# Patient Record
Sex: FEMALE | Race: WHITE | Employment: OTHER | URBAN - METROPOLITAN AREA
[De-identification: names, ages, dates, MRNs, and addresses within clinical notes are randomized per-mention and may not be internally consistent; named-entity substitution may affect disease eponyms.]

---

## 2019-01-29 ENCOUNTER — OFFICE VISIT (OUTPATIENT)
Dept: ENDOCRINOLOGY | Age: 74
End: 2019-01-29
Payer: COMMERCIAL

## 2019-01-29 VITALS
HEIGHT: 65 IN | DIASTOLIC BLOOD PRESSURE: 98 MMHG | WEIGHT: 220 LBS | RESPIRATION RATE: 20 BRPM | HEART RATE: 96 BPM | SYSTOLIC BLOOD PRESSURE: 184 MMHG | TEMPERATURE: 98.4 F | OXYGEN SATURATION: 98 % | BODY MASS INDEX: 36.65 KG/M2

## 2019-01-29 DIAGNOSIS — Z79.4 TYPE 2 DIABETES MELLITUS WITHOUT COMPLICATION, WITH LONG-TERM CURRENT USE OF INSULIN (HCC): ICD-10-CM

## 2019-01-29 DIAGNOSIS — E11.9 TYPE 2 DIABETES MELLITUS WITHOUT COMPLICATION, WITH LONG-TERM CURRENT USE OF INSULIN (HCC): ICD-10-CM

## 2019-01-29 LAB — HBA1C MFR BLD: 10.1 %

## 2019-01-29 PROCEDURE — 83036 HEMOGLOBIN GLYCOSYLATED A1C: CPT | Performed by: INTERNAL MEDICINE

## 2019-01-29 PROCEDURE — 99205 OFFICE O/P NEW HI 60 MIN: CPT | Performed by: INTERNAL MEDICINE

## 2019-01-29 RX ORDER — METOPROLOL SUCCINATE 50 MG/1
50 TABLET, EXTENDED RELEASE ORAL DAILY
COMMUNITY
End: 2019-05-09 | Stop reason: SDUPTHER

## 2019-01-29 RX ORDER — LISINOPRIL AND HYDROCHLOROTHIAZIDE 20; 12.5 MG/1; MG/1
1 TABLET ORAL 2 TIMES DAILY
COMMUNITY
End: 2019-05-09 | Stop reason: SDUPTHER

## 2019-01-29 RX ORDER — ATORVASTATIN CALCIUM 40 MG/1
40 TABLET, FILM COATED ORAL DAILY
COMMUNITY
End: 2019-05-09 | Stop reason: SDUPTHER

## 2019-02-04 LAB
AVERAGE GLUCOSE: NORMAL
HBA1C MFR BLD: 10.5 %

## 2019-03-12 ENCOUNTER — TELEPHONE (OUTPATIENT)
Dept: BARIATRICS/WEIGHT MGMT | Age: 74
End: 2019-03-12

## 2019-03-22 ENCOUNTER — TELEPHONE (OUTPATIENT)
Dept: ENDOCRINOLOGY | Age: 74
End: 2019-03-22

## 2019-04-11 ENCOUNTER — OFFICE VISIT (OUTPATIENT)
Dept: ENDOCRINOLOGY | Age: 74
End: 2019-04-11
Payer: COMMERCIAL

## 2019-04-11 VITALS
BODY MASS INDEX: 36.15 KG/M2 | SYSTOLIC BLOOD PRESSURE: 140 MMHG | RESPIRATION RATE: 16 BRPM | DIASTOLIC BLOOD PRESSURE: 76 MMHG | HEIGHT: 65 IN | WEIGHT: 217 LBS | HEART RATE: 72 BPM

## 2019-04-11 DIAGNOSIS — Z79.4 TYPE 2 DIABETES MELLITUS WITHOUT COMPLICATION, WITH LONG-TERM CURRENT USE OF INSULIN (HCC): Primary | ICD-10-CM

## 2019-04-11 DIAGNOSIS — E11.9 TYPE 2 DIABETES MELLITUS WITHOUT COMPLICATION, WITH LONG-TERM CURRENT USE OF INSULIN (HCC): Primary | ICD-10-CM

## 2019-04-11 PROCEDURE — 99214 OFFICE O/P EST MOD 30 MIN: CPT | Performed by: INTERNAL MEDICINE

## 2019-04-11 RX ORDER — NAPROXEN SODIUM 220 MG
TABLET ORAL
Qty: 300 EACH | Refills: 2 | Status: SHIPPED | OUTPATIENT
Start: 2019-04-11 | End: 2020-04-30 | Stop reason: SDUPTHER

## 2019-04-11 NOTE — PROGRESS NOTES
CC -   T2DM, Hyperglycemia    HPI -       Pt returns for follow up of last visit with me on 01/29/2019        Instructions from last visit were:    1. T2DM - uncontrolled       A1c 10.1%, without lows       Pt's limiting factor presently is her diet (it includes a lot of carbs at the present time)       Also, she is not taking her insulin 70/30 correctly and has no BG data for me to review. Therefore the plan will be: Take Insulin 70/30 30 units before breakfast and 15 units before dinner       Stop Tradjenta (it is probably not helping at this point)       Cont metformin 500 mg bid       Check BG twice daily: before breakfast every day + before dinner on even days and before bed on odd days       Also, during the next two weeks, check BG before each meal, two hours after each meal and before bedtime. Record these on the log given and send to our clinic.     2. Hypertension - uncontrolled       Pt states that her BP is usually elevated, even at home (though not as high as it is today)       Check BP at home frequently. If systolic BP is consistently above 140 mmHg or the diastolic BP above 90 mmHg, then contact Dr. Avni Okeefe for a dose adjustment     3. Follow up       See me back in two months    Clinical documentation from 3/22/19  Spoke with pt by phone regarding BG readings Mar 4-19. They are near target. AM are typically in the 160-180 range with 1/4th of the number in the low 200's. There is a good fall over night from HS to Fasting. Only two lows (58,62) and these occurred after walking. The one day of 2 hour post meal readings are great - the rise is 20-70.   Will cont with the same regimen and have her eat a 15 gram carb snack with protein before walking. (a couple of Keebler toasted PB crackers or 3-5 grapes with a Mozarella cheese stick)          Update from today's encounter:  Doing well  A1c 10.5% 2/4/19  Checking BG 2-4x/day: Fasting 140-180 with 1/4th 190-215, pre-dinner low 200's with lows   Target a1c 7.5% without lows   Her BG log does not correlate with her a1c   Will obtain a CGM 3-5d study to check glycemic control   Cont present regimen: insulin 70/30 30units before breakfast and 15 units before dinner; use Novolog as needed for BG readings >200mg/dl; metformin 1000mg daily    2.   Hyperglycemia - uncontrolled   Need to determine timing of high BG readings   Complete the 3-5d CGM study    Pablo Brown MD  Endocrinology/Obesity  4/11/19

## 2019-04-18 DIAGNOSIS — Z79.4 TYPE 2 DIABETES MELLITUS WITHOUT COMPLICATION, WITH LONG-TERM CURRENT USE OF INSULIN (HCC): Primary | ICD-10-CM

## 2019-04-18 DIAGNOSIS — E11.9 TYPE 2 DIABETES MELLITUS WITHOUT COMPLICATION, WITH LONG-TERM CURRENT USE OF INSULIN (HCC): Primary | ICD-10-CM

## 2019-05-09 ENCOUNTER — OFFICE VISIT (OUTPATIENT)
Dept: PRIMARY CARE CLINIC | Age: 74
End: 2019-05-09
Payer: COMMERCIAL

## 2019-05-09 VITALS
DIASTOLIC BLOOD PRESSURE: 78 MMHG | BODY MASS INDEX: 35.82 KG/M2 | HEIGHT: 65 IN | SYSTOLIC BLOOD PRESSURE: 136 MMHG | RESPIRATION RATE: 18 BRPM | OXYGEN SATURATION: 97 % | TEMPERATURE: 99.3 F | WEIGHT: 215 LBS | HEART RATE: 88 BPM

## 2019-05-09 DIAGNOSIS — R53.83 OTHER FATIGUE: ICD-10-CM

## 2019-05-09 DIAGNOSIS — E11.22 TYPE 2 DIABETES MELLITUS WITH STAGE 2 CHRONIC KIDNEY DISEASE, WITH LONG-TERM CURRENT USE OF INSULIN (HCC): Primary | ICD-10-CM

## 2019-05-09 DIAGNOSIS — H35.3130 BILATERAL NONEXUDATIVE AGE-RELATED MACULAR DEGENERATION, UNSPECIFIED STAGE: ICD-10-CM

## 2019-05-09 DIAGNOSIS — N18.2 TYPE 2 DIABETES MELLITUS WITH STAGE 2 CHRONIC KIDNEY DISEASE, WITH LONG-TERM CURRENT USE OF INSULIN (HCC): Primary | ICD-10-CM

## 2019-05-09 DIAGNOSIS — E55.9 VITAMIN D DEFICIENCY: ICD-10-CM

## 2019-05-09 DIAGNOSIS — E78.2 MIXED HYPERLIPIDEMIA: ICD-10-CM

## 2019-05-09 DIAGNOSIS — N85.02 ENDOMETRIAL INTRAEPITHELIAL NEOPLASIA (EIN): ICD-10-CM

## 2019-05-09 DIAGNOSIS — N18.2 STAGE 2 CHRONIC KIDNEY DISEASE: ICD-10-CM

## 2019-05-09 DIAGNOSIS — R60.0 LOCALIZED EDEMA: ICD-10-CM

## 2019-05-09 DIAGNOSIS — I10 BENIGN ESSENTIAL HYPERTENSION: ICD-10-CM

## 2019-05-09 DIAGNOSIS — Z79.4 TYPE 2 DIABETES MELLITUS WITH STAGE 2 CHRONIC KIDNEY DISEASE, WITH LONG-TERM CURRENT USE OF INSULIN (HCC): Primary | ICD-10-CM

## 2019-05-09 PROCEDURE — 99214 OFFICE O/P EST MOD 30 MIN: CPT | Performed by: INTERNAL MEDICINE

## 2019-05-09 RX ORDER — METOPROLOL SUCCINATE 50 MG/1
50 TABLET, EXTENDED RELEASE ORAL DAILY
Qty: 90 TABLET | Refills: 1 | Status: SHIPPED | OUTPATIENT
Start: 2019-05-09 | End: 2019-11-07 | Stop reason: SDUPTHER

## 2019-05-09 RX ORDER — LISINOPRIL AND HYDROCHLOROTHIAZIDE 20; 12.5 MG/1; MG/1
1 TABLET ORAL 2 TIMES DAILY
Qty: 180 TABLET | Refills: 1 | Status: SHIPPED | OUTPATIENT
Start: 2019-05-09 | End: 2019-11-07 | Stop reason: SDUPTHER

## 2019-05-09 RX ORDER — ATORVASTATIN CALCIUM 40 MG/1
40 TABLET, FILM COATED ORAL DAILY
Qty: 90 TABLET | Refills: 1 | Status: SHIPPED | OUTPATIENT
Start: 2019-05-09 | End: 2019-11-07 | Stop reason: SDUPTHER

## 2019-05-09 ASSESSMENT — ENCOUNTER SYMPTOMS
COUGH: 0
VOMITING: 0
SHORTNESS OF BREATH: 0
EYE PAIN: 0
CHEST TIGHTNESS: 0
CONSTIPATION: 0
BLOOD IN STOOL: 0
NAUSEA: 0
ABDOMINAL PAIN: 0
SORE THROAT: 0
RHINORRHEA: 0
DIARRHEA: 1

## 2019-05-09 ASSESSMENT — PATIENT HEALTH QUESTIONNAIRE - PHQ9
1. LITTLE INTEREST OR PLEASURE IN DOING THINGS: 0
2. FEELING DOWN, DEPRESSED OR HOPELESS: 0
SUM OF ALL RESPONSES TO PHQ QUESTIONS 1-9: 0
SUM OF ALL RESPONSES TO PHQ QUESTIONS 1-9: 0
SUM OF ALL RESPONSES TO PHQ9 QUESTIONS 1 & 2: 0

## 2019-05-09 NOTE — PROGRESS NOTES
[facility]) Physicians   Internal Medicine     2019  Brina Marques : 1945 Sex: female  Age:76 y.o. Chief Complaint   Patient presents with    Hypertension     3 mth follow-up    Hyperlipidemia    Diabetes    Health Maintenance     due for shingles,tdap,dexa scan,colonoscopy,hep c,mammogram        HPI:   Patient presents to office for review and management of chronic medical conditions.    - [facility] has been diagnosed with macular degeneration. [facility] has seen retinal specialist and receiving  injections. Possible Unsure if Retinopathy.    - [facility] has DM. Fllowing with endocrinology for Diabetes. [facility] monitors sugars at home. States on 70/30 insulin 30 units in am and 15 units in pm and metformin. Stopped Tradjenta. Adjusted Humalog Slide scale if over 200. [facility] blood sugars in am is 110's -140 Occasionally over 200 before supper. [facility] has had episodes of hypoglycemia since last visit. Not always compliant with diet. - [facility] has hyperlipidemia. Now on atorvastatin for high intensity statin. Trying to watch diet. No reported side effects. Frequently forgets to take at bedtime.    - [facility] has high blood pressure. [facility] does randomly check blood pressure at home. States 130/80's. On lisinopril and HCTZ and metoprolol. [facility] has some fatigue.    - [facility] right shoulder pain has improved. Health Maintenance   - immunizations:   Influenza Vaccination - (10/20/2016) , (), (2018)  Pneumonia Vaccination  Zoster/Shingles Vaccine  Tetanus Vaccination    - Screenings:   Bone Density Scan - () - normal  Pelvic/Pap Exam - (), () - gyn  Mammogram - () - gyn    Colonoscopy - declines    Couseled on Home Safety  - (2017)    Dilated Eye Exam - (2017) - Wet AMD, diabetes, cataract, (2018) - no Retinopathy, wet AMD,  (2018)      ROS:  Review of Systems   Constitutional: Negative for appetite change, chills, fever and unexpected weight change.    HENT: Negative for congestion, rhinorrhea and sore throat. Eyes: Negative for pain and visual disturbance. Respiratory: Negative for cough, chest tightness and shortness of breath. Cardiovascular: Negative for chest pain and palpitations. Gastrointestinal: Positive for diarrhea. Negative for abdominal pain, blood in stool, constipation, nausea and vomiting. Genitourinary: Negative for difficulty urinating, dysuria, frequency, pelvic pain, urgency and vaginal bleeding. Musculoskeletal: Negative for arthralgias and myalgias. Skin: Negative for rash. Neurological: Negative for dizziness, syncope, weakness, light-headedness, numbness and headaches. Hematological: Negative for adenopathy. Psychiatric/Behavioral: Negative for suicidal ideas. The patient is not nervous/anxious.           Current Outpatient Medications:     metoprolol succinate (TOPROL XL) 50 MG extended release tablet, Take 1 tablet by mouth daily, Disp: 90 tablet, Rfl: 1    lisinopril-hydrochlorothiazide (PRINZIDE;ZESTORETIC) 20-12.5 MG per tablet, Take 1 tablet by mouth 2 times daily, Disp: 180 tablet, Rfl: 1    atorvastatin (LIPITOR) 40 MG tablet, Take 1 tablet by mouth daily, Disp: 90 tablet, Rfl: 1    insulin 70-30 (NOVOLIN 70/30) (70-30) 100 UNIT per ML injection vial, 30 units before breakfast and 15 units before dinner, Disp: 2 vial, Rfl: 3    metFORMIN (GLUCOPHAGE) 500 MG tablet, Take 2 tablets by mouth daily (with breakfast), Disp: 180 tablet, Rfl: 2    blood glucose test strips (ASCENSIA AUTODISC VI;ONE TOUCH ULTRA TEST VI) strip, Test BG 2-6 times daily, Disp: 400 each, Rfl: 2    Insulin Syringe-Needle U-100 (INSULIN SYRINGE .5CC/31GX5/16\") 31G X 5/16\" 0.5 ML MISC, Use to take insulin 2-3x daily, Disp: 300 each, Rfl: 2    Multiple Vitamins-Minerals (PRESERVISION AREDS 2 PO), Take by mouth 2 times daily, Disp: , Rfl:     vitamin D (CHOLECALCIFEROL) 1000 UNIT TABS tablet, Take 1,000 Units by mouth daily, Disp: , Rfl:     Allergies Allergen Reactions    Penicillins Hives and Other (See Comments)     EDEMA EXTREMITIES       Past Medical History:   Diagnosis Date    Bilateral nonexudative age-related macular degeneration 2019    HLD (hyperlipidemia)     HTN (hypertension)     Stage 2 chronic kidney disease 2019    T2DM (type 2 diabetes mellitus) ([facility] [address]     Vitamin D deficiency 2019       Past Surgical History:   Procedure Laterality Date    HYSTERECTOMY, TOTAL ABDOMINAL         Family History   Problem Relation Age of Onset    Heart Disease Mother         late onset,  in 80's    Heart Disease Father         late onset,  in 80's   [facility] No Known Problems Son     No Known Problems Son     No Known Problems Son        Social History     Socioeconomic History    Marital status:      Spouse name: Not on file    Number of children: 3    Years of education: Not on file    Highest education level: Not on file   Occupational History    Occupation:    Social Needs    Financial resource strain: Not on file    Food insecurity:     Worry: Not on file     Inability: Not on file    Transportation needs:     Medical: Not on file     Non-medical: Not on file   Tobacco Use    Smoking status: Never Smoker    Smokeless tobacco: Never Used   Substance and Sexual Activity    Alcohol use: No    Drug use: No    Sexual activity: Never   Lifestyle    Physical activity:     Days per week: Not on file     Minutes per session: Not on file    Stress: Not on file   Relationships    Social connections:     Talks on phone: Not on file     Gets together: Not on file     Attends [employer] service: Not on file     Active member of club or organization: Not on file     Attends meetings of clubs or organizations: Not on file     Relationship status: Not on file    Intimate partner violence:     Fear of current or ex partner: Not on file     Emotionally abused: Not on file     Physically abused: Not on file that so that any sugar > 200   - on metformin   - monitor for hypoglycemia and action plan  - Last A1c was 10.5 (2/2019)  - follows with optho  - on statin  - on asa  - non compliant with diet    Stopped tradjenta     Benign essential hypertension  - continue present treatment  - monitor blood pressure at home  - watch diet   - on metoprolol  - on lisinopril-HCTZ  - stable today     Mixed hyperlipidemia  continue present treatment  - watch diet  - stop pravastatin and switch to atorvastatin 40mg - high intensity statin  - improved    Localized edema  - Stable    Vitamin D deficiency  - Continue present treatment   - -on otc supplement  - follow labs     Endometrial intraepithelial neoplasia (EIN)  -s/p surgery  - follows with gyn    Stage 2 chronic kidney disease  - Noted on labs   - possibly multifactorial (HTN, DM)  - on ACE   - may need to consider nephro referral   - monitor labs        Return in about 3 months (around 8/9/2019) for check up and review and labs.       Orders Placed This Encounter   Procedures    Comprehensive Metabolic Panel     Standing Status:   Future     Standing Expiration Date:   5/9/2020    CBC with Differential     Standing Status:   Future     Standing Expiration Date:   5/9/2020    Magnesium     Standing Status:   Future     Standing Expiration Date:   5/9/2020    Lipid, Fasting     Standing Status:   Future     Standing Expiration Date:   5/9/2020    Hemoglobin A1C     Standing Status:   Future     Standing Expiration Date:   5/8/2020    Vitamin D 25 Hydroxy     Standing Status:   Future     Standing Expiration Date:   5/9/2020    TSH without Reflex     Standing Status:   Future     Standing Expiration Date:   8/9/2019    Urinalysis     Standing Status:   Future     Standing Expiration Date:   5/9/2020   [facility]     Standing Status:   Future     Standing Expiration Date:   5/9/2020       Demi Gonzalez MD  5/9/2019  1:09 PM

## 2019-07-11 DIAGNOSIS — Z79.4 TYPE 2 DIABETES MELLITUS WITHOUT COMPLICATION, WITH LONG-TERM CURRENT USE OF INSULIN (HCC): Primary | ICD-10-CM

## 2019-07-11 DIAGNOSIS — E11.9 TYPE 2 DIABETES MELLITUS WITHOUT COMPLICATION, WITH LONG-TERM CURRENT USE OF INSULIN (HCC): Primary | ICD-10-CM

## 2019-07-16 ENCOUNTER — OFFICE VISIT (OUTPATIENT)
Dept: ENDOCRINOLOGY | Age: 74
End: 2019-07-16
Payer: COMMERCIAL

## 2019-07-16 VITALS
OXYGEN SATURATION: 98 % | WEIGHT: 214.2 LBS | HEART RATE: 78 BPM | SYSTOLIC BLOOD PRESSURE: 150 MMHG | RESPIRATION RATE: 16 BRPM | BODY MASS INDEX: 35.69 KG/M2 | HEIGHT: 65 IN | DIASTOLIC BLOOD PRESSURE: 90 MMHG

## 2019-07-16 DIAGNOSIS — E11.9 TYPE 2 DIABETES MELLITUS WITHOUT COMPLICATION, WITH LONG-TERM CURRENT USE OF INSULIN (HCC): Primary | ICD-10-CM

## 2019-07-16 DIAGNOSIS — R73.9 HYPERGLYCEMIA: ICD-10-CM

## 2019-07-16 DIAGNOSIS — Z79.4 TYPE 2 DIABETES MELLITUS WITHOUT COMPLICATION, WITH LONG-TERM CURRENT USE OF INSULIN (HCC): Primary | ICD-10-CM

## 2019-07-16 LAB — HBA1C MFR BLD: 10.4 %

## 2019-07-16 PROCEDURE — 99214 OFFICE O/P EST MOD 30 MIN: CPT | Performed by: INTERNAL MEDICINE

## 2019-07-16 PROCEDURE — 83036 HEMOGLOBIN GLYCOSYLATED A1C: CPT | Performed by: INTERNAL MEDICINE

## 2019-07-16 NOTE — PROGRESS NOTES
CC -   T2DM, Hyperglycemia    HPI -   Pt returns for follow up of last visit with me on 4/11/2019    Problem 1: T2DM  a1c 10.4% 7/16/19, was 10.5% 2/14/19  Checking Fasting and pre-dinner (mid-100's to mid-200's)  Present regimen: insulin 70/30 30units before breakfast and 15 units before dinner; uses 5units of Novolog as needed for BG readings >250mg/dl; metformin 1000mg daily    Eyes: 6/6/2019, no DR  Kidneys:  Cr 1.09 2/4/19  ACR:  Had performed 10/9/18 at [address] [place] [address]. The results were not printed correctly. Will need to have staff check  Statin:  Atorvastatin 40mg daily  Ace-I:   Lisinopril 20 mg daily  BP:      +HTN, 150/90, Typically controlled on Lisinopril, HCTZ, Metoprolol  Feet:    1/29/19, Good monofilament sensation b/l, no ulcers or calluses, +onychomycosis b/l, good perfusion of both feet    Problem 2. Hyperglycemia  Not following a carb conscious diet and this is her chief concern. It places her at risk of hypoglycemia at times when her BG is normal and she eats an appropriate amount of carbs    PFSH -  No changes, Meds reviewed, non-smoker    ROS -  Card - no CP  GI - no N/V/D    PE -  Gen : BP (!) 150/90 (Site: Left Upper Arm, Position: Sitting, Cuff Size: Medium Adult)   Pulse 78   Resp 16   Ht 5' 5\" (1.651 m)   Wt 214 lb 3.2 oz (97.2 kg)   SpO2 98%   BMI 35.64 kg/m²    WN, WD, NAD  Lung: Nml resp effort  Psych: Normal mood   Full affect    A/P -   1. T2DM - uncontrolled  See HPI for description  I still do not have the report of her U ACR  Pt's chief issue is her diet. She understands this and will cont to try to make changes  Plan:  Cont with present regimen: insulin 70/30 30units before breakfast and 15 units before dinner; Take 5 units of Novolog (per her previous plan prior to seeing me) as needed for BG readings >250mg/dl; metformin 1000mg daily  Restrict carbs to <120 grams per day (<40 grams per meal)  Check urine for microalbumin    2.   Hyperglycemia - uncontrolled  See HPI for

## 2019-08-09 ENCOUNTER — HOSPITAL ENCOUNTER (OUTPATIENT)
Age: 74
Discharge: HOME | End: 2019-08-11
Payer: COMMERCIAL

## 2019-08-09 DIAGNOSIS — N18.2 TYPE 2 DIABETES MELLITUS WITH STAGE 2 CHRONIC KIDNEY DISEASE, WITH LONG-TERM CURRENT USE OF INSULIN (HCC): ICD-10-CM

## 2019-08-09 DIAGNOSIS — E11.22 TYPE 2 DIABETES MELLITUS WITH STAGE 2 CHRONIC KIDNEY DISEASE, WITH LONG-TERM CURRENT USE OF INSULIN (HCC): ICD-10-CM

## 2019-08-09 DIAGNOSIS — E55.9 VITAMIN D DEFICIENCY: ICD-10-CM

## 2019-08-09 DIAGNOSIS — Z79.4 TYPE 2 DIABETES MELLITUS WITH STAGE 2 CHRONIC KIDNEY DISEASE, WITH LONG-TERM CURRENT USE OF INSULIN (HCC): ICD-10-CM

## 2019-08-09 DIAGNOSIS — R53.83 OTHER FATIGUE: ICD-10-CM

## 2019-08-09 DIAGNOSIS — I10 BENIGN ESSENTIAL HYPERTENSION: ICD-10-CM

## 2019-08-09 DIAGNOSIS — E78.2 MIXED HYPERLIPIDEMIA: ICD-10-CM

## 2019-08-09 LAB
ALBUMIN SERPL-MCNC: 4 G/DL (ref 3.5–5.2)
ALP BLD-CCNC: 169 U/L (ref 35–104)
ALT SERPL-CCNC: 12 U/L (ref 0–32)
ANION GAP SERPL CALCULATED.3IONS-SCNC: 15 MMOL/L (ref 7–16)
AST SERPL-CCNC: 13 U/L (ref 0–31)
BACTERIA: ABNORMAL /HPF
BASOPHILS ABSOLUTE: 0.05 E9/L (ref 0–0.2)
BASOPHILS RELATIVE PERCENT: 0.8 % (ref 0–2)
BILIRUB SERPL-MCNC: 0.3 MG/DL (ref 0–1.2)
BILIRUBIN URINE: NEGATIVE
BLOOD, URINE: NEGATIVE
BUN BLDV-MCNC: 19 MG/DL (ref 8–23)
CALCIUM SERPL-MCNC: 9.6 MG/DL (ref 8.6–10.2)
CHLORIDE BLD-SCNC: 99 MMOL/L (ref 98–107)
CHOLESTEROL, FASTING: 171 MG/DL (ref 0–199)
CLARITY: CLEAR
CO2: 25 MMOL/L (ref 22–29)
COLOR: YELLOW
CREAT SERPL-MCNC: 1.1 MG/DL (ref 0.5–1)
EOSINOPHILS ABSOLUTE: 0.29 E9/L (ref 0.05–0.5)
EOSINOPHILS RELATIVE PERCENT: 4.4 % (ref 0–6)
GFR AFRICAN AMERICAN: 59
GFR NON-AFRICAN AMERICAN: 49 ML/MIN/1.73
GLUCOSE BLD-MCNC: 255 MG/DL (ref 74–99)
GLUCOSE URINE: NEGATIVE MG/DL
HBA1C MFR BLD: 10.2 % (ref 4–5.6)
HCT VFR BLD CALC: 40.2 % (ref 34–48)
HDLC SERPL-MCNC: 55 MG/DL
HEMOGLOBIN: 13 G/DL (ref 11.5–15.5)
IMMATURE GRANULOCYTES #: 0.03 E9/L
IMMATURE GRANULOCYTES %: 0.5 % (ref 0–5)
KETONES, URINE: NEGATIVE MG/DL
LDL CHOLESTEROL CALCULATED: 92 MG/DL (ref 0–99)
LEUKOCYTE ESTERASE, URINE: ABNORMAL
LYMPHOCYTES ABSOLUTE: 1.2 E9/L (ref 1.5–4)
LYMPHOCYTES RELATIVE PERCENT: 18.2 % (ref 20–42)
MAGNESIUM: 1.7 MG/DL (ref 1.6–2.6)
MCH RBC QN AUTO: 28.3 PG (ref 26–35)
MCHC RBC AUTO-ENTMCNC: 32.3 % (ref 32–34.5)
MCV RBC AUTO: 87.4 FL (ref 80–99.9)
MICROALBUMIN UR-MCNC: 12.1 MG/L
MONOCYTES ABSOLUTE: 0.33 E9/L (ref 0.1–0.95)
MONOCYTES RELATIVE PERCENT: 5 % (ref 2–12)
NEUTROPHILS ABSOLUTE: 4.69 E9/L (ref 1.8–7.3)
NEUTROPHILS RELATIVE PERCENT: 71.1 % (ref 43–80)
NITRITE, URINE: NEGATIVE
PDW BLD-RTO: 13.8 FL (ref 11.5–15)
PH UA: 6 (ref 5–9)
PLATELET # BLD: 170 E9/L (ref 130–450)
PMV BLD AUTO: 11.1 FL (ref 7–12)
POTASSIUM SERPL-SCNC: 4.1 MMOL/L (ref 3.5–5)
PROTEIN UA: NEGATIVE MG/DL
RBC # BLD: 4.6 E12/L (ref 3.5–5.5)
RBC UA: ABNORMAL /HPF (ref 0–2)
SODIUM BLD-SCNC: 139 MMOL/L (ref 132–146)
SPECIFIC GRAVITY UA: <=1.005 (ref 1–1.03)
TOTAL PROTEIN: 7 G/DL (ref 6.4–8.3)
TRIGLYCERIDE, FASTING: 121 MG/DL (ref 0–149)
TSH SERPL DL<=0.05 MIU/L-ACNC: 2.8 UIU/ML (ref 0.27–4.2)
UROBILINOGEN, URINE: 0.2 E.U./DL
VITAMIN D 25-HYDROXY: 31 NG/ML (ref 30–100)
VLDLC SERPL CALC-MCNC: 24 MG/DL
WBC # BLD: 6.6 E9/L (ref 4.5–11.5)
WBC UA: ABNORMAL /HPF (ref 0–5)

## 2019-08-09 PROCEDURE — 80053 COMPREHEN METABOLIC PANEL: CPT

## 2019-08-09 PROCEDURE — 82306 VITAMIN D 25 HYDROXY: CPT

## 2019-08-09 PROCEDURE — 80061 LIPID PANEL: CPT

## 2019-08-09 PROCEDURE — 85025 COMPLETE CBC W/AUTO DIFF WBC: CPT

## 2019-08-09 PROCEDURE — 82044 UR ALBUMIN SEMIQUANTITATIVE: CPT

## 2019-08-09 PROCEDURE — 81001 URINALYSIS AUTO W/SCOPE: CPT

## 2019-08-09 PROCEDURE — 36415 COLL VENOUS BLD VENIPUNCTURE: CPT

## 2019-08-09 PROCEDURE — 83036 HEMOGLOBIN GLYCOSYLATED A1C: CPT

## 2019-08-09 PROCEDURE — 84443 ASSAY THYROID STIM HORMONE: CPT

## 2019-08-09 PROCEDURE — 83735 ASSAY OF MAGNESIUM: CPT

## 2019-08-10 ENCOUNTER — TELEPHONE (OUTPATIENT)
Dept: FAMILY MEDICINE CLINIC | Age: 74
End: 2019-08-10

## 2019-08-10 NOTE — TELEPHONE ENCOUNTER
Please let the patient know that lab work showed:     Labs show slight kidney dysfunction. Need to follow closely given how uncontrolled diabetes is,     Glucose was elevated. A1c for 3 month sugar control was still uncontrolled at 10.2. Continue present treatment and continue to watch diet from carb's and sugars per endocrinology    Cholesterol was good,continue to watch diet and current medication     Thyroid labs were normal.     Vitamin D was normal but low normal - ok for supplement if taking, would recommend vitamin D if not taking     Urine analysis was nonspecific. Other electrolytes, liver, and blood counts were ok. Thanks!

## 2019-08-15 ENCOUNTER — OFFICE VISIT (OUTPATIENT)
Dept: PRIMARY CARE CLINIC | Age: 74
End: 2019-08-15
Payer: COMMERCIAL

## 2019-08-15 VITALS
WEIGHT: 213 LBS | HEART RATE: 77 BPM | TEMPERATURE: 98.9 F | HEIGHT: 63 IN | BODY MASS INDEX: 37.74 KG/M2 | DIASTOLIC BLOOD PRESSURE: 80 MMHG | SYSTOLIC BLOOD PRESSURE: 140 MMHG | RESPIRATION RATE: 20 BRPM | OXYGEN SATURATION: 97 %

## 2019-08-15 DIAGNOSIS — I10 BENIGN ESSENTIAL HYPERTENSION: ICD-10-CM

## 2019-08-15 DIAGNOSIS — N85.02 ENDOMETRIAL INTRAEPITHELIAL NEOPLASIA (EIN): ICD-10-CM

## 2019-08-15 DIAGNOSIS — Z79.4 TYPE 2 DIABETES MELLITUS WITH STAGE 2 CHRONIC KIDNEY DISEASE, WITH LONG-TERM CURRENT USE OF INSULIN (HCC): ICD-10-CM

## 2019-08-15 DIAGNOSIS — E11.22 TYPE 2 DIABETES MELLITUS WITH STAGE 2 CHRONIC KIDNEY DISEASE, WITH LONG-TERM CURRENT USE OF INSULIN (HCC): ICD-10-CM

## 2019-08-15 DIAGNOSIS — E55.9 VITAMIN D DEFICIENCY: ICD-10-CM

## 2019-08-15 DIAGNOSIS — Z12.11 SCREENING FOR MALIGNANT NEOPLASM OF COLON: ICD-10-CM

## 2019-08-15 DIAGNOSIS — R60.0 LOCALIZED EDEMA: ICD-10-CM

## 2019-08-15 DIAGNOSIS — N18.2 TYPE 2 DIABETES MELLITUS WITH STAGE 2 CHRONIC KIDNEY DISEASE, WITH LONG-TERM CURRENT USE OF INSULIN (HCC): ICD-10-CM

## 2019-08-15 DIAGNOSIS — N18.2 STAGE 2 CHRONIC KIDNEY DISEASE: ICD-10-CM

## 2019-08-15 DIAGNOSIS — H35.3130 BILATERAL NONEXUDATIVE AGE-RELATED MACULAR DEGENERATION, UNSPECIFIED STAGE: ICD-10-CM

## 2019-08-15 DIAGNOSIS — E78.2 MIXED HYPERLIPIDEMIA: ICD-10-CM

## 2019-08-15 DIAGNOSIS — Z12.39 SCREENING FOR MALIGNANT NEOPLASM OF BREAST: Primary | ICD-10-CM

## 2019-08-15 PROCEDURE — 99214 OFFICE O/P EST MOD 30 MIN: CPT | Performed by: INTERNAL MEDICINE

## 2019-08-15 RX ORDER — HYDRALAZINE HYDROCHLORIDE 10 MG/1
10 TABLET, FILM COATED ORAL 2 TIMES DAILY
Qty: 60 TABLET | Refills: 2 | Status: SHIPPED | OUTPATIENT
Start: 2019-08-15 | End: 2019-11-07 | Stop reason: SDUPTHER

## 2019-08-15 ASSESSMENT — ENCOUNTER SYMPTOMS
ABDOMINAL PAIN: 0
VOMITING: 0
NAUSEA: 0
EYE PAIN: 0
SORE THROAT: 0
CONSTIPATION: 0
CHEST TIGHTNESS: 0
RHINORRHEA: 0
COUGH: 0
DIARRHEA: 1
BLOOD IN STOOL: 0
SHORTNESS OF BREATH: 0

## 2019-08-15 NOTE — PROGRESS NOTES
2 chronic kidney disease, with long-term current use of insulin ([facility] [address]  - continue present treatment  - following with endocrinology   - monitor sugar at home  - now on 70/30 insulin 30 units in am and 15 units in pm  - Humalog with meals and slide scale add to that so that any sugar > 200   - on metformin   - monitor for hypoglycemia and action plan  - Last A1c was 10.2 (8/2019)  - follows with optho  - on statin  - on asa  - non compliant with diet  - established with endocrinology     Stopped tradjenta     Benign essential hypertension  - continue present treatment  - monitor blood pressure at home  - watch diet   - on metoprolol  - on lisinopril-HCTZ  - declines amlodipine   - add hydralazine 10mg twice a day   - suggested nephrology - declines   - elevated today     Mixed hyperlipidemia  - continue present treatment  - watch diet  - on atorvastatin 40mg - high intensity statin  - improved    Localized edema  - Stable    Vitamin D deficiency  - Continue present treatment   - -on otc supplement  - follow labs     Endometrial intraepithelial neoplasia (EIN)  -s/p surgery  - follows with gyn    Stage 2 chronic kidney disease  - Noted on labs   - possibly multifactorial (HTN, DM)  - on ACE   - may need to consider nephro referral   - monitor labs - stable      Return in about 3 months (around 11/15/2019) for check up and review.       Orders Placed This Encounter   Procedures    CHARLES DIGITAL SCREEN W CAD BILATERAL     Standing Status:   Future     Standing Expiration Date:   10/14/2020     Order Specific Question:   Reason for exam:     Answer:   screening for breast cancer    POCT Fecal Immunochemical Test (FIT)     Standing Status:   Future     Standing Expiration Date:   8/15/2020       Dylon Chadwick MD  8/15/2019  11:16 AM

## 2019-08-29 DIAGNOSIS — Z12.11 SCREENING FOR MALIGNANT NEOPLASM OF COLON: ICD-10-CM

## 2019-08-29 LAB
CONTROL: NORMAL
HEMOCCULT STL QL: NEGATIVE

## 2019-08-29 PROCEDURE — 82274 ASSAY TEST FOR BLOOD FECAL: CPT | Performed by: INTERNAL MEDICINE

## 2019-09-12 ENCOUNTER — TELEPHONE (OUTPATIENT)
Dept: PRIMARY CARE CLINIC | Age: 74
End: 2019-09-12

## 2019-10-22 ENCOUNTER — TELEPHONE (OUTPATIENT)
Dept: PRIMARY CARE CLINIC | Age: 74
End: 2019-10-22

## 2019-10-30 ENCOUNTER — TELEPHONE (OUTPATIENT)
Dept: PRIMARY CARE CLINIC | Age: 74
End: 2019-10-30

## 2019-11-01 DIAGNOSIS — Z79.4 TYPE 2 DIABETES MELLITUS WITHOUT COMPLICATION, WITH LONG-TERM CURRENT USE OF INSULIN (HCC): ICD-10-CM

## 2019-11-01 DIAGNOSIS — E11.9 TYPE 2 DIABETES MELLITUS WITHOUT COMPLICATION, WITH LONG-TERM CURRENT USE OF INSULIN (HCC): ICD-10-CM

## 2019-11-07 ENCOUNTER — OFFICE VISIT (OUTPATIENT)
Dept: PRIMARY CARE CLINIC | Age: 74
End: 2019-11-07
Payer: COMMERCIAL

## 2019-11-07 ENCOUNTER — TELEPHONE (OUTPATIENT)
Dept: PRIMARY CARE CLINIC | Age: 74
End: 2019-11-07

## 2019-11-07 VITALS
BODY MASS INDEX: 37.63 KG/M2 | DIASTOLIC BLOOD PRESSURE: 80 MMHG | HEART RATE: 82 BPM | RESPIRATION RATE: 18 BRPM | SYSTOLIC BLOOD PRESSURE: 140 MMHG | WEIGHT: 212.4 LBS | OXYGEN SATURATION: 96 % | TEMPERATURE: 98.2 F | HEIGHT: 63 IN

## 2019-11-07 DIAGNOSIS — N18.2 TYPE 2 DIABETES MELLITUS WITH STAGE 2 CHRONIC KIDNEY DISEASE, WITH LONG-TERM CURRENT USE OF INSULIN (HCC): ICD-10-CM

## 2019-11-07 DIAGNOSIS — R60.0 LOCALIZED EDEMA: ICD-10-CM

## 2019-11-07 DIAGNOSIS — N85.02 ENDOMETRIAL INTRAEPITHELIAL NEOPLASIA (EIN): ICD-10-CM

## 2019-11-07 DIAGNOSIS — E55.9 VITAMIN D DEFICIENCY: ICD-10-CM

## 2019-11-07 DIAGNOSIS — H35.3130 BILATERAL NONEXUDATIVE AGE-RELATED MACULAR DEGENERATION, UNSPECIFIED STAGE: Primary | ICD-10-CM

## 2019-11-07 DIAGNOSIS — E11.22 TYPE 2 DIABETES MELLITUS WITH STAGE 2 CHRONIC KIDNEY DISEASE, WITH LONG-TERM CURRENT USE OF INSULIN (HCC): ICD-10-CM

## 2019-11-07 DIAGNOSIS — N18.2 STAGE 2 CHRONIC KIDNEY DISEASE: ICD-10-CM

## 2019-11-07 DIAGNOSIS — R53.83 OTHER FATIGUE: ICD-10-CM

## 2019-11-07 DIAGNOSIS — E78.2 MIXED HYPERLIPIDEMIA: ICD-10-CM

## 2019-11-07 DIAGNOSIS — Z79.4 TYPE 2 DIABETES MELLITUS WITH STAGE 2 CHRONIC KIDNEY DISEASE, WITH LONG-TERM CURRENT USE OF INSULIN (HCC): ICD-10-CM

## 2019-11-07 DIAGNOSIS — I10 BENIGN ESSENTIAL HYPERTENSION: ICD-10-CM

## 2019-11-07 PROCEDURE — 99214 OFFICE O/P EST MOD 30 MIN: CPT | Performed by: INTERNAL MEDICINE

## 2019-11-07 RX ORDER — METOPROLOL SUCCINATE 50 MG/1
50 TABLET, EXTENDED RELEASE ORAL DAILY
Qty: 90 TABLET | Refills: 1 | Status: SHIPPED | OUTPATIENT
Start: 2019-11-07 | End: 2020-02-06 | Stop reason: SDUPTHER

## 2019-11-07 RX ORDER — LISINOPRIL AND HYDROCHLOROTHIAZIDE 20; 12.5 MG/1; MG/1
1 TABLET ORAL 2 TIMES DAILY
Qty: 180 TABLET | Refills: 1 | Status: SHIPPED | OUTPATIENT
Start: 2019-11-07 | End: 2020-02-06 | Stop reason: SDUPTHER

## 2019-11-07 RX ORDER — ATORVASTATIN CALCIUM 40 MG/1
40 TABLET, FILM COATED ORAL DAILY
Qty: 90 TABLET | Refills: 1 | Status: SHIPPED | OUTPATIENT
Start: 2019-11-07 | End: 2020-02-06 | Stop reason: SDUPTHER

## 2019-11-07 RX ORDER — HYDRALAZINE HYDROCHLORIDE 10 MG/1
10 TABLET, FILM COATED ORAL 2 TIMES DAILY
Qty: 60 TABLET | Refills: 2 | Status: SHIPPED | OUTPATIENT
Start: 2019-11-07 | End: 2020-02-06 | Stop reason: SDUPTHER

## 2019-11-07 ASSESSMENT — ENCOUNTER SYMPTOMS
CONSTIPATION: 0
BLOOD IN STOOL: 0
CHEST TIGHTNESS: 0
COUGH: 0
VOMITING: 0
ABDOMINAL PAIN: 0
DIARRHEA: 1
EYE PAIN: 0
SHORTNESS OF BREATH: 0
SORE THROAT: 0
RHINORRHEA: 0
NAUSEA: 0

## 2019-11-13 ENCOUNTER — OFFICE VISIT (OUTPATIENT)
Dept: ENDOCRINOLOGY | Age: 74
End: 2019-11-13
Payer: COMMERCIAL

## 2019-11-13 VITALS
BODY MASS INDEX: 37.95 KG/M2 | RESPIRATION RATE: 16 BRPM | HEIGHT: 63 IN | WEIGHT: 214.2 LBS | OXYGEN SATURATION: 96 % | HEART RATE: 84 BPM | DIASTOLIC BLOOD PRESSURE: 80 MMHG | SYSTOLIC BLOOD PRESSURE: 138 MMHG

## 2019-11-13 DIAGNOSIS — Z79.4 TYPE 2 DIABETES MELLITUS WITHOUT COMPLICATION, WITH LONG-TERM CURRENT USE OF INSULIN (HCC): Primary | ICD-10-CM

## 2019-11-13 DIAGNOSIS — E11.9 TYPE 2 DIABETES MELLITUS WITHOUT COMPLICATION, WITH LONG-TERM CURRENT USE OF INSULIN (HCC): Primary | ICD-10-CM

## 2019-11-13 LAB — HBA1C MFR BLD: 10.6 %

## 2019-11-13 PROCEDURE — 83036 HEMOGLOBIN GLYCOSYLATED A1C: CPT | Performed by: INTERNAL MEDICINE

## 2019-11-13 PROCEDURE — 99214 OFFICE O/P EST MOD 30 MIN: CPT | Performed by: INTERNAL MEDICINE

## 2019-12-02 ENCOUNTER — TELEPHONE (OUTPATIENT)
Dept: PRIMARY CARE CLINIC | Age: 74
End: 2019-12-02

## 2019-12-12 DIAGNOSIS — Z12.39 SCREENING FOR MALIGNANT NEOPLASM OF BREAST: ICD-10-CM

## 2020-01-31 ENCOUNTER — HOSPITAL ENCOUNTER (OUTPATIENT)
Age: 75
Discharge: HOME | End: 2020-02-02
Payer: MEDICARE

## 2020-01-31 LAB
ALBUMIN SERPL-MCNC: 4 G/DL (ref 3.5–5.2)
ALP BLD-CCNC: 152 U/L (ref 35–104)
ALT SERPL-CCNC: 18 U/L (ref 0–32)
ANION GAP SERPL CALCULATED.3IONS-SCNC: 20 MMOL/L (ref 7–16)
AST SERPL-CCNC: 21 U/L (ref 0–31)
BACTERIA: ABNORMAL /HPF
BASOPHILS ABSOLUTE: 0.05 E9/L (ref 0–0.2)
BASOPHILS RELATIVE PERCENT: 0.7 % (ref 0–2)
BILIRUB SERPL-MCNC: 0.5 MG/DL (ref 0–1.2)
BILIRUBIN URINE: NEGATIVE
BLOOD, URINE: NEGATIVE
BUN BLDV-MCNC: 29 MG/DL (ref 8–23)
CALCIUM SERPL-MCNC: 9.6 MG/DL (ref 8.6–10.2)
CHLORIDE BLD-SCNC: 100 MMOL/L (ref 98–107)
CHOLESTEROL, FASTING: 171 MG/DL (ref 0–199)
CLARITY: CLEAR
CO2: 22 MMOL/L (ref 22–29)
COLOR: YELLOW
CREAT SERPL-MCNC: 1.2 MG/DL (ref 0.5–1)
EOSINOPHILS ABSOLUTE: 0.41 E9/L (ref 0.05–0.5)
EOSINOPHILS RELATIVE PERCENT: 5.8 % (ref 0–6)
GFR AFRICAN AMERICAN: 53
GFR NON-AFRICAN AMERICAN: 44 ML/MIN/1.73
GLUCOSE BLD-MCNC: 252 MG/DL (ref 74–99)
GLUCOSE URINE: NEGATIVE MG/DL
HBA1C MFR BLD: 10.4 % (ref 4–5.6)
HCT VFR BLD CALC: 42.7 % (ref 34–48)
HDLC SERPL-MCNC: 55 MG/DL
HEMOGLOBIN: 13 G/DL (ref 11.5–15.5)
IMMATURE GRANULOCYTES #: 0.04 E9/L
IMMATURE GRANULOCYTES %: 0.6 % (ref 0–5)
KETONES, URINE: NEGATIVE MG/DL
LDL CHOLESTEROL CALCULATED: 95 MG/DL (ref 0–99)
LEUKOCYTE ESTERASE, URINE: ABNORMAL
LYMPHOCYTES ABSOLUTE: 1.33 E9/L (ref 1.5–4)
LYMPHOCYTES RELATIVE PERCENT: 18.9 % (ref 20–42)
MAGNESIUM: 1.6 MG/DL (ref 1.6–2.6)
MCH RBC QN AUTO: 27.1 PG (ref 26–35)
MCHC RBC AUTO-ENTMCNC: 30.4 % (ref 32–34.5)
MCV RBC AUTO: 89.1 FL (ref 80–99.9)
MICROALBUMIN UR-MCNC: 21.7 MG/L
MONOCYTES ABSOLUTE: 0.39 E9/L (ref 0.1–0.95)
MONOCYTES RELATIVE PERCENT: 5.5 % (ref 2–12)
NEUTROPHILS ABSOLUTE: 4.83 E9/L (ref 1.8–7.3)
NEUTROPHILS RELATIVE PERCENT: 68.5 % (ref 43–80)
NITRITE, URINE: NEGATIVE
PDW BLD-RTO: 13.8 FL (ref 11.5–15)
PH UA: 6 (ref 5–9)
PLATELET # BLD: 195 E9/L (ref 130–450)
PMV BLD AUTO: 11.2 FL (ref 7–12)
POTASSIUM SERPL-SCNC: 3.8 MMOL/L (ref 3.5–5)
PROTEIN UA: NEGATIVE MG/DL
RBC # BLD: 4.79 E12/L (ref 3.5–5.5)
RBC UA: ABNORMAL /HPF (ref 0–2)
SODIUM BLD-SCNC: 142 MMOL/L (ref 132–146)
SPECIFIC GRAVITY UA: 1.02 (ref 1–1.03)
TOTAL PROTEIN: 7.3 G/DL (ref 6.4–8.3)
TRIGLYCERIDE, FASTING: 107 MG/DL (ref 0–149)
TSH SERPL DL<=0.05 MIU/L-ACNC: 3.48 UIU/ML (ref 0.27–4.2)
UROBILINOGEN, URINE: 0.2 E.U./DL
VITAMIN D 25-HYDROXY: 32 NG/ML (ref 30–100)
VLDLC SERPL CALC-MCNC: 21 MG/DL
WBC # BLD: 7.1 E9/L (ref 4.5–11.5)
WBC UA: ABNORMAL /HPF (ref 0–5)

## 2020-01-31 PROCEDURE — 80053 COMPREHEN METABOLIC PANEL: CPT

## 2020-01-31 PROCEDURE — 82306 VITAMIN D 25 HYDROXY: CPT

## 2020-01-31 PROCEDURE — 85025 COMPLETE CBC W/AUTO DIFF WBC: CPT

## 2020-01-31 PROCEDURE — 36415 COLL VENOUS BLD VENIPUNCTURE: CPT

## 2020-01-31 PROCEDURE — 81001 URINALYSIS AUTO W/SCOPE: CPT

## 2020-01-31 PROCEDURE — 82044 UR ALBUMIN SEMIQUANTITATIVE: CPT

## 2020-01-31 PROCEDURE — 83036 HEMOGLOBIN GLYCOSYLATED A1C: CPT

## 2020-01-31 PROCEDURE — 84443 ASSAY THYROID STIM HORMONE: CPT

## 2020-01-31 PROCEDURE — 80061 LIPID PANEL: CPT

## 2020-01-31 PROCEDURE — 83735 ASSAY OF MAGNESIUM: CPT

## 2020-02-01 ENCOUNTER — TELEPHONE (OUTPATIENT)
Dept: FAMILY MEDICINE CLINIC | Age: 75
End: 2020-02-01

## 2020-02-01 NOTE — TELEPHONE ENCOUNTER
Please let the patient know that blood work results showed    Sugar was elevated, A1c for 3-month sugar control assessment was still uncontrolled at 10.4. Recommend to continue to watch diet. And will obviously need to discuss with endocrinology and reevaluate medications    Labs still show kidney dysfunction slight change, from previous lab results    Urine analysis showed slight protein and white cells but otherwise was normal    Cholesterol levels were fair continue to watch diet and continue current medications.      Alkaline phosphatase levels were slightly elevated, nonspecific finding    Other liver function tests and electrolytes were normal    Vitamin D was normal but low normal    Blood counts were normal    Thanks

## 2020-02-06 ENCOUNTER — OFFICE VISIT (OUTPATIENT)
Dept: PRIMARY CARE CLINIC | Age: 75
End: 2020-02-06
Payer: MEDICARE

## 2020-02-06 VITALS
SYSTOLIC BLOOD PRESSURE: 134 MMHG | TEMPERATURE: 98.8 F | HEIGHT: 63 IN | OXYGEN SATURATION: 98 % | HEART RATE: 88 BPM | DIASTOLIC BLOOD PRESSURE: 88 MMHG | WEIGHT: 215.8 LBS | RESPIRATION RATE: 18 BRPM | BODY MASS INDEX: 38.24 KG/M2

## 2020-02-06 PROCEDURE — 99214 OFFICE O/P EST MOD 30 MIN: CPT | Performed by: INTERNAL MEDICINE

## 2020-02-06 RX ORDER — LISINOPRIL AND HYDROCHLOROTHIAZIDE 20; 12.5 MG/1; MG/1
1 TABLET ORAL 2 TIMES DAILY
Qty: 180 TABLET | Refills: 1 | Status: SHIPPED | OUTPATIENT
Start: 2020-02-06 | End: 2020-11-18 | Stop reason: SDUPTHER

## 2020-02-06 RX ORDER — HYDRALAZINE HYDROCHLORIDE 10 MG/1
10 TABLET, FILM COATED ORAL 2 TIMES DAILY
Qty: 60 TABLET | Refills: 2 | Status: SHIPPED | OUTPATIENT
Start: 2020-02-06 | End: 2020-11-03 | Stop reason: SDUPTHER

## 2020-02-06 RX ORDER — METOPROLOL SUCCINATE 50 MG/1
50 TABLET, EXTENDED RELEASE ORAL DAILY
Qty: 90 TABLET | Refills: 1 | Status: SHIPPED | OUTPATIENT
Start: 2020-02-06 | End: 2020-11-18 | Stop reason: SDUPTHER

## 2020-02-06 RX ORDER — ATORVASTATIN CALCIUM 40 MG/1
40 TABLET, FILM COATED ORAL DAILY
Qty: 90 TABLET | Refills: 2 | Status: SHIPPED | OUTPATIENT
Start: 2020-02-06 | End: 2021-03-31 | Stop reason: SDUPTHER

## 2020-02-06 ASSESSMENT — ENCOUNTER SYMPTOMS
DIARRHEA: 1
BLOOD IN STOOL: 0
ABDOMINAL PAIN: 0
RHINORRHEA: 0
COUGH: 0
SORE THROAT: 0
CONSTIPATION: 0
CHEST TIGHTNESS: 0
NAUSEA: 0
SHORTNESS OF BREATH: 0
EYE PAIN: 0
VOMITING: 0

## 2020-02-06 ASSESSMENT — PATIENT HEALTH QUESTIONNAIRE - PHQ9
SUM OF ALL RESPONSES TO PHQ QUESTIONS 1-9: 0
2. FEELING DOWN, DEPRESSED OR HOPELESS: 0
1. LITTLE INTEREST OR PLEASURE IN DOING THINGS: 0
SUM OF ALL RESPONSES TO PHQ9 QUESTIONS 1 & 2: 0
SUM OF ALL RESPONSES TO PHQ QUESTIONS 1-9: 0

## 2020-02-06 NOTE — PROGRESS NOTES
Systems   Constitutional: Negative for appetite change, chills, fever and unexpected weight change. HENT: Negative for congestion, rhinorrhea and sore throat. Eyes: Negative for pain and visual disturbance. Respiratory: Negative for cough, chest tightness and shortness of breath. Cardiovascular: Negative for chest pain and palpitations. Gastrointestinal: Positive for diarrhea. Negative for abdominal pain, blood in stool, constipation, nausea and vomiting. Genitourinary: Negative for difficulty urinating, dysuria, frequency, pelvic pain, urgency and vaginal bleeding. Musculoskeletal: Negative for arthralgias and myalgias. Skin: Negative for rash. Neurological: Negative for dizziness, syncope, weakness, light-headedness, numbness and headaches. Hematological: Negative for adenopathy. Psychiatric/Behavioral: Negative for suicidal ideas. The patient is not nervous/anxious.           Current Outpatient Medications:     atorvastatin (LIPITOR) 40 MG tablet, Take 1 tablet by mouth daily, Disp: 90 tablet, Rfl: 2    metoprolol succinate (TOPROL XL) 50 MG extended release tablet, Take 1 tablet by mouth daily, Disp: 90 tablet, Rfl: 1    lisinopril-hydrochlorothiazide (PRINZIDE;ZESTORETIC) 20-12.5 MG per tablet, Take 1 tablet by mouth 2 times daily, Disp: 180 tablet, Rfl: 1    hydrALAZINE (APRESOLINE) 10 MG tablet, Take 1 tablet by mouth 2 times daily, Disp: 60 tablet, Rfl: 2    metFORMIN (GLUCOPHAGE) 500 MG tablet, Take 2 tablets by mouth daily (with breakfast), Disp: 180 tablet, Rfl: 2    insulin aspart (NOVOLOG) 100 UNIT/ML injection vial, Take for correction of B unit for every 30 mg/dl the BG is >140 mg/dl (max of 15 units/day), Disp: 1 vial, Rfl: 3    insulin 70-30 (NOVOLIN 70/30) (70-30) 100 UNIT per ML injection vial, 30 units before breakfast and 15 units before dinner (Patient taking differently: 30 units before breakfast and 18 units before dinner), Disp: 2 vial, Rfl: 3    blood glucose test strips (ONETOUCH VERIO) strip, 1 each by In Vitro route 4 times daily As needed. , Disp: 150 each, Rfl: 5    Insulin Syringe-Needle U-100 (INSULIN SYRINGE .5CC/31G\") 31G X \" 0.5 ML MISC, Use to take insulin 2-3x daily, Disp: 300 each, Rfl: 2    Multiple Vitamins-Minerals (PRESERVISION AREDS 2 PO), Take by mouth 2 times daily, Disp: , Rfl:     vitamin D (CHOLECALCIFEROL) 1000 UNIT TABS tablet, Take 1,000 Units by mouth daily, Disp: , Rfl:     Allergies   Allergen Reactions    Penicillins Hives and Other (See Comments)     EDEMA EXTREMITIES       Past Medical History:   Diagnosis Date    Bilateral nonexudative age-related macular degeneration 2019    HLD (hyperlipidemia)     HTN (hypertension)     Stage 2 chronic kidney disease 2019    [facility] mellitus) ([facility] [address]     Uterine cancer ([facility] [address]     s/p surgery    Vitamin D deficiency 2019       Past Surgical History:   Procedure Laterality Date    HYSTERECTOMY, TOTAL ABDOMINAL      TONSILLECTOMY         Family History   Problem Relation Age of Onset    Heart Disease Mother         late onset,  in 80's   [facility] Coronary Art Dis Mother     Heart Disease Father         late onset,  in 80's    Dementia Father     Coronary Art Dis Father     No Known Problems Son     No Known Problems Son     No Known Problems Son        Social History     Socioeconomic History    Marital status:      Spouse name: Not on file    Number of children: 3    Years of education: Not on file    Highest education level: Not on file   Occupational History    Occupation:    Social Needs    Financial resource strain: Not on file    Food insecurity:     Worry: Not on file     Inability: Not on file    Transportation needs:     Medical: Not on file     Non-medical: Not on file   Tobacco Use    Smoking status: Never Smoker    Smokeless tobacco: Never Used   Substance and Sexual Activity    Alcohol use:  No placed in this encounter.     Requested Prescriptions     Signed Prescriptions Disp Refills    atorvastatin (LIPITOR) 40 MG tablet 90 tablet 2     Sig: Take 1 tablet by mouth daily    metoprolol succinate (TOPROL XL) 50 MG extended release tablet 90 tablet 1     Sig: Take 1 tablet by mouth daily    lisinopril-hydrochlorothiazide (PRINZIDE;ZESTORETIC) 20-12.5 MG per tablet 180 tablet 1     Sig: Take 1 tablet by mouth 2 times daily    hydrALAZINE (APRESOLINE) 10 MG tablet 60 tablet 2     Sig: Take 1 tablet by mouth 2 times daily       Viktoriya Jackson MD  2/6/2020  12:41 PM

## 2020-04-15 NOTE — TELEPHONE ENCOUNTER
Ms Kim Del Angel requested a refill on her 70/30 insulin to go to  in [place], 3 month supply.  Script pending

## 2020-05-02 RX ORDER — NAPROXEN SODIUM 220 MG
TABLET ORAL
Qty: 300 EACH | Refills: 3 | Status: SHIPPED
Start: 2020-05-02 | End: 2021-04-05 | Stop reason: SDUPTHER

## 2020-06-03 RX ORDER — BLOOD SUGAR DIAGNOSTIC
STRIP MISCELLANEOUS
Qty: 200 EACH | Refills: 5 | Status: SHIPPED
Start: 2020-06-03 | End: 2020-12-17 | Stop reason: SDUPTHER

## 2020-06-09 ENCOUNTER — VIRTUAL VISIT (OUTPATIENT)
Dept: ENDOCRINOLOGY | Age: 75
End: 2020-06-09
Payer: MEDICARE

## 2020-06-09 PROCEDURE — 99214 OFFICE O/P EST MOD 30 MIN: CPT | Performed by: INTERNAL MEDICINE

## 2020-06-09 NOTE — PATIENT INSTRUCTIONS
Check BG Fasting and pre-dinner every day, 2-3 times each week also check before bedtime  Cont insulin regimen: insulin 70/30 30units before breakfast and 18 units before dinner  For correcting BG, take Novolog per the following scale: 1 unit for every 30 mg/dl the BG is >140 mg/dl   Increas metformin to 1000mg with breakfast and 1000 mg with dinner  Restrict carbs to <120 grams per day (<40 grams per meal)  15 min prior to heavy physical activity, eat 1 sheet of Inocencio cracker + 1 slice of American cheese or eat 3 Keebler type cheese crackers  Have the ordered blood and urine tests performed once threat of Covid has decreased to an acceptable level    See me back in three months

## 2020-08-26 LAB — DIABETIC RETINOPATHY: NEGATIVE

## 2020-09-04 ENCOUNTER — HOSPITAL ENCOUNTER (OUTPATIENT)
Age: 75
Discharge: HOME | End: 2020-09-06
Payer: MEDICARE

## 2020-09-04 LAB
ALBUMIN SERPL-MCNC: 3.9 G/DL (ref 3.5–5.2)
ALP BLD-CCNC: 150 U/L (ref 35–104)
ALT SERPL-CCNC: 15 U/L (ref 0–32)
ANION GAP SERPL CALCULATED.3IONS-SCNC: 16 MMOL/L (ref 7–16)
AST SERPL-CCNC: 17 U/L (ref 0–31)
BILIRUB SERPL-MCNC: 0.2 MG/DL (ref 0–1.2)
BUN BLDV-MCNC: 30 MG/DL (ref 8–23)
CALCIUM SERPL-MCNC: 9.7 MG/DL (ref 8.6–10.2)
CHLORIDE BLD-SCNC: 99 MMOL/L (ref 98–107)
CO2: 25 MMOL/L (ref 22–29)
CREAT SERPL-MCNC: 1.2 MG/DL (ref 0.5–1)
CREATININE URINE: 48 MG/DL (ref 29–226)
GFR AFRICAN AMERICAN: 53
GFR NON-AFRICAN AMERICAN: 44 ML/MIN/1.73
GLUCOSE BLD-MCNC: 234 MG/DL (ref 74–99)
HBA1C MFR BLD: 9.3 % (ref 4–5.6)
MICROALBUMIN UR-MCNC: 14.2 MG/L
MICROALBUMIN/CREAT UR-RTO: 29.6 (ref 0–30)
POTASSIUM SERPL-SCNC: 4 MMOL/L (ref 3.5–5)
SODIUM BLD-SCNC: 140 MMOL/L (ref 132–146)
TOTAL PROTEIN: 7.2 G/DL (ref 6.4–8.3)

## 2020-09-04 PROCEDURE — 36415 COLL VENOUS BLD VENIPUNCTURE: CPT

## 2020-09-04 PROCEDURE — 83036 HEMOGLOBIN GLYCOSYLATED A1C: CPT

## 2020-09-04 PROCEDURE — 82044 UR ALBUMIN SEMIQUANTITATIVE: CPT

## 2020-09-04 PROCEDURE — 82570 ASSAY OF URINE CREATININE: CPT

## 2020-09-04 PROCEDURE — 80053 COMPREHEN METABOLIC PANEL: CPT

## 2020-09-05 NOTE — RESULT ENCOUNTER NOTE
Please check with the lab about what the renin and aldosterone were cancelled. It says they were not needed.

## 2020-09-08 ENCOUNTER — OFFICE VISIT (OUTPATIENT)
Dept: ENDOCRINOLOGY | Age: 75
End: 2020-09-08
Payer: MEDICARE

## 2020-09-08 VITALS — HEIGHT: 63 IN | WEIGHT: 210.4 LBS | BODY MASS INDEX: 37.28 KG/M2

## 2020-09-08 PROBLEM — E66.01 MORBIDLY OBESE (HCC): Status: ACTIVE | Noted: 2020-09-08

## 2020-09-08 PROCEDURE — 99215 OFFICE O/P EST HI 40 MIN: CPT | Performed by: INTERNAL MEDICINE

## 2020-09-08 NOTE — PATIENT INSTRUCTIONS
Check BG Fasting and pre-dinner every day, 2-3 times each week also check before bedtime  Change the insulin regimen to: insulin 70/30 30units before breakfast and 20 units before dinner  For correcting BG, take Novolog per the following scale: 1 unit for every 30 mg/dl the BG is >140 mg/dl   Decrease  metformin 500 mg to one tablet with breakfast and one tablet with dinner  Restrict carbs to <120 grams per day (<40 grams per meal)  15 min prior to heavy physical activity, eat 1 sheet of Inocencio cracker + 1 slice of American cheese or eat 3 Keebler-type cheese crackers  Eat 1/2 sheet of a Inocencio cracker + 1/2 slice of cheese at bed time each night.   [place] with taking the insulin 15-30 min before a meal (check BG every 15 min prior to the meal when doing this)    Check blood for plasma renin activity and aldosterone    See me back in 9 months (contact me sooner if you develop a problem)  Please let me know if your BG is staying elevated - [phone]

## 2020-09-08 NOTE — PROGRESS NOTES
hyperaldosteronism   For some reason, the PRA and myron ordered by me were cancelled by the lab  Plan:  Check PRA and myron with next blood work    3. Follow up  Return to see me in 7 months (does not want an appt during the winter months; will be seeing Dr. Renetta Robledo in between)    Start time: 1:15 pm  End time:  1:55 pm    I spent 40 minutes in a face to face encounter with Haile Freitas today. >30 minutes of this was in education and counseling regarding diabetes management, insulin therapy, metformin use and prevention of exertional hypoglycemia and nocturnal hypoglycemia    An  electronic signature was used to authenticate this note.     --Cassandra Coats MD on 9/8/2020 at 1:13 PM

## 2020-11-03 RX ORDER — HYDRALAZINE HYDROCHLORIDE 10 MG/1
10 TABLET, FILM COATED ORAL 2 TIMES DAILY
Qty: 60 TABLET | Refills: 2 | Status: SHIPPED
Start: 2020-11-03 | End: 2021-04-02 | Stop reason: SDUPTHER

## 2020-11-03 NOTE — TELEPHONE ENCOUNTER
Last Appointment:  2/6/2020  Future Appointments   Date Time Provider Corey Gray   4/8/2021  1:00 PM Lyndal Schlatter, MD [facility]      Patient asking for reifll on hydralazine to [facility]

## 2020-11-18 ENCOUNTER — TELEPHONE (OUTPATIENT)
Dept: PHARMACY | Facility: CLINIC | Age: 75
End: 2020-11-18

## 2020-11-18 NOTE — TELEPHONE ENCOUNTER
Pt was last seen in Feb. We cancelled her May appt. She does not feel comfortable coming in now. Her next appt is scheduled for the end of March. Can you place lab orders for her to do before the appt? She would also like us to mail her prescriptions.

## 2020-11-18 NOTE — TELEPHONE ENCOUNTER
CLINICAL PHARMACY: ADHERENCE REVIEW  Identified care gap per [employer]; fills at [facility]: ACE/ARB and Statin adherence    Last Office Visit: 2/6/2020    ASSESSMENT  ACE/ARB ADHERENCE  (YTD Jasper Sherrell = 94%)     Per Insurance Records   Lisinopril/HCTZ last filled on 9/28/2020 for a 90 day supply. Due to refill on 12/27/2020. Patient is adherent. BP Readings from Last 3 Encounters:   02/06/20 134/88   11/13/19 138/80   11/07/19 (!) 140/80     Estimated Creatinine Clearance: 45 mL/min (A) (based on SCr of 1.2 mg/dL (H)). DIABETES ADHERENCE  PDC: n/a; prescribed insulin    Lab Results   Component Value Date    LABA1C 9.3 (H) 09/04/2020    LABA1C 10.4 (H) 01/31/2020    LABA1C 10.6 11/13/2019       STATIN ADHERENCE  (YTD PDC = 91%) Potential Fail Date: 11/25/2020; Needs 36 days to be adherent per 10/17/2020 claims data. Per [employer] Records   Atorvastatin last filled on 7/9/2020 for a 90 day supply. Per Outcomes Records:   Atorvastatin last filled on 10/26/2020 for 90 day supply. Per [facility]:   Atorvastatin last picked up on 10/26/2020 for 90 day supply. 1 refill remaining. Billed through [employer]. Lab Results   Component Value Date    HDL 55 01/31/2020    [address] 95 01/31/2020     ALT   Date Value Ref Range Status   09/04/2020 15 0 - 32 U/L Final     AST   Date Value Ref Range Status   09/04/2020 17 0 - 31 U/L Final     The 10-year ASCVD risk score (Sherry Perdomo, et al., 2013) is: 35.5%    Values used to calculate the score:      Age: 76 years      Sex: Female      Is Non- : No      Diabetic: Yes      Tobacco smoker: No      Systolic Blood Pressure: 987 mmHg      Is BP treated: Yes      HDL Cholesterol: 55 mg/dL      Total Cholesterol: 171 mg/dL     PLAN  No patient out reach planned at this time. Patient adherent to both atorvastatin and lisinopril/HCTZ therapy. Will sign off.      Makayla Monterroso, PharmD, [facility]  Direct: [phone]  [facility], toll free [phone], option 7        For Pharmacy Admin Tracking Only    PHSO: Yes  Total # of Interventions Recommended: 2  - New Order #: 0 New Medication Order Reason(s): Adherence  - Refills Provided #: 0  - Updated Order #: 0 Updated Order Reason(s):  Other  - Maintenance Safety Lab Monitoring #: 1  - New Therapy Lab Monitoring #: 0  Recommended intervention potential cost savings: 1  Total Interventions Accepted: 0  Time Spent (min): 15

## 2020-11-19 RX ORDER — METOPROLOL SUCCINATE 50 MG/1
50 TABLET, EXTENDED RELEASE ORAL DAILY
Qty: 90 TABLET | Refills: 1 | Status: SHIPPED | OUTPATIENT
Start: 2020-11-19 | End: 2021-04-02 | Stop reason: SDUPTHER

## 2020-11-19 RX ORDER — LISINOPRIL AND HYDROCHLOROTHIAZIDE 20; 12.5 MG/1; MG/1
1 TABLET ORAL 2 TIMES DAILY
Qty: 180 TABLET | Refills: 1 | Status: SHIPPED | OUTPATIENT
Start: 2020-11-19 | End: 2020-12-28 | Stop reason: SDUPTHER

## 2020-11-19 NOTE — TELEPHONE ENCOUNTER
Orders Placed This Encounter   Procedures    Comprehensive Metabolic Panel     Standing Status:   Future     Standing Expiration Date:   11/19/2021    Magnesium     Standing Status:   Future     Standing Expiration Date:   11/19/2021    Lipid, Fasting     Standing Status:   Future     Standing Expiration Date:   11/19/2021    Hemoglobin A1C     Standing Status:   Future     Standing Expiration Date:   11/19/2021    Vitamin D 25 Hydroxy     Standing Status:   Future     Standing Expiration Date:   11/19/2021    TSH without Reflex     Standing Status:   Future     Standing Expiration Date:   2/19/2021    Urinalysis     Standing Status:   Future     Standing Expiration Date:   11/19/2021    Microalbumin, Ur     Standing Status:   Future     Standing Expiration Date:   11/19/2021    CBC Auto Differential     Standing Status:   Future     Standing Expiration Date:   11/19/2021     Lab orders placed     Requested Prescriptions     Signed Prescriptions Disp Refills    metoprolol succinate (TOPROL XL) 50 MG extended release tablet 90 tablet 1     Sig: Take 1 tablet by mouth daily     Authorizing Provider: Mendez CM    lisinopril-hydroCHLOROthiazide (PRINZIDE;ZESTORETIC) 20-12.5 MG per tablet 180 tablet 1     Sig: Take 1 tablet by mouth 2 times daily     Authorizing Provider: Gloris Siemens     Medication prescriptions printed

## 2020-12-17 RX ORDER — BLOOD SUGAR DIAGNOSTIC
STRIP MISCELLANEOUS
Qty: 200 EACH | Refills: 5 | Status: SHIPPED
Start: 2020-12-17 | End: 2021-04-05 | Stop reason: SDUPTHER

## 2020-12-28 RX ORDER — LISINOPRIL AND HYDROCHLOROTHIAZIDE 20; 12.5 MG/1; MG/1
1 TABLET ORAL 2 TIMES DAILY
Qty: 180 TABLET | Refills: 1 | Status: SHIPPED
Start: 2020-12-28 | End: 2021-03-31 | Stop reason: SDUPTHER

## 2021-02-02 ENCOUNTER — IMMUNIZATION (OUTPATIENT)
Dept: PRIMARY CARE CLINIC | Age: 76
End: 2021-02-02
Payer: MEDICARE

## 2021-02-02 DIAGNOSIS — Z23 NEED FOR VACCINATION: Primary | ICD-10-CM

## 2021-02-02 PROCEDURE — 0001A COVID-19, PFIZER VACCINE 30MCG/0.3ML DOSE: CPT | Performed by: CLINICAL NURSE SPECIALIST

## 2021-02-02 PROCEDURE — 91300 COVID-19, PFIZER VACCINE 30MCG/0.3ML DOSE: CPT | Performed by: CLINICAL NURSE SPECIALIST

## 2021-02-23 ENCOUNTER — IMMUNIZATION (OUTPATIENT)
Dept: PRIMARY CARE CLINIC | Age: 76
End: 2021-02-23
Payer: MEDICARE

## 2021-02-23 PROCEDURE — 0002A COVID-19, PFIZER VACCINE 30MCG/0.3ML DOSE: CPT | Performed by: CLINICAL NURSE SPECIALIST

## 2021-02-23 PROCEDURE — 91300 COVID-19, PFIZER VACCINE 30MCG/0.3ML DOSE: CPT | Performed by: CLINICAL NURSE SPECIALIST

## 2021-03-24 DIAGNOSIS — I10 ESSENTIAL HYPERTENSION: ICD-10-CM

## 2021-03-24 DIAGNOSIS — Z79.4 TYPE 2 DIABETES MELLITUS WITH STAGE 2 CHRONIC KIDNEY DISEASE, WITH LONG-TERM CURRENT USE OF INSULIN (HCC): ICD-10-CM

## 2021-03-24 DIAGNOSIS — E78.2 MIXED HYPERLIPIDEMIA: ICD-10-CM

## 2021-03-24 DIAGNOSIS — E11.22 TYPE 2 DIABETES MELLITUS WITH STAGE 2 CHRONIC KIDNEY DISEASE, WITH LONG-TERM CURRENT USE OF INSULIN (HCC): ICD-10-CM

## 2021-03-24 DIAGNOSIS — E11.9 TYPE 2 DIABETES MELLITUS WITHOUT COMPLICATION, WITH LONG-TERM CURRENT USE OF INSULIN (HCC): ICD-10-CM

## 2021-03-24 DIAGNOSIS — R53.83 OTHER FATIGUE: ICD-10-CM

## 2021-03-24 DIAGNOSIS — N18.2 TYPE 2 DIABETES MELLITUS WITH STAGE 2 CHRONIC KIDNEY DISEASE, WITH LONG-TERM CURRENT USE OF INSULIN (HCC): ICD-10-CM

## 2021-03-24 DIAGNOSIS — E55.9 VITAMIN D DEFICIENCY: ICD-10-CM

## 2021-03-24 DIAGNOSIS — Z79.4 TYPE 2 DIABETES MELLITUS WITHOUT COMPLICATION, WITH LONG-TERM CURRENT USE OF INSULIN (HCC): ICD-10-CM

## 2021-03-24 LAB
ALBUMIN SERPL-MCNC: 4 G/DL (ref 3.5–5.2)
ALP BLD-CCNC: 163 U/L (ref 35–104)
ALT SERPL-CCNC: 14 U/L (ref 0–32)
ANION GAP SERPL CALCULATED.3IONS-SCNC: 16 MMOL/L (ref 7–16)
AST SERPL-CCNC: 16 U/L (ref 0–31)
BACTERIA: NORMAL /HPF
BASOPHILS ABSOLUTE: 0.04 E9/L (ref 0–0.2)
BASOPHILS RELATIVE PERCENT: 0.5 % (ref 0–2)
BILIRUB SERPL-MCNC: 0.4 MG/DL (ref 0–1.2)
BILIRUBIN URINE: NEGATIVE
BLOOD, URINE: NEGATIVE
BUN BLDV-MCNC: 25 MG/DL (ref 8–23)
CALCIUM SERPL-MCNC: 9.4 MG/DL (ref 8.6–10.2)
CHLORIDE BLD-SCNC: 100 MMOL/L (ref 98–107)
CHOLESTEROL, FASTING: 175 MG/DL (ref 0–199)
CLARITY: CLEAR
CO2: 25 MMOL/L (ref 22–29)
COLOR: YELLOW
CREAT SERPL-MCNC: 1 MG/DL (ref 0.5–1)
EOSINOPHILS ABSOLUTE: 0.4 E9/L (ref 0.05–0.5)
EOSINOPHILS RELATIVE PERCENT: 5 % (ref 0–6)
GFR AFRICAN AMERICAN: >60
GFR NON-AFRICAN AMERICAN: 54 ML/MIN/1.73
GLUCOSE BLD-MCNC: 204 MG/DL (ref 74–99)
GLUCOSE URINE: NEGATIVE MG/DL
HBA1C MFR BLD: 11.4 % (ref 4–5.6)
HCT VFR BLD CALC: 41.1 % (ref 34–48)
HDLC SERPL-MCNC: 54 MG/DL
HEMOGLOBIN: 12.8 G/DL (ref 11.5–15.5)
IMMATURE GRANULOCYTES #: 0.03 E9/L
IMMATURE GRANULOCYTES %: 0.4 % (ref 0–5)
KETONES, URINE: NEGATIVE MG/DL
LDL CHOLESTEROL CALCULATED: 97 MG/DL (ref 0–99)
LEUKOCYTE ESTERASE, URINE: ABNORMAL
LYMPHOCYTES ABSOLUTE: 2.11 E9/L (ref 1.5–4)
LYMPHOCYTES RELATIVE PERCENT: 26.5 % (ref 20–42)
MAGNESIUM: 1.6 MG/DL (ref 1.6–2.6)
MCH RBC QN AUTO: 27.5 PG (ref 26–35)
MCHC RBC AUTO-ENTMCNC: 31.1 % (ref 32–34.5)
MCV RBC AUTO: 88.2 FL (ref 80–99.9)
MICROALBUMIN UR-MCNC: 12.9 MG/L
MONOCYTES ABSOLUTE: 0.43 E9/L (ref 0.1–0.95)
MONOCYTES RELATIVE PERCENT: 5.4 % (ref 2–12)
NEUTROPHILS ABSOLUTE: 4.96 E9/L (ref 1.8–7.3)
NEUTROPHILS RELATIVE PERCENT: 62.2 % (ref 43–80)
NITRITE, URINE: NEGATIVE
PDW BLD-RTO: 13.6 FL (ref 11.5–15)
PH UA: 5.5 (ref 5–9)
PLATELET # BLD: 204 E9/L (ref 130–450)
PMV BLD AUTO: 11.1 FL (ref 7–12)
POTASSIUM SERPL-SCNC: 3.3 MMOL/L (ref 3.5–5)
PROTEIN UA: NEGATIVE MG/DL
RBC # BLD: 4.66 E12/L (ref 3.5–5.5)
RBC UA: NORMAL /HPF (ref 0–2)
SODIUM BLD-SCNC: 141 MMOL/L (ref 132–146)
SPECIFIC GRAVITY UA: 1.01 (ref 1–1.03)
TOTAL PROTEIN: 7 G/DL (ref 6.4–8.3)
TRIGLYCERIDE, FASTING: 122 MG/DL (ref 0–149)
TSH SERPL DL<=0.05 MIU/L-ACNC: 3.97 UIU/ML (ref 0.27–4.2)
UROBILINOGEN, URINE: 0.2 E.U./DL
VITAMIN D 25-HYDROXY: 40 NG/ML (ref 30–100)
VLDLC SERPL CALC-MCNC: 24 MG/DL
WBC # BLD: 8 E9/L (ref 4.5–11.5)
WBC UA: NORMAL /HPF (ref 0–5)

## 2021-03-27 ENCOUNTER — TELEPHONE (OUTPATIENT)
Dept: FAMILY MEDICINE CLINIC | Age: 76
End: 2021-03-27

## 2021-03-27 LAB — ALDOSTERONE: 15.1 NG/DL

## 2021-03-27 NOTE — TELEPHONE ENCOUNTER
Please let the patient know that blood work results showed    Glucose was elevated. Hemoglobin A1c is a measure of 3-month sugar control was much more uncontrolled now at 11.4. We will need to review medication and insulin as this is very uncontrolled with goal of 7. Cholesterol levels were fair. HDL or good cholesterol was borderline to beneficial.  Would recommend to continue present medication and continue to watch diet    Labs still showed slight kidney dysfunction, level was at [facility] stable and slightly improved    Potassium level was low. May need to consider potassium supplement    Thyroid labs were normal    Vitamin D levels were normal    Other electrolytes and liver functions were normal    Blood counts were normal    Urine analysis showed slight white blood cells, which is nonspecific.   No evidence for microscopic blood or protein    Thanks

## 2021-03-29 DIAGNOSIS — E11.9 TYPE 2 DIABETES MELLITUS WITHOUT COMPLICATION, WITH LONG-TERM CURRENT USE OF INSULIN (HCC): ICD-10-CM

## 2021-03-29 DIAGNOSIS — E78.2 MIXED HYPERLIPIDEMIA: ICD-10-CM

## 2021-03-29 DIAGNOSIS — Z79.4 TYPE 2 DIABETES MELLITUS WITHOUT COMPLICATION, WITH LONG-TERM CURRENT USE OF INSULIN (HCC): ICD-10-CM

## 2021-03-29 DIAGNOSIS — I10 BENIGN ESSENTIAL HYPERTENSION: ICD-10-CM

## 2021-03-29 NOTE — TELEPHONE ENCOUNTER
[facility] CLINICAL PHARMACY REVIEW: ADHERENCE REVIEW  Identified care gap per [employer]; fills at [facility]Preferred Pharmacy [facility]: Diabetes and Statin adherence    Last Visit: unknown    Patient identified as LIS = 1, therefore patient may be able to receive a 90-day supply for the same cost as a 30-day supply    Patient found in Outcomes MTM and is not currently eligible for CMR/TIP    ASSESSMENT  ACE/ARB ADHERENCE    Lisinopril/hctz 20/12.5mg last filled on 12/28/20 for 90 day supply. Next refill due: 3/29/21  Dr. Robert Mitchell    Per GE:  Due now. 1 RF  Staff will fill today for the first fill of 2021. Billed to [facility]. 0 remaining    BP Readings from Last 3 Encounters:   02/06/20 134/88   11/13/19 138/80   11/07/19 (!) 140/80     CrCl cannot be calculated (Unknown ideal weight.). DIABETES ADHERENCE    (Also on 70/30 Insulin)    Per Insurance Records through March 2021 (2020 [facility] = ?%; YTD PDC = 100%): METFORMIN    TAB 500MG last filled on 1/26/21 for 90 day supply. Next refill due: 3/12/21  Dr. Gayland Nageotte    [address]:   Now past due 3/12 NR    Lab Results   Component Value Date    LABA1C 11.4 (H) 03/24/2021    LABA1C 9.3 (H) 09/04/2020    LABA1C 10.4 (H) 01/31/2020     NOTE A1c <9%    STATIN ADHERENCE    Per Insurance Records through March 2121 (2020 [facility] = 92%; YTD PDC = 100%):   ATORVASTATIN TAB 40MG last filled on 1/26/21 for 90 day supply. Next refill due: 4/26/21  Dr. Robert Mitchell    Per GE:  0 RF    Lab Results   Component Value Date    HDL 54 03/24/2021    [address] 97 03/24/2021     ALT   Date Value Ref Range Status   03/24/2021 14 0 - 32 U/L Final     AST   Date Value Ref Range Status   03/24/2021 16 0 - 31 U/L Final     The ASCVD Risk score (Basim Sue., et al., 2013) failed to calculate for the following reasons:     The systolic blood pressure is missing     PLAN  The following are interventions that have been identified:   - Patient overdue refilling metformin 500mg and active on home medication list.   - Patient eligible for 90 day supply of lisinopril/hctz, metformin, atorvastatin     Reached patient to review. Verified medication instructions- will  last refill of lisinopril/hctz. Will route to PharmD to facilitate 90 day supply on the following:  Lisinopril/hctz 20/12.5mg  METFORMIN    TAB 500MG  ATORVASTATIN TAB 40MG    (I will follow up with pharmacy for refill on metformin)    Future Appointments   Date Time Provider Corey Marina   4/2/2021 10:00 AM MD LOUIS Antonio [facility]   4/5/2021  1:00 PM Matt Kendall MD BDM ENDO [facility]       Haven Alcazar [facility].    [address] [facility], toll free: [phone], option 7

## 2021-03-31 DIAGNOSIS — E11.9 TYPE 2 DIABETES MELLITUS WITHOUT COMPLICATION, WITH LONG-TERM CURRENT USE OF INSULIN (HCC): ICD-10-CM

## 2021-03-31 DIAGNOSIS — Z79.4 TYPE 2 DIABETES MELLITUS WITHOUT COMPLICATION, WITH LONG-TERM CURRENT USE OF INSULIN (HCC): ICD-10-CM

## 2021-03-31 RX ORDER — ATORVASTATIN CALCIUM 40 MG/1
40 TABLET, FILM COATED ORAL DAILY
Qty: 90 TABLET | Refills: 1 | Status: SHIPPED
Start: 2021-03-31 | End: 2021-04-02 | Stop reason: SDUPTHER

## 2021-03-31 RX ORDER — LISINOPRIL AND HYDROCHLOROTHIAZIDE 20; 12.5 MG/1; MG/1
1 TABLET ORAL 2 TIMES DAILY
Qty: 180 TABLET | Refills: 1 | Status: SHIPPED
Start: 2021-03-31 | End: 2021-04-02 | Stop reason: SDUPTHER

## 2021-03-31 NOTE — TELEPHONE ENCOUNTER
Leonardo Otero MD - OK for refill Rxs of lisinopril-HCTZ and atorvastatin? No refills remaining on previous Rxs. Order pending for your convenience. LOV 2/6/20  Next scheduled 4/2/21    Thank you!   Marni Guerrero, PharmD, [facility]  Department, toll free: [phone], option 7

## 2021-03-31 NOTE — TELEPHONE ENCOUNTER
Dr. Oriana Cox - OK for refill Rx of metformin? No refills on previous Rx, pt overdue for refill. Order pending for your convenience if you agree. LOV 9/8/20  Next 4/5/21    Thank you!   Brian Barton, PharmD, [facility]  Department, toll free: [phone], option 7

## 2021-04-01 LAB — RENIN ACTIVITY: 2.4 NG/ML/HR

## 2021-04-02 ENCOUNTER — OFFICE VISIT (OUTPATIENT)
Dept: PRIMARY CARE CLINIC | Age: 76
End: 2021-04-02
Payer: MEDICARE

## 2021-04-02 VITALS
RESPIRATION RATE: 16 BRPM | HEIGHT: 63 IN | HEART RATE: 74 BPM | WEIGHT: 208.6 LBS | OXYGEN SATURATION: 97 % | TEMPERATURE: 97.3 F | SYSTOLIC BLOOD PRESSURE: 130 MMHG | BODY MASS INDEX: 36.96 KG/M2 | DIASTOLIC BLOOD PRESSURE: 78 MMHG

## 2021-04-02 DIAGNOSIS — N18.2 STAGE 2 CHRONIC KIDNEY DISEASE: ICD-10-CM

## 2021-04-02 DIAGNOSIS — E78.2 MIXED HYPERLIPIDEMIA: Primary | ICD-10-CM

## 2021-04-02 DIAGNOSIS — N18.2 TYPE 2 DIABETES MELLITUS WITH STAGE 2 CHRONIC KIDNEY DISEASE, WITH LONG-TERM CURRENT USE OF INSULIN (HCC): ICD-10-CM

## 2021-04-02 DIAGNOSIS — R53.83 OTHER FATIGUE: ICD-10-CM

## 2021-04-02 DIAGNOSIS — N85.02 ENDOMETRIAL INTRAEPITHELIAL NEOPLASIA (EIN): ICD-10-CM

## 2021-04-02 DIAGNOSIS — H61.23 BILATERAL IMPACTED CERUMEN: ICD-10-CM

## 2021-04-02 DIAGNOSIS — E11.22 TYPE 2 DIABETES MELLITUS WITH STAGE 2 CHRONIC KIDNEY DISEASE, WITH LONG-TERM CURRENT USE OF INSULIN (HCC): ICD-10-CM

## 2021-04-02 DIAGNOSIS — E55.9 VITAMIN D DEFICIENCY: ICD-10-CM

## 2021-04-02 DIAGNOSIS — Z12.11 SCREENING FOR MALIGNANT NEOPLASM OF COLON: ICD-10-CM

## 2021-04-02 DIAGNOSIS — Z79.4 TYPE 2 DIABETES MELLITUS WITH STAGE 2 CHRONIC KIDNEY DISEASE, WITH LONG-TERM CURRENT USE OF INSULIN (HCC): ICD-10-CM

## 2021-04-02 DIAGNOSIS — I10 BENIGN ESSENTIAL HYPERTENSION: ICD-10-CM

## 2021-04-02 PROBLEM — E66.01 MORBIDLY OBESE (HCC): Status: RESOLVED | Noted: 2020-09-08 | Resolved: 2021-04-02

## 2021-04-02 PROCEDURE — 69209 REMOVE IMPACTED EAR WAX UNI: CPT | Performed by: INTERNAL MEDICINE

## 2021-04-02 PROCEDURE — 99214 OFFICE O/P EST MOD 30 MIN: CPT | Performed by: INTERNAL MEDICINE

## 2021-04-02 RX ORDER — ATORVASTATIN CALCIUM 40 MG/1
40 TABLET, FILM COATED ORAL DAILY
Qty: 90 TABLET | Refills: 1 | Status: SHIPPED | OUTPATIENT
Start: 2021-04-02 | End: 2021-12-03 | Stop reason: SDUPTHER

## 2021-04-02 RX ORDER — METOPROLOL SUCCINATE 50 MG/1
50 TABLET, EXTENDED RELEASE ORAL DAILY
Qty: 90 TABLET | Refills: 1 | Status: SHIPPED | OUTPATIENT
Start: 2021-04-02 | End: 2021-11-02 | Stop reason: SDUPTHER

## 2021-04-02 RX ORDER — LISINOPRIL AND HYDROCHLOROTHIAZIDE 20; 12.5 MG/1; MG/1
1 TABLET ORAL 2 TIMES DAILY
Qty: 180 TABLET | Refills: 1 | Status: SHIPPED | OUTPATIENT
Start: 2021-04-02 | End: 2021-10-12 | Stop reason: SDUPTHER

## 2021-04-02 RX ORDER — HYDRALAZINE HYDROCHLORIDE 10 MG/1
10 TABLET, FILM COATED ORAL 2 TIMES DAILY
Qty: 60 TABLET | Refills: 2 | Status: SHIPPED | OUTPATIENT
Start: 2021-04-02 | End: 2021-09-08 | Stop reason: SDUPTHER

## 2021-04-02 ASSESSMENT — ENCOUNTER SYMPTOMS
NAUSEA: 0
SHORTNESS OF BREATH: 0
DIARRHEA: 1
ABDOMINAL PAIN: 0
EYE PAIN: 0
RHINORRHEA: 0
CONSTIPATION: 0
SORE THROAT: 0
VOMITING: 0
CHEST TIGHTNESS: 0
COUGH: 0
BLOOD IN STOOL: 0

## 2021-04-02 ASSESSMENT — PATIENT HEALTH QUESTIONNAIRE - PHQ9
SUM OF ALL RESPONSES TO PHQ QUESTIONS 1-9: 0
1. LITTLE INTEREST OR PLEASURE IN DOING THINGS: 0
2. FEELING DOWN, DEPRESSED OR HOPELESS: 0

## 2021-04-02 NOTE — PROGRESS NOTES
[facility] Physicians   Internal Medicine     2021  Veronica Viramontes : 1945 Sex: female  Age:73 y.o. Chief Complaint   Patient presents with    Diabetes    Hyperlipidemia    Hypertension        HPI:   Patient presents to office for evaluation of the following medical concerns. -  has been diagnosed with macular degeneration.  has seen retinal specialist and receiving injections. Possible Unsure if Retinopathy.    -  has DM. Following with endocrinology for Diabetes. States monitors sugars at home. States on 70/30 insulin increased to 30 units in am and 20 units in pm and metformin. Stopped Tradjenta. Adjusted novalog Slide scale if over 200.  has had episodes of hypoglycemia since last visit. Not always compliant with diet. -  has hyperlipidemia. Now on atorvastatin for high intensity statin. Trying to watch diet. No reported side effects. Frequently forgets to take at bedtime.    -  has high blood pressure. [facility] [place] does randomly check blood pressure at home. States 130/80's. On lisinopril and HCTZ and metoprolol.  has some fatigue.    - States right shoulder pain has improved. - Labs show kidney dysfunction, last lab (3/2021) - improved.      - lab work from 3/24/2021 reviewed with patient 2021      Health Maintenance   - immunizations:   Influenza Vaccination - (10/20/2016) , (2017), (2018), (2019), (2020)   Pneumonia Vaccination - () - prevnar, (2019) - pneumococcal 23   Zoster/Shingles Vaccine  Tetanus Vaccination  covid (2021) #1, (2021) #2 - pfizer     - Screenings:   Bone Density Scan - () - normal  Pelvic/Pap Exam - (), () - gyn  Mammogram - () - gyn    Colonoscopy - declines  FIT (2019) - negative, (2021) - order     Couseled on Home Safety  - (2017)    Dilated Eye Exam - (2017) - Wet AMD, diabetes, cataract, (2018) - no Retinopathy, wet AMD (2018)    ROS:  Review of Systems Constitutional: Negative for appetite change, chills, fever and unexpected weight change. HENT: Negative for congestion, rhinorrhea and sore throat. Eyes: Negative for pain and visual disturbance. Respiratory: Negative for cough, chest tightness and shortness of breath. Cardiovascular: Negative for chest pain and palpitations. Gastrointestinal: Positive for diarrhea. Negative for abdominal pain, blood in stool, constipation, nausea and vomiting. Genitourinary: Negative for difficulty urinating, dysuria, frequency, pelvic pain, urgency and vaginal bleeding. Musculoskeletal: Negative for arthralgias and myalgias. Skin: Negative for rash. Neurological: Negative for dizziness, syncope, weakness, light-headedness, numbness and headaches. Hematological: Negative for adenopathy. Psychiatric/Behavioral: Negative for suicidal ideas. The patient is not nervous/anxious.           Current Outpatient Medications:     atorvastatin (LIPITOR) 40 MG tablet, Take 1 tablet by mouth daily, Disp: 90 tablet, Rfl: 1    hydrALAZINE (APRESOLINE) 10 MG tablet, Take 1 tablet by mouth 2 times daily, Disp: 60 tablet, Rfl: 2    lisinopril-hydroCHLOROthiazide (PRINZIDE;ZESTORETIC) 20-12.5 MG per tablet, Take 1 tablet by mouth 2 times daily, Disp: 180 tablet, Rfl: 1    metoprolol succinate (TOPROL XL) 50 MG extended release tablet, Take 1 tablet by mouth daily, Disp: 90 tablet, Rfl: 1    metFORMIN (GLUCOPHAGE) 500 MG tablet, Take 1 tablet by mouth 2 times daily (with meals), Disp: 180 tablet, Rfl: 1    blood glucose test strips (ONETOUCH VERIO) strip, Use to test blood sugar 4 times a day., Disp: 200 each, Rfl: 5    insulin 70-30 (NOVOLIN 70/30) (70-30) 100 UNIT per ML injection vial, 30 units before breakfast and 20 units before dinner, Disp: 4 vial, Rfl: 2    Insulin Syringe-Needle U-100 (INSULIN SYRINGE .5CC/31GX5/16\") 31G X 5/16\" 0.5 ML MISC, Use to take insulin 3x daily, Disp: 300 each, Rfl: 3    insulin aspart (NOVOLOG) 100 UNIT/ML injection vial, Take for correction of B unit for every 30 mg/dl the BG is >140 mg/dl (max of 15 units/day), Disp: 1 vial, Rfl: 3    Multiple Vitamins-Minerals (PRESERVISION AREDS 2 PO), Take by mouth 2 times daily, Disp: , Rfl:     vitamin D (CHOLECALCIFEROL) 1000 UNIT TABS tablet, Take 1,000 Units by mouth daily, Disp: , Rfl:     Allergies   Allergen Reactions    Penicillins Hives and Other (See Comments)     EDEMA EXTREMITIES       Past Medical History:   Diagnosis Date    Bilateral nonexudative age-related macular degeneration 2019    HLD (hyperlipidemia)     HTN (hypertension)     Stage 2 chronic kidney disease 2019    [facility]type 2 [facility] mellitus) [facility]     Uterine cancer ([facility]     s/p surgery    Vitamin D deficiency 2019       Past Surgical History:   Procedure Laterality Date    HYSTERECTOMY, TOTAL ABDOMINAL      TONSILLECTOMY         Family History   Problem Relation Age of Onset    Heart Disease Mother         late onset,  in 80's   [facility] Coronary Art Dis Mother     Heart Disease Father         late onset,  in 80's    Dementia Father     Coronary Art Dis Father     No Known Problems Son     No Known Problems Son     No Known Problems Son        Social History     Socioeconomic History    Marital status:      Spouse name: Not on file    Number of children: 3    Years of education: Not on file    Highest education level: Not on file   Occupational History    Occupation:    Social Needs    Financial resource strain: Not on file    Food insecurity     Worry: Not on file     Inability: Not on file    Transportation needs     Medical: Not on file     Non-medical: Not on file   Tobacco Use    Smoking status: Never Smoker    Smokeless tobacco: Never Used   Substance and Sexual Activity    Alcohol use: No     Comment: Social very rare    Drug use: No    Sexual activity: Never   Lifestyle    Physical activity Days per week: Not on file     Minutes per session: Not on file    Stress: Not on file   Relationships    Social connections     Talks on phone: Not on file     Gets together: Not on file     Attends Bahai service: Not on file     Active member of club or organization: Not on file     Attends meetings of clubs or organizations: Not on file     Relationship status: Not on file    Intimate partner violence     Fear of current or ex partner: Not on file     Emotionally abused: Not on file     Physically abused: Not on file     Forced sexual activity: Not on file   Other Topics Concern    Not on file   Social History Narrative    Lives in a house in [place] alone       Vitals:    04/02/21 1001   BP: 130/78   Pulse: 74   Resp: 16   Temp: 97.3 °F (36.3 °C)   SpO2: 97%   Weight: 208 lb 9.6 oz (94.6 kg)   Height: 5' 2.5\" (1.588 m)       Exam:  Physical Exam  Vitals signs reviewed. Constitutional:       Appearance: She is well-developed. HENT:      Head: Normocephalic and atraumatic. Right Ear: External ear normal. There is impacted cerumen. Left Ear: External ear normal. There is impacted cerumen. Eyes:      Pupils: Pupils are equal, round, and reactive to light. Neck:      Musculoskeletal: Normal range of motion and neck supple. Thyroid: No thyromegaly. Cardiovascular:      Rate and Rhythm: Normal rate and regular rhythm. Heart sounds: Normal heart sounds. No murmur. Pulmonary:      Effort: Pulmonary effort is normal.      Breath sounds: Normal breath sounds. No wheezing. Abdominal:      General: Bowel sounds are normal. There is no distension. Palpations: Abdomen is soft. Tenderness: There is no abdominal tenderness. There is no guarding or rebound. Musculoskeletal: Normal range of motion. Lymphadenopathy:      Cervical: No cervical adenopathy. Skin:     General: Skin is warm and dry.    Neurological:      Mental Status: She is alert and oriented to person, place,

## 2021-04-02 NOTE — TELEPHONE ENCOUNTER
CLINICAL PHARMACY CONSULT: MED RECONCILIATION/REVIEW ADDENDUM    For Pharmacy Admin Tracking Only    PHSO: Yes  Total # of Interventions Recommended: 2  - New Order #: 1 New Medication Order Reason(s): Adherence  - Refills Provided #: 1  Recommended intervention potential cost savings: 1  Total Interventions Accepted: 2  Time Spent (min): 15    Haven Alcazar CPhT.   [address]

## 2021-04-02 NOTE — TELEPHONE ENCOUNTER
Lalit Vides MD authorized 90DS metformin    Patient saw PCP today (4/2/21)    Called to [address] [place] [address]. Staff has both 90 day supplies of lisinopril and metformin ready for .     Notified patient 2 refills were ready for  at [address] [place] [address].

## 2021-04-05 ENCOUNTER — OFFICE VISIT (OUTPATIENT)
Dept: ENDOCRINOLOGY | Age: 76
End: 2021-04-05
Payer: MEDICARE

## 2021-04-05 VITALS
WEIGHT: 210.8 LBS | SYSTOLIC BLOOD PRESSURE: 162 MMHG | HEART RATE: 85 BPM | DIASTOLIC BLOOD PRESSURE: 86 MMHG | BODY MASS INDEX: 37.35 KG/M2 | TEMPERATURE: 97 F | HEIGHT: 63 IN

## 2021-04-05 DIAGNOSIS — Z79.4 TYPE 2 DIABETES MELLITUS WITHOUT COMPLICATION, WITH LONG-TERM CURRENT USE OF INSULIN (HCC): Primary | ICD-10-CM

## 2021-04-05 DIAGNOSIS — E11.9 TYPE 2 DIABETES MELLITUS WITHOUT COMPLICATION, WITH LONG-TERM CURRENT USE OF INSULIN (HCC): Primary | ICD-10-CM

## 2021-04-05 DIAGNOSIS — E11.9 TYPE 2 DIABETES MELLITUS WITHOUT COMPLICATION, WITH LONG-TERM CURRENT USE OF INSULIN (HCC): ICD-10-CM

## 2021-04-05 DIAGNOSIS — Z79.4 TYPE 2 DIABETES MELLITUS WITHOUT COMPLICATION, WITH LONG-TERM CURRENT USE OF INSULIN (HCC): ICD-10-CM

## 2021-04-05 PROCEDURE — 99214 OFFICE O/P EST MOD 30 MIN: CPT | Performed by: INTERNAL MEDICINE

## 2021-04-05 RX ORDER — BLOOD SUGAR DIAGNOSTIC
STRIP MISCELLANEOUS
Qty: 200 EACH | Refills: 5 | Status: SHIPPED | OUTPATIENT
Start: 2021-04-05 | End: 2022-01-24 | Stop reason: SDUPTHER

## 2021-04-05 RX ORDER — LANCETS 30 GAUGE
EACH MISCELLANEOUS
Qty: 200 EACH | Refills: 3 | Status: SHIPPED | OUTPATIENT
Start: 2021-04-05

## 2021-04-05 RX ORDER — NAPROXEN SODIUM 220 MG
TABLET ORAL
Qty: 300 EACH | Refills: 3 | Status: SHIPPED | OUTPATIENT
Start: 2021-04-05 | End: 2021-06-01

## 2021-04-05 RX ORDER — FLASH GLUCOSE SENSOR
KIT MISCELLANEOUS
Qty: 7 EACH | Refills: 3 | Status: SHIPPED
Start: 2021-04-05 | End: 2022-03-18 | Stop reason: ALTCHOICE

## 2021-04-05 RX ORDER — FLASH GLUCOSE SCANNING READER
EACH MISCELLANEOUS
Qty: 1 EACH | Refills: 0 | Status: SHIPPED
Start: 2021-04-05 | End: 2022-11-04

## 2021-04-05 NOTE — PROGRESS NOTES
CC:  T2DM    BACKGROUND:  Last visit: 09/08/20  First visit: 01/29/19    · T2DM -   Diagnosed 1995  a1c  11.4% 3/24/21, 9.3% 9/4/20,  10.4% 1/31/20,   10.6% 11/13/19,   10.4% 7/16/19  Fingersticks: 1-2x/day (usually 1x), log reviewed: Fasting: low to mid-100's  Hypoglycemia: no hypoglycemic  Regimen: insulin 70/30 30units before breakfast and 20 units before dinner; has not used Novolog since the beginning of Jan 2021 ;metformin 500 mg with B and 746 mg with L  Complications: nephropathy  Eyes:        2/2021, no DR (sees ophtho every three months for macular degeneration)  Kidneys:   3/24/21 30/1.2, GFR 44 (was 44 1/31/20)  UACR:      9/4/20 29.6  Statin:       Atorvastatin 40mg daily  Ace-I:        Lisinopril 20 mg daily  BP:           +HTN, Controlled on Lisinopril, HCTZ, Metoprolol, Hydralazine  Feet:         9/08/20, Good monofilament sensation b/l, no ulcers or calluses, +onychomycosis b/l (did not check today b/c she is wearing hose)    Initial Diet:    Number of meals per day - 3    Number of snacks per day - 1-2    Meal volume - 8\" plate, 0 seconds    Fast food/convenience store - 2x/week    Restaurants (not fast food) - 0x/week   Sweets - 0d/week   Chips - 2d/week (1 week/mo)   Crackers/pretzels - 0d/week   Nuts - 0d/week   Peanut Butter - 0d/week   Popcorn - 0d/week   Dried fruit - 0d/week   Whole fruit - 7d/week (1 piece)   Breakfast cereal - 0d/week   Granola/Protein/Energy bar - 0d/week   Rice - 2d/week   Potato - 2d/week   Bread - 7d/week (two slices)   Corn - 0d/week   Beans - 0d/week   Pasta - 2-3d/week   Sugar sweetened beverages - none   Protein - No supplements   Fiber - No supplements     Exercise:    Gym membership - none    Walking - none (stationary cycle 20 min, 3d/week)    PFSH:  Medical problem: T2DM, HTN, HLD  Surgeries: no pertinent surgeries  Fam Hx: no inheritable diseases  Soc Hx: non-smoker, retired  Medications: Insulin 70-30, Novolog prn, Lisinopril-HCTZ, Metoprolol, Atorvostatin, Hydralazine    PHYSICAL EXAMINATION:  Vital Signs: BP (!) 162/86 (Site: Left Upper Arm, Position: Sitting, Cuff Size: Medium Adult)   Pulse 85   Temp 97 °F (36.1 °C) (Temporal)   Ht 5' 2.5\" (1.588 m)   Wt 210 lb 12.8 oz (95.6 kg)   BMI 37.94 kg/m²       Constitutional: [x] Appears well-developed and well-nourished [x] No apparent distress        Mental status: [x] Alert and awake  [x] Oriented to person/place/time [x]Able to follow commands            Psychiatric:     [x] Normal mood [x] Full affect      HISTORY & ASSESSMENT/PLAN:    1. T2DM - uncontrolled  See above for summary of care  Surveillance and preventive care: up to date  Fingerstick BG readings are again close to target range and the a1c is high. Therefore, need to complete a 14d Galina study  Does not eat a bedtime snack b/c she states it makes her BG too high  Does eat a snack before activity  Plan:  Check BG Fasting and pre-dinner every day, 2-3 times each week also check before bedtime  Cont insulin regimen to: insulin 70/30 30units before breakfast and 20 units before dinner  For correcting BG, take Novolog per the following scale: 1 unit for every 30 mg/dl the BG is >140 mg/dl   Cont metformin 500 mg to one tablet with breakfast and one tablet with dinner  Restrict carbs to <120 grams per day (<40 grams per meal)  15 min prior to heavy physical activity, eat 1 sheet of Inocencio cracker + 1 slice of American cheese or eat 3 Keebler-type cheese crackers  Eat 1/2 sheet of a Inocencio cracker + 1/2 slice of cheese at bed time each night. Complete a 2 week continuous glucose study  Begin to use the Ahaali system for monitoring BG      2. Resistant HTN - uncontrolled per todays reading, but she states it is typically   Presently taking 4 anti-hypertensive agents: metoprolol, hydralazine, Lisinopril and HCTZ   Aldosterone and PRA do not indicate the presence of hyperaldosteronism  Plan:  No need for further work up    3.   Follow up  Return to see me in 6 months    Total time of encounter: 4/5/21    An  electronic signature was used to authenticate this note.     --Joanie Garcia MD on 4/5/2021 at 1:16 PM

## 2021-04-05 NOTE — PATIENT INSTRUCTIONS
Check BG Fasting and pre-dinner every day, 2-3 times each week also check before bedtime  Cont insulin regimen to: insulin 70/30 30units before breakfast and 20 units before dinner  For correcting BG, take Novolog per the following scale: 1 unit for every 30 mg/dl the BG is >140 mg/dl   Cont metformin 500 mg to one tablet with breakfast and one tablet with dinner  Restrict carbs to <120 grams per day (<40 grams per meal)  15 min prior to heavy physical activity, eat 1 sheet of Inocencio cracker + 1 slice of American cheese or eat 3 Keebler-type cheese crackers  Eat 1/2 sheet of a Inocencio cracker + 1/2 slice of cheese at bed time each night.   Complete a 2 week continuous glucose study  Begin to use the [employer] system for monitoring BG    Send me BG logs (or downloads) every 2-6 weeks

## 2021-04-26 ENCOUNTER — TELEPHONE (OUTPATIENT)
Dept: ENDOCRINOLOGY | Age: 76
End: 2021-04-26

## 2021-05-02 NOTE — TELEPHONE ENCOUNTER
SEANM for pt instructing her to come to the clinic for a download (or review) of her glucometer by the nursing staff  [facility] correlates well with the a1c. Therefore, the glucometer is inaccurate if it is indeed showing her BG readings to be in target range.   Brandon 45  05/02/21

## 2021-06-01 DIAGNOSIS — E11.9 TYPE 2 DIABETES MELLITUS WITHOUT COMPLICATION, WITH LONG-TERM CURRENT USE OF INSULIN (HCC): ICD-10-CM

## 2021-06-01 DIAGNOSIS — Z79.4 TYPE 2 DIABETES MELLITUS WITHOUT COMPLICATION, WITH LONG-TERM CURRENT USE OF INSULIN (HCC): ICD-10-CM

## 2021-06-01 RX ORDER — BLOOD SUGAR DIAGNOSTIC
STRIP MISCELLANEOUS
Qty: 100 EACH | Refills: 3 | Status: SHIPPED
Start: 2021-06-01 | End: 2022-07-08 | Stop reason: SDUPTHER

## 2021-09-08 DIAGNOSIS — I10 BENIGN ESSENTIAL HYPERTENSION: ICD-10-CM

## 2021-09-08 RX ORDER — HYDRALAZINE HYDROCHLORIDE 10 MG/1
10 TABLET, FILM COATED ORAL 2 TIMES DAILY
Qty: 60 TABLET | Refills: 2 | Status: SHIPPED
Start: 2021-09-08 | End: 2022-01-18 | Stop reason: SDUPTHER

## 2021-10-12 DIAGNOSIS — I10 BENIGN ESSENTIAL HYPERTENSION: ICD-10-CM

## 2021-10-12 RX ORDER — LISINOPRIL AND HYDROCHLOROTHIAZIDE 20; 12.5 MG/1; MG/1
1 TABLET ORAL 2 TIMES DAILY
Qty: 180 TABLET | Refills: 1 | Status: SHIPPED
Start: 2021-10-12 | End: 2022-03-18 | Stop reason: SDUPTHER

## 2021-10-12 NOTE — TELEPHONE ENCOUNTER
Last Appointment:  4/2/2021  Future Appointments   Date Time Provider Corey Cuevasi   12/14/2021  8:45 AM MD MENDOZA [facility]   1/7/2022  2:30 PM [facility]

## 2021-11-02 DIAGNOSIS — I10 BENIGN ESSENTIAL HYPERTENSION: ICD-10-CM

## 2021-11-02 RX ORDER — METOPROLOL SUCCINATE 50 MG/1
50 TABLET, EXTENDED RELEASE ORAL DAILY
Qty: 90 TABLET | Refills: 0 | Status: SHIPPED
Start: 2021-11-02 | End: 2022-02-01 | Stop reason: SDUPTHER

## 2021-11-02 NOTE — TELEPHONE ENCOUNTER
Name of Medication(s) Requested:  Metoprolol    Pharmacy Requested:   [facility]    Medication(s) pended? [x] Yes  [] No    Last Appointment:  4/2/2021    Future appts:  Future Appointments   Date Time Provider Corey Gray   12/14/2021  8:45 AM Harry Crawley MD [facility]   1/7/2022  2:30 PM MD LOUIS [facility]          Does patient need call back?   [] Yes  [x] No

## 2021-11-11 DIAGNOSIS — E11.9 TYPE 2 DIABETES MELLITUS WITHOUT COMPLICATION, WITH LONG-TERM CURRENT USE OF INSULIN (HCC): ICD-10-CM

## 2021-11-11 DIAGNOSIS — Z79.4 TYPE 2 DIABETES MELLITUS WITHOUT COMPLICATION, WITH LONG-TERM CURRENT USE OF INSULIN (HCC): ICD-10-CM

## 2021-12-03 DIAGNOSIS — E78.2 MIXED HYPERLIPIDEMIA: ICD-10-CM

## 2021-12-03 RX ORDER — ATORVASTATIN CALCIUM 40 MG/1
40 TABLET, FILM COATED ORAL DAILY
Qty: 90 TABLET | Refills: 0 | Status: SHIPPED
Start: 2021-12-03 | End: 2022-03-11 | Stop reason: SDUPTHER

## 2021-12-03 NOTE — TELEPHONE ENCOUNTER
Veronica Joshi calling for a new script for Atorvastatin sent to [facility] in [place]. I have this ready to send.

## 2021-12-10 DIAGNOSIS — E55.9 VITAMIN D DEFICIENCY: ICD-10-CM

## 2021-12-10 DIAGNOSIS — E78.2 MIXED HYPERLIPIDEMIA: ICD-10-CM

## 2021-12-10 DIAGNOSIS — E11.22 TYPE 2 DIABETES MELLITUS WITH STAGE 2 CHRONIC KIDNEY DISEASE, WITH LONG-TERM CURRENT USE OF INSULIN (HCC): ICD-10-CM

## 2021-12-10 DIAGNOSIS — N18.2 TYPE 2 DIABETES MELLITUS WITH STAGE 2 CHRONIC KIDNEY DISEASE, WITH LONG-TERM CURRENT USE OF INSULIN (HCC): ICD-10-CM

## 2021-12-10 DIAGNOSIS — R53.83 OTHER FATIGUE: ICD-10-CM

## 2021-12-10 DIAGNOSIS — Z79.4 TYPE 2 DIABETES MELLITUS WITH STAGE 2 CHRONIC KIDNEY DISEASE, WITH LONG-TERM CURRENT USE OF INSULIN (HCC): ICD-10-CM

## 2021-12-10 LAB
ALBUMIN SERPL-MCNC: 3.9 G/DL (ref 3.5–5.2)
ALP BLD-CCNC: 159 U/L (ref 35–104)
ALT SERPL-CCNC: 18 U/L (ref 0–32)
ANION GAP SERPL CALCULATED.3IONS-SCNC: 13 MMOL/L (ref 7–16)
AST SERPL-CCNC: 22 U/L (ref 0–31)
BACTERIA: NORMAL /HPF
BASOPHILS ABSOLUTE: 0.05 E9/L (ref 0–0.2)
BASOPHILS RELATIVE PERCENT: 0.6 % (ref 0–2)
BILIRUB SERPL-MCNC: 0.4 MG/DL (ref 0–1.2)
BILIRUBIN URINE: NEGATIVE
BLOOD, URINE: NEGATIVE
BUN BLDV-MCNC: 30 MG/DL (ref 6–23)
CALCIUM SERPL-MCNC: 10 MG/DL (ref 8.6–10.2)
CHLORIDE BLD-SCNC: 103 MMOL/L (ref 98–107)
CHOLESTEROL, FASTING: 179 MG/DL (ref 0–199)
CLARITY: CLEAR
CO2: 26 MMOL/L (ref 22–29)
COLOR: YELLOW
CREAT SERPL-MCNC: 1.2 MG/DL (ref 0.5–1)
EOSINOPHILS ABSOLUTE: 0.45 E9/L (ref 0.05–0.5)
EOSINOPHILS RELATIVE PERCENT: 5.5 % (ref 0–6)
GFR AFRICAN AMERICAN: 53
GFR NON-AFRICAN AMERICAN: 44 ML/MIN/1.73
GLUCOSE BLD-MCNC: 122 MG/DL (ref 74–99)
GLUCOSE URINE: NEGATIVE MG/DL
HBA1C MFR BLD: 9.1 % (ref 4–5.6)
HCT VFR BLD CALC: 42.8 % (ref 34–48)
HDLC SERPL-MCNC: 55 MG/DL
HEMOGLOBIN: 13.7 G/DL (ref 11.5–15.5)
IMMATURE GRANULOCYTES #: 0.03 E9/L
IMMATURE GRANULOCYTES %: 0.4 % (ref 0–5)
KETONES, URINE: NEGATIVE MG/DL
LDL CHOLESTEROL CALCULATED: 104 MG/DL (ref 0–99)
LEUKOCYTE ESTERASE, URINE: ABNORMAL
LYMPHOCYTES ABSOLUTE: 2.03 E9/L (ref 1.5–4)
LYMPHOCYTES RELATIVE PERCENT: 24.8 % (ref 20–42)
MAGNESIUM: 1.8 MG/DL (ref 1.6–2.6)
MCH RBC QN AUTO: 28.7 PG (ref 26–35)
MCHC RBC AUTO-ENTMCNC: 32 % (ref 32–34.5)
MCV RBC AUTO: 89.5 FL (ref 80–99.9)
MICROALBUMIN UR-MCNC: 19.3 MG/L
MONOCYTES ABSOLUTE: 0.48 E9/L (ref 0.1–0.95)
MONOCYTES RELATIVE PERCENT: 5.9 % (ref 2–12)
NEUTROPHILS ABSOLUTE: 5.14 E9/L (ref 1.8–7.3)
NEUTROPHILS RELATIVE PERCENT: 62.8 % (ref 43–80)
NITRITE, URINE: NEGATIVE
PDW BLD-RTO: 14 FL (ref 11.5–15)
PH UA: 6 (ref 5–9)
PLATELET # BLD: 212 E9/L (ref 130–450)
PMV BLD AUTO: 11.1 FL (ref 7–12)
POTASSIUM SERPL-SCNC: 4.1 MMOL/L (ref 3.5–5)
PROTEIN UA: NEGATIVE MG/DL
RBC # BLD: 4.78 E12/L (ref 3.5–5.5)
RBC UA: NORMAL /HPF (ref 0–2)
SODIUM BLD-SCNC: 142 MMOL/L (ref 132–146)
SPECIFIC GRAVITY UA: 1.01 (ref 1–1.03)
TOTAL PROTEIN: 8 G/DL (ref 6.4–8.3)
TRIGLYCERIDE, FASTING: 98 MG/DL (ref 0–149)
TSH SERPL DL<=0.05 MIU/L-ACNC: 3.41 UIU/ML (ref 0.27–4.2)
UROBILINOGEN, URINE: 0.2 E.U./DL
VITAMIN D 25-HYDROXY: 40 NG/ML (ref 30–100)
VLDLC SERPL CALC-MCNC: 20 MG/DL
WBC # BLD: 8.2 E9/L (ref 4.5–11.5)
WBC UA: NORMAL /HPF (ref 0–5)

## 2021-12-11 ENCOUNTER — TELEPHONE (OUTPATIENT)
Dept: PRIMARY CARE CLINIC | Age: 76
End: 2021-12-11

## 2021-12-11 NOTE — LETTER
[facility]  [address]  [place] [address]  Phone: [phone]  Fax: [phone]    Josh Waldron MD        December 14, 2021     [address] [place] [address]      Dear Shaye Howard:    Below are the results from your recent visit:    Resulted Orders   Microalbumin, Ur   Result Value Ref Range    Microalbumin, Random Urine 19.3 (H) Not Established mg/L   TSH without Reflex   Result Value Ref Range    TSH 3.410 0.270 - 4.200 uIU/mL   Urinalysis   Result Value Ref Range    Color, UA Yellow Straw/Yellow    Clarity, UA Clear Clear    Glucose, Ur Negative Negative mg/dL    Bilirubin Urine Negative Negative    Ketones, Urine Negative Negative mg/dL    Specific Gravity, UA 1.010 1.005 - 1.030    Blood, Urine Negative Negative    pH, UA 6.0 5.0 - 9.0    Protein, UA Negative Negative mg/dL    Urobilinogen, Urine 0.2 <2.0 E.U./dL    Nitrite, Urine Negative Negative    Leukocyte Esterase, Urine TRACE (A) Negative   Vitamin D 25 Hydroxy   Result Value Ref Range    Vit D, 25-Hydroxy 40 30 - 100 ng/mL      Comment:      <20 ng/mL. ........... Pinky Angles Deficient  20-30 ng/mL. ......... Pinky Angles Insufficient   ng/mL. ........ Pinky Angles Sufficient  >100 ng/mL. .......... Pinky Angles Toxic     Magnesium   Result Value Ref Range    Magnesium 1.8 1.6 - 2.6 mg/dL   Lipid, Fasting   Result Value Ref Range    Cholesterol, Fasting 179 0 - 199 mg/dL    Triglyceride, Fasting 98 0 - 149 mg/dL    HDL 55 >40 mg/dL    LDL Calculated 104 (H) 0 - 99 mg/dL    VLDL Cholesterol Calculated 20 mg/dL   Hemoglobin A1C   Result Value Ref Range    Hemoglobin A1C 9.1 (H) 4.0 - 5.6 %   Comprehensive Metabolic Panel   Result Value Ref Range    Sodium 142 132 - 146 mmol/L    Potassium 4.1 3.5 - 5.0 mmol/L    Chloride 103 98 - 107 mmol/L    CO2 26 22 - 29 mmol/L    Anion Gap 13 7 - 16 mmol/L    Glucose 122 (H) 74 - 99 mg/dL    BUN 30 (H) 6 - 23 mg/dL    CREATININE 1.2 (H) 0.5 - 1.0 mg/dL    GFR Non-African American 44 >=60 mL/min/1.73      Comment:      Chronic Kidney Disease: less than 60 ml/min/1.73 sq.m. Kidney Failure: less than 15 ml/min/1.73 sq.m. Results valid for patients 18 years and older. GFR  53     Calcium 10.0 8.6 - 10.2 mg/dL    Total Protein 8.0 6.4 - 8.3 g/dL    Albumin 3.9 3.5 - 5.2 g/dL    Total Bilirubin 0.4 0.0 - 1.2 mg/dL    Alkaline Phosphatase 159 (H) 35 - 104 U/L    ALT 18 0 - 32 U/L    AST 22 0 - 31 U/L   CBC Auto Differential   Result Value Ref Range    WBC 8.2 4.5 - 11.5 E9/L    RBC 4.78 3.50 - 5.50 E12/L    Hemoglobin 13.7 11.5 - 15.5 g/dL    Hematocrit 42.8 34.0 - 48.0 %    MCV 89.5 80.0 - 99.9 fL    MCH 28.7 26.0 - 35.0 pg    MCHC 32.0 32.0 - 34.5 %    RDW 14.0 11.5 - 15.0 fL    Platelets 586 156 - 787 E9/L    MPV 11.1 7.0 - 12.0 fL    Neutrophils % 62.8 43.0 - 80.0 %    Immature Granulocytes % 0.4 0.0 - 5.0 %    Lymphocytes % 24.8 20.0 - 42.0 %    Monocytes % 5.9 2.0 - 12.0 %    Eosinophils % 5.5 0.0 - 6.0 %    Basophils % 0.6 0.0 - 2.0 %    Neutrophils Absolute 5.14 1.80 - 7.30 E9/L    Immature Granulocytes # 0.03 E9/L    Lymphocytes Absolute 2.03 1.50 - 4.00 E9/L    Monocytes Absolute 0.48 0.10 - 0.95 E9/L    Eosinophils Absolute 0.45 0.05 - 0.50 E9/L    Basophils Absolute 0.05 0.00 - 0.20 E9/L   Microscopic Urinalysis   Result Value Ref Range    WBC, UA 1-3 0 - 5 /HPF    RBC, UA NONE 0 - 2 /HPF    Bacteria, UA NONE SEEN None Seen /HPF     The test results show:       Sugar was elevated, hemoglobin A1c is a measure 3-month sugar control was still elevated from goal but improved to 9.1. Would need to consider further adjustment of medications to try to gain better control of sugar     Cholesterol levels were borderline elevated. Recommend to continue present medications may consider dose increase     Labs showed slight kidney dysfunction. Uncertain as to cause could be related to diabetes and/or medication and/or dehydration.   Recommending to increase fluids and follow closely     Urine analysis showed slight microscopic protein and white cells but otherwise was normal     Blood counts were normal     Thyroid was normal     Vitamin D was normal     Electrolytes and liver functions were normal      If you have any questions or concerns, please don't hesitate to call.     Sincerely,        Sharron Brunson MD

## 2021-12-11 NOTE — TELEPHONE ENCOUNTER
Please let the patient know that blood work results showed    Sugar was elevated, hemoglobin A1c is a measure 3-month sugar control was still elevated from goal but improved to 9.1. Would need to consider further adjustment of medications to try to gain better control of sugar    Cholesterol levels were borderline elevated. Recommend to continue present medications may consider dose increase    Labs showed slight kidney dysfunction. Uncertain as to cause could be related to diabetes and/or medication and/or dehydration.   Recommending to increase fluids and follow closely    Urine analysis showed slight microscopic protein and white cells but otherwise was normal    Blood counts were normal    Thyroid was normal    Vitamin D was normal    Electrolytes and liver functions were normal    Please send a copy reports to the patient    Thanks

## 2021-12-14 ENCOUNTER — OFFICE VISIT (OUTPATIENT)
Dept: ENDOCRINOLOGY | Age: 76
End: 2021-12-14
Payer: MEDICARE

## 2021-12-14 VITALS
SYSTOLIC BLOOD PRESSURE: 186 MMHG | DIASTOLIC BLOOD PRESSURE: 72 MMHG | HEART RATE: 94 BPM | HEIGHT: 62 IN | OXYGEN SATURATION: 96 % | BODY MASS INDEX: 37.17 KG/M2 | WEIGHT: 202 LBS

## 2021-12-14 DIAGNOSIS — E55.9 VITAMIN D DEFICIENCY: ICD-10-CM

## 2021-12-14 DIAGNOSIS — E11.9 TYPE 2 DIABETES MELLITUS WITHOUT COMPLICATION, WITH LONG-TERM CURRENT USE OF INSULIN (HCC): Primary | ICD-10-CM

## 2021-12-14 DIAGNOSIS — Z79.4 TYPE 2 DIABETES MELLITUS WITHOUT COMPLICATION, WITH LONG-TERM CURRENT USE OF INSULIN (HCC): Primary | ICD-10-CM

## 2021-12-14 DIAGNOSIS — E78.2 MIXED HYPERLIPIDEMIA: ICD-10-CM

## 2021-12-14 PROCEDURE — 99214 OFFICE O/P EST MOD 30 MIN: CPT | Performed by: INTERNAL MEDICINE

## 2021-12-14 RX ORDER — INSULIN GLARGINE 100 [IU]/ML
40 INJECTION, SOLUTION SUBCUTANEOUS NIGHTLY
Qty: 25 PEN | Refills: 0 | Status: SHIPPED | OUTPATIENT
Start: 2021-12-14 | End: 2021-12-14

## 2021-12-14 RX ORDER — FLASH GLUCOSE SENSOR
KIT MISCELLANEOUS
Qty: 6 EACH | Refills: 3 | Status: SHIPPED | OUTPATIENT
Start: 2021-12-14 | End: 2022-03-18 | Stop reason: ALTCHOICE

## 2021-12-14 RX ORDER — FLASH GLUCOSE SCANNING READER
1 EACH MISCELLANEOUS ONCE
Qty: 1 EACH | Refills: 0 | Status: SHIPPED | OUTPATIENT
Start: 2021-12-14 | End: 2021-12-14

## 2021-12-14 NOTE — PROGRESS NOTES
[address] [place] [address] Department of Endocrinology Diabetes and Metabolism   [address] [facility] [address]   Phone: [phone]  Fax: [phone]    Date of Service: 12/14/2021  Primary Care Physician: Lissette Wheeler MD  Provider: Silvoi Mendez MD    Reason for the visit:  DM type 2     History of Present Illness: The history is provided by the patient. No  was used. Accuracy of the patient data is excellent. Lalito Acosta is a very pleasant 76 y.o. female seen today for diabetes management     Lalito Acosta was diagnosed with diabetes in 1995 and currently on Metformin 500 mg BID, Novolin 70/30 30 units in AM and 20 units in PM,  Novolog with meals -  ss 1 unit :30 carbs >140 was ordered however, she has not been taking Novolog before meals. She holds her 70/30  If BS is < 100. She trialed the Boogie in April an 2 days later it fell off. The patient has been checking blood sugar 2-3  times a day and readings are variable   She remains active during yard work daily.    Most recent A1c results summarized below  Lab Results   Component Value Date    LABA1C 9.1 12/10/2021    LABA1C 11.4 03/24/2021    LABA1C 9.3 09/04/2020     Patient has had no hypoglycemic episodes   The patient has been mindful of what has been eating and is following a diabetes diet    I reviewed current medications and the patient has no issues with diabetes medications  Lalito Acosta is up to date with eye exam and denied any history of diabetic retinopathy   The patient is due for an eye exam. Last eye exam was 11/2021, no h/o diabetic retinopathy  The patient performs own feet care  Microvascular complications:  No Retinopathy, Nephropathy or Neuropathy   Macrovascular complications: no CAD, PVD, or Stroke    PAST MEDICAL HISTORY   Past Medical History:   Diagnosis Date    Bilateral nonexudative age-related macular degeneration 5/9/2019    HLD (hyperlipidemia)     HTN (hypertension)     Stage 2 chronic kidney disease 2019    [facility]     Uterine cancer ([facility]     s/p surgery    Vitamin D deficiency 2019       PAST SURGICAL HISTORY   Past Surgical History:   Procedure Laterality Date    HYSTERECTOMY, TOTAL ABDOMINAL      TONSILLECTOMY         SOCIAL HISTORY   Tobacco:   reports that she has never smoked. She has never used smokeless tobacco.  Alcohol:   reports no history of alcohol use. Drugs:   reports no history of drug use.     FAMILY HISTORY   Family History   Problem Relation Age of Onset    Heart Disease Mother         late onset,  in 80's    Coronary Art Dis Mother     Heart Disease Father         late onset,  in 80's    Dementia Father     Coronary Art Dis Father     No Known Problems Son     No Known Problems Son     No Known Problems Son        ALLERGIES AND DRUG REACTIONS   Allergies   Allergen Reactions    Penicillins Hives and Other (See Comments)     EDEMA EXTREMITIES       CURRENT MEDICATIONS   Current Outpatient Medications   Medication Sig Dispense Refill    insulin glargine (BASAGLAR KWIKPEN) 100 UNIT/ML injection pen Inject 40 Units into the skin nightly 25 pen 0    Continuous Blood Gluc Sensor (FREESTYLE FRANNY 2 SENSOR) MISC Change sensor every 14 days 6 each 3    Continuous Blood Gluc  (FREESTYLE FRANNY 2 READER) ALEXIS 1 Device by Does not apply route once for 1 dose 1 each 0    Continuous Blood Gluc Sensor (FREESTYLE FRANNY 2 SENSOR) MISC Apply to upper outer arm every 2 weeks 7 each 3    insulin aspart (NOVOLOG) 100 UNIT/ML injection vial Take for correction of B unit for every 30 mg/dl the BG is >140 mg/dl (max of 15 units/day) 1 vial 3    atorvastatin (LIPITOR) 40 MG tablet Take 1 tablet by mouth daily 90 tablet 0    metoprolol succinate (TOPROL XL) 50 MG extended release tablet Take 1 tablet by mouth daily 90 tablet 0    lisinopril-hydroCHLOROthiazide (PRINZIDE;ZESTORETIC) 20-12.5 MG per tablet Take 1 tablet by mouth 2 times daily 180 tablet 1    hydrALAZINE (APRESOLINE) 10 MG tablet Take 1 tablet by mouth 2 times daily 60 tablet 2    Insulin Syringe-Needle U-100 (BD INSULIN SYRINGE U/F) 31G X 5/16\" 0.5 ML MISC USE AS DIRECTED TO TAKE INSULIN 3 TIMES A  each 3    Continuous Blood Gluc  (FREESTYLE FRANNY 2 READER) ALEXIS Use to swipe the sensor at least every 6 hours 1 each 0    blood glucose test strips (ONETOUCH VERIO) strip Use to test blood sugar 4 times a day. 200 each 5    Lancets MISC Use to check BG 1-2x/day 200 each 3    Multiple Vitamins-Minerals (PRESERVISION AREDS 2 PO) Take by mouth 2 times daily      vitamin D (CHOLECALCIFEROL) 1000 UNIT TABS tablet Take 1,000 Units by mouth daily       No current facility-administered medications for this visit. Review of Systems  Constitutional: No fever, no chills, no diaphoresis, no generalized weakness. HEENT: No blurred vision, No sore throat, no ear pain, no hair loss  Neck: denied any neck swelling, difficulty swallowing,   Cardio-pulmonary: No CP, SOB or palpitation, No orthopnea or PND. No cough or wheezing. GI: No N/V/D, no constipation, No abdominal pain, no melena or hematochezia   : Denied any dysuria, hematuria, flank pain, discharge, or incontinence. Skin: denied any rash, ulcer, Hirsute, or hyperpigmentation. MSK: denied any joint deformity, joint pain/swelling, muscle pain, or back pain.   Neuro: no numbness, no tingling, no weakness    OBJECTIVE    BP (!) 186/72   Pulse 94   Ht 5' 2\" (1.575 m)   Wt 202 lb (91.6 kg)   SpO2 96%   BMI 36.95 kg/m²   BP Readings from Last 4 Encounters:   12/14/21 (!) 186/72   04/05/21 (!) 162/86   04/02/21 130/78   02/06/20 134/88     Wt Readings from Last 6 Encounters:   12/14/21 202 lb (91.6 kg)   04/05/21 210 lb 12.8 oz (95.6 kg)   04/02/21 208 lb 9.6 oz (94.6 kg)   09/08/20 210 lb 6.4 oz (95.4 kg)   02/06/20 215 lb 12.8 oz (97.9 kg)   11/13/19 214 lb 3.2 oz (97.2 kg)       Physical examination:  General: awake alert, oriented x3, no abnormal position or movements. HEENT: normocephalic non-traumatic, no exophthalmos   Neck: supple, no LN enlargement, no JVD. Pulm: Clear equal air entry no added sounds, no wheezing or rhonchi    CVS: S1 + S2, no murmur, no heave. Abd: soft lax, no tenderness, no organomegaly, audible bowel sounds. Skin: warm, no lesions, no rash.  No callus, no Ulcers, No acanthosis nigricans  Musculoskeletal: No back tenderness, no kyphosis/scoliosis    Neuro: CN intact , muscle power normal  Psych: normal mood, and affect    Review of Laboratory Data:  I personally reviewed the following lab:  Lab Results   Component Value Date/Time    WBC 8.2 12/10/2021 09:20 AM    RBC 4.78 12/10/2021 09:20 AM    HGB 13.7 12/10/2021 09:20 AM    HCT 42.8 12/10/2021 09:20 AM    MCV 89.5 12/10/2021 09:20 AM    MCH 28.7 12/10/2021 09:20 AM    MCHC 32.0 12/10/2021 09:20 AM    RDW 14.0 12/10/2021 09:20 AM     12/10/2021 09:20 AM    MPV 11.1 12/10/2021 09:20 AM      Lab Results   Component Value Date/Time     12/10/2021 09:20 AM    K 4.1 12/10/2021 09:20 AM    CO2 26 12/10/2021 09:20 AM    BUN 30 (H) 12/10/2021 09:20 AM    CREATININE 1.2 (H) 12/10/2021 09:20 AM    CALCIUM 10.0 12/10/2021 09:20 AM    LABGLOM 44 12/10/2021 09:20 AM    GFRAA 53 12/10/2021 09:20 AM      Lab Results   Component Value Date/Time    TSH 3.410 12/10/2021 09:20 AM     Lab Results   Component Value Date    LABA1C 9.1 12/10/2021    GLUCOSE 122 12/10/2021    MALBCR 29.6 09/04/2020    LABMICR 19.3 12/10/2021    LABCREA 48 09/04/2020     Lab Results   Component Value Date    LABA1C 9.1 12/10/2021    LABA1C 11.4 03/24/2021    LABA1C 9.3 09/04/2020     Lab Results   Component Value Date    HDL 55 12/10/2021    LDLCALC 104 12/10/2021     Lab Results   Component Value Date    VITD25 40 12/10/2021    VITD25 40 03/24/2021       ASSESSMENT & RECOMMENDATIONS   susanne [age] y. o.-old female seen in for the following issues     Diabetes Mellitus Type  2   · Patient's diabetes is uncontrol   · Currently on Metformin 500 mg BID, Novolin 70/30 30 units in AM and 20 units in PM  · Pt will use freestyle Galina cgm and send us sugar log in a week to help adjusting her regimen   · Discussed with patient A1c and blood sugar goals   · Patient will need routine diabetes maintenance and prevention  · Diabetes labs before next visit     Dietary noncompliance   Discussed with patient the importance of eating consistent carbohydrate meals, avoiding high glycemic index food. Also, discussed with patient the risk and negative consequences of dietary noncompliance on blood glucose control, blood pressure and weight    vitD deficiency   · Continue vitD supplement     HLD  · Continue Lipitor 40 mg daily     I personally reviewed external notes from PCP and other patient's care team providers, and personally interpreted labs associated with the above diagnosis. I also ordered labs to further assess and manage the above addressed medical conditions. Return in about 3 months (around 3/14/2022) for DM type 2, VitD deficiency. The above issues were reviewed with the patient who understood and agreed with the plan. Thank you for allowing us to participate in the care of this patient. Please do not hesitate to contact us with any additional questions. Amos Wilhelm MD   Endocrinologist, [facility]   [address] [place] [address]   Phone: [phone]  Fax: [phone]  --------------------------------------------  An electronic signature was used to authenticate this note.  Amos Wilhelm MD   on 12/14/2021 at 12:39 PM no

## 2022-01-18 DIAGNOSIS — I10 BENIGN ESSENTIAL HYPERTENSION: ICD-10-CM

## 2022-01-18 RX ORDER — HYDRALAZINE HYDROCHLORIDE 10 MG/1
10 TABLET, FILM COATED ORAL 2 TIMES DAILY
Qty: 60 TABLET | Refills: 2 | Status: SHIPPED
Start: 2022-01-18 | End: 2022-03-18 | Stop reason: SDUPTHER

## 2022-01-24 DIAGNOSIS — E11.9 TYPE 2 DIABETES MELLITUS WITHOUT COMPLICATION, WITH LONG-TERM CURRENT USE OF INSULIN (HCC): ICD-10-CM

## 2022-01-24 DIAGNOSIS — Z79.4 TYPE 2 DIABETES MELLITUS WITHOUT COMPLICATION, WITH LONG-TERM CURRENT USE OF INSULIN (HCC): ICD-10-CM

## 2022-01-24 RX ORDER — BLOOD SUGAR DIAGNOSTIC
STRIP MISCELLANEOUS
Qty: 200 EACH | Refills: 5 | Status: SHIPPED | OUTPATIENT
Start: 2022-01-24

## 2022-02-01 DIAGNOSIS — I10 BENIGN ESSENTIAL HYPERTENSION: ICD-10-CM

## 2022-02-01 RX ORDER — METOPROLOL SUCCINATE 50 MG/1
50 TABLET, EXTENDED RELEASE ORAL DAILY
Qty: 90 TABLET | Refills: 0 | Status: SHIPPED
Start: 2022-02-01 | End: 2022-03-18 | Stop reason: SDUPTHER

## 2022-03-11 DIAGNOSIS — E78.2 MIXED HYPERLIPIDEMIA: ICD-10-CM

## 2022-03-11 RX ORDER — ATORVASTATIN CALCIUM 40 MG/1
40 TABLET, FILM COATED ORAL DAILY
Qty: 90 TABLET | Refills: 0 | Status: SHIPPED
Start: 2022-03-11 | End: 2022-03-18 | Stop reason: SDUPTHER

## 2022-03-11 NOTE — TELEPHONE ENCOUNTER
90 day refill request     Last Appointment:  4/2/2021    Future appts:  Future Appointments   Date Time Provider Corey Gray   3/15/2022  9:30 AM MARKEL Busby - CNS [facility]   3/18/2022  1:15 PM MD LOUIS Garces [facility]

## 2022-03-15 ENCOUNTER — OFFICE VISIT (OUTPATIENT)
Dept: ENDOCRINOLOGY | Age: 77
End: 2022-03-15
Payer: MEDICARE

## 2022-03-15 VITALS
HEIGHT: 62 IN | DIASTOLIC BLOOD PRESSURE: 83 MMHG | SYSTOLIC BLOOD PRESSURE: 181 MMHG | HEART RATE: 96 BPM | WEIGHT: 197 LBS | BODY MASS INDEX: 36.25 KG/M2

## 2022-03-15 DIAGNOSIS — I10 ESSENTIAL HYPERTENSION: ICD-10-CM

## 2022-03-15 DIAGNOSIS — E55.9 VITAMIN D DEFICIENCY: ICD-10-CM

## 2022-03-15 DIAGNOSIS — E11.65 UNCONTROLLED TYPE 2 DIABETES MELLITUS WITH HYPERGLYCEMIA (HCC): Primary | ICD-10-CM

## 2022-03-15 DIAGNOSIS — E78.2 MIXED HYPERLIPIDEMIA: ICD-10-CM

## 2022-03-15 LAB — HBA1C MFR BLD: 8.1 %

## 2022-03-15 PROCEDURE — 83036 HEMOGLOBIN GLYCOSYLATED A1C: CPT | Performed by: CLINICAL NURSE SPECIALIST

## 2022-03-15 PROCEDURE — 3052F HG A1C>EQUAL 8.0%<EQUAL 9.0%: CPT | Performed by: CLINICAL NURSE SPECIALIST

## 2022-03-15 PROCEDURE — 99214 OFFICE O/P EST MOD 30 MIN: CPT | Performed by: CLINICAL NURSE SPECIALIST

## 2022-03-15 NOTE — PROGRESS NOTES
[address] [facility] Department of Endocrinology Diabetes and Metabolism   [address] [facility], [place] [address]   Phone: [phone]  Fax: [phone]    Date of Service: 3/15/2022    Primary Care Physician: Mindi Rosado MD  Referring physician: No ref. provider found  Provider: MARKEL Carcamo     Reason for the visit:      History of Present Illness: The history is provided by the patient. No  was used. Accuracy of the patient data is excellent. Leann Araujo is a very pleasant 68 y.o. female seen today for diabetes management     Leann Araujo was diagnosed with diabetes in 1995 and currently on Metformin 500 mg BID, Novolin 70/30 30 units in AM and 20 units in PM,  Novolog with meals -  ss 1 unit :30 carbs >140 was ordered however, she has not been taking Novolog before meals.  She holds her 70/30  If BS is < 100.           The patient has been checking blood sugar 2-3  times a day and readings are mostly at goal  She remains active around house   Most recent A1c results summarized below  Lab Results   Component Value Date    LABA1C 8.1 03/15/2022    LABA1C 9.1 12/10/2021    LABA1C 11.4 03/24/2021       Patient has had infrequent  hypoglycemic episodes   The patient has been mindful of what has been eating and is following a diabetes diet    I reviewed current medications and the patient has no issues with diabetes medications  Leann Araujo is up to date with eye exam and denied any history of diabetic retinopathy   Last eye exam was 11/2021, no h/o diabetic retinopathy  The patient performs own feet care  Microvascular complications:  No Retinopathy, stage 3 CKD  or Neuropathy   Macrovascular complications: no CAD, PVD, or Stroke  PAST MEDICAL HISTORY   Past Medical History:   Diagnosis Date    Bilateral nonexudative age-related macular degeneration 5/9/2019    HLD (hyperlipidemia)     HTN (hypertension)     Stage 2 chronic kidney disease 2019    [facility] (type [facility] mellitus) [facility] [address]     Uterine cancer ([facility] [address] [facility]     s/p surgery    Vitamin D deficiency 2019       PAST SURGICAL HISTORY   Past Surgical History:   Procedure Laterality Date    HYSTERECTOMY, TOTAL ABDOMINAL      TONSILLECTOMY         SOCIAL HISTORY   Tobacco:   reports that she has never smoked. She has never used smokeless tobacco.  Alcohol:   reports no history of alcohol use. Drugs:   reports no history of drug use. FAMILY HISTORY   Family History   Problem Relation Age of Onset    Heart Disease Mother         late onset,  in 80's    Coronary Art Dis Mother     Heart Disease Father         late onset,  in 80's    Dementia Father     Coronary Art Dis Father     No Known Problems Son     No Known Problems Son     No Known Problems Son        ALLERGIES AND DRUG REACTIONS   Allergies   Allergen Reactions    Penicillins Hives and Other (See Comments)     EDEMA EXTREMITIES       CURRENT MEDICATIONS   Current Outpatient Medications   Medication Sig Dispense Refill    atorvastatin (LIPITOR) 40 MG tablet Take 1 tablet by mouth daily 90 tablet 0    metoprolol succinate (TOPROL XL) 50 MG extended release tablet Take 1 tablet by mouth daily 90 tablet 0    blood glucose test strips (ONETOUCH VERIO) strip Use to test blood sugar 4 times a day.  200 each 5    hydrALAZINE (APRESOLINE) 10 MG tablet Take 1 tablet by mouth 2 times daily 60 tablet 2    Continuous Blood Gluc Sensor (FREESTYLE FRANNY 2 SENSOR) MISC Change sensor every 14 days (Patient not taking: Reported on 2021) 6 each 3    lisinopril-hydroCHLOROthiazide (PRINZIDE;ZESTORETIC) 20-12.5 MG per tablet Take 1 tablet by mouth 2 times daily 180 tablet 1    Insulin Syringe-Needle U-100 (BD INSULIN SYRINGE U/F) 31G X 5/16\" 0.5 ML MISC USE AS DIRECTED TO TAKE INSULIN 3 TIMES A  each 3    Continuous Blood Gluc  (FREESTYLE FRANNY 2 READER) ALEXIS Use to swipe the sensor at least every 6 hours (Patient not taking: Reported on 12/14/2021) 1 each 0    Continuous Blood Gluc Sensor (FREESTYLE FRANNY 2 SENSOR) MISC Apply to upper outer arm every 2 weeks (Patient not taking: Reported on 12/14/2021) 7 each 3    Lancets MISC Use to check BG 1-2x/day 200 each 3    Multiple Vitamins-Minerals (PRESERVISION AREDS 2 PO) Take by mouth 2 times daily      vitamin D (CHOLECALCIFEROL) 1000 UNIT TABS tablet Take 1,000 Units by mouth daily       No current facility-administered medications for this visit. Review of Systems  Constitutional: No fever, no chills, no diaphoresis, no generalized weakness. HEENT: No blurred vision, No sore throat, no ear pain, no hair loss  Neck: denied any neck swelling, difficulty swallowing,   Cardio-pulmonary: No CP, SOB or palpitation, No orthopnea or PND. No cough or wheezing. GI: No N/V/D, no constipation, No abdominal pain, no melena or hematochezia   : Denied any dysuria, hematuria, flank pain, discharge, or incontinence. Skin: denied any rash, ulcer, Hirsute, or hyperpigmentation. MSK: denied any joint deformity, joint pain/swelling, muscle pain, or back pain. Neuro: no numbness, no tingling, no weakness, _    OBJECTIVE    BP (!) 181/83   Pulse 96   Ht 5' 2\" (1.575 m)   Wt 197 lb (89.4 kg)   BMI 36.03 kg/m²   BP Readings from Last 4 Encounters:   03/15/22 (!) 181/83   12/14/21 (!) 186/72   04/05/21 (!) 162/86   04/02/21 130/78     Wt Readings from Last 6 Encounters:   03/15/22 197 lb (89.4 kg)   12/14/21 202 lb (91.6 kg)   04/05/21 210 lb 12.8 oz (95.6 kg)   04/02/21 208 lb 9.6 oz (94.6 kg)   09/08/20 210 lb 6.4 oz (95.4 kg)   02/06/20 215 lb 12.8 oz (97.9 kg)       Physical examination:  General: awake alert, oriented x3, no abnormal position or movements. HEENT: normocephalic non-traumatic, no exophthalmos   Neck: supple, no LN enlargement, no thyromegaly, no thyroid tenderness, no JVD.   Pulm: Clear equal air entry no added sounds, no wheezing or rhonchi    CVS: S1 + S2, no murmur, no heave. Dorsalis pedis pulse palpable   Abd: soft lax, no tenderness, no organomegaly, audible bowel sounds. Skin: warm, no lesions, no rash. No callus, no Ulcers, No acanthosis nigricans  Musculoskeletal: No back tenderness, no kyphosis/scoliosis    Neuro: CN intact, Monofilament sensation decreased bilateral , muscle power normal  Psych: normal mood, and affect      Review of Laboratory Data:  I personally reviewed the following lab:  Lab Results   Component Value Date/Time    WBC 8.2 12/10/2021 09:20 AM    RBC 4.78 12/10/2021 09:20 AM    HGB 13.7 12/10/2021 09:20 AM    HCT 42.8 12/10/2021 09:20 AM    MCV 89.5 12/10/2021 09:20 AM    MCH 28.7 12/10/2021 09:20 AM    MCHC 32.0 12/10/2021 09:20 AM    RDW 14.0 12/10/2021 09:20 AM     12/10/2021 09:20 AM    MPV 11.1 12/10/2021 09:20 AM      Lab Results   Component Value Date/Time     12/10/2021 09:20 AM    K 4.1 12/10/2021 09:20 AM    CO2 26 12/10/2021 09:20 AM    BUN 30 (H) 12/10/2021 09:20 AM    CREATININE 1.2 (H) 12/10/2021 09:20 AM    CALCIUM 10.0 12/10/2021 09:20 AM    LABGLOM 44 12/10/2021 09:20 AM    GFRAA 53 12/10/2021 09:20 AM      Lab Results   Component Value Date/Time    TSH 3.410 12/10/2021 09:20 AM     Lab Results   Component Value Date    LABA1C 8.1 03/15/2022    GLUCOSE 122 12/10/2021    MALBCR 29.6 09/04/2020    LABMICR 19.3 12/10/2021    LABCREA 48 09/04/2020     Lab Results   Component Value Date    LABA1C 8.1 03/15/2022    LABA1C 9.1 12/10/2021    LABA1C 11.4 03/24/2021     Lab Results   Component Value Date    HDL 55 12/10/2021    LDLCALC 104 12/10/2021     Lab Results   Component Value Date    VITD25 40 12/10/2021    VITD25 40 03/24/2021       ASSESSMENT & RECOMMENDATIONS   Karishma Messer, a 68 y.o.-old female seen in for the following issues       Assessment:      Diagnosis Orders   1. Uncontrolled type 2 diabetes mellitus with hyperglycemia (HCC)  POCT glycosylated hemoglobin (Hb A1C)   2.  Mixed hyperlipidemia     3. Vitamin D deficiency     4. Essential hypertension         Plan:     1. Uncontrolled type 2 diabetes mellitus with hyperglycemia ([address]   Patient diabetes usually stable per recent blood glucose log. Hemoglobin A1c 8.1%  Very infrequent hypoglycemia  Plan: Continue metformin 500 mg 1 tablet twice daily  Continue Novolin 70/30 30 units with breakfast and 20 units before dinner  Advised to check blood sugars 2-3 times per day  Advised to call if hypoglycemia becomes more frequent  Encourage diet and exercise  Patient up-to-date with ophthalmology exam   2. Mixed hyperlipidemia   Continue statin therapy. Counseled on the importance of statin therapy with diabetes   3. Vitamin D deficiency   Continue vitamin D supplementation. Last vitamin D within normal limits   4. Essential hypertension   BP goal less than 140/90. Advise follow-up with PCP         I personally spent greater than 30 minutes reviewing  external notes from PCP and other patient's care team providers, and personally interpreted labs associated with the above diagnosis. Return in about 3 months (around 6/15/2022). The above issues were reviewed with the patient who understood and agreed with the plan. Thank you for allowing us to participate in the care of this patient. Please do not hesitate to contact us with any additional questions. MARKEL Porter     [facility] Diabetes Care and Endocrinology   [address] [facility] [address]   Phone: [phone]  Fax: [phone]  --------------------------------------------  An electronic signature was used to authenticate this note.  Gretel JOINER on 3/15/2022 at 9:57 AM

## 2022-03-18 ENCOUNTER — OFFICE VISIT (OUTPATIENT)
Dept: PRIMARY CARE CLINIC | Age: 77
End: 2022-03-18
Payer: MEDICARE

## 2022-03-18 VITALS
HEART RATE: 102 BPM | BODY MASS INDEX: 36.25 KG/M2 | OXYGEN SATURATION: 96 % | TEMPERATURE: 97.5 F | DIASTOLIC BLOOD PRESSURE: 78 MMHG | WEIGHT: 198.2 LBS | SYSTOLIC BLOOD PRESSURE: 136 MMHG

## 2022-03-18 DIAGNOSIS — Z79.4 TYPE 2 DIABETES MELLITUS WITH STAGE 2 CHRONIC KIDNEY DISEASE, WITH LONG-TERM CURRENT USE OF INSULIN (HCC): ICD-10-CM

## 2022-03-18 DIAGNOSIS — M79.605 PAIN IN BOTH LOWER EXTREMITIES: Primary | ICD-10-CM

## 2022-03-18 DIAGNOSIS — E66.01 SEVERE OBESITY (BMI 35.0-39.9) WITH COMORBIDITY (HCC): ICD-10-CM

## 2022-03-18 DIAGNOSIS — E78.2 MIXED HYPERLIPIDEMIA: ICD-10-CM

## 2022-03-18 DIAGNOSIS — R53.83 OTHER FATIGUE: ICD-10-CM

## 2022-03-18 DIAGNOSIS — Z79.4 DIABETES MELLITUS DUE TO UNDERLYING CONDITION WITH HYPERGLYCEMIA, WITH LONG-TERM CURRENT USE OF INSULIN (HCC): ICD-10-CM

## 2022-03-18 DIAGNOSIS — E55.9 VITAMIN D DEFICIENCY: ICD-10-CM

## 2022-03-18 DIAGNOSIS — Z79.899 LONG TERM CURRENT USE OF THERAPEUTIC DRUG: ICD-10-CM

## 2022-03-18 DIAGNOSIS — M79.604 PAIN IN BOTH LOWER EXTREMITIES: Primary | ICD-10-CM

## 2022-03-18 DIAGNOSIS — I10 BENIGN ESSENTIAL HYPERTENSION: ICD-10-CM

## 2022-03-18 DIAGNOSIS — E08.65 DIABETES MELLITUS DUE TO UNDERLYING CONDITION WITH HYPERGLYCEMIA, WITH LONG-TERM CURRENT USE OF INSULIN (HCC): ICD-10-CM

## 2022-03-18 DIAGNOSIS — E11.22 TYPE 2 DIABETES MELLITUS WITH STAGE 2 CHRONIC KIDNEY DISEASE, WITH LONG-TERM CURRENT USE OF INSULIN (HCC): ICD-10-CM

## 2022-03-18 DIAGNOSIS — N18.2 TYPE 2 DIABETES MELLITUS WITH STAGE 2 CHRONIC KIDNEY DISEASE, WITH LONG-TERM CURRENT USE OF INSULIN (HCC): ICD-10-CM

## 2022-03-18 DIAGNOSIS — N85.02 ENDOMETRIAL INTRAEPITHELIAL NEOPLASIA (EIN): ICD-10-CM

## 2022-03-18 DIAGNOSIS — N18.2 STAGE 2 CHRONIC KIDNEY DISEASE: ICD-10-CM

## 2022-03-18 PROCEDURE — 3052F HG A1C>EQUAL 8.0%<EQUAL 9.0%: CPT | Performed by: INTERNAL MEDICINE

## 2022-03-18 PROCEDURE — 99214 OFFICE O/P EST MOD 30 MIN: CPT | Performed by: INTERNAL MEDICINE

## 2022-03-18 RX ORDER — ATORVASTATIN CALCIUM 40 MG/1
40 TABLET, FILM COATED ORAL DAILY
Qty: 90 TABLET | Refills: 0 | Status: SHIPPED | OUTPATIENT
Start: 2022-03-18 | End: 2022-07-01 | Stop reason: SDUPTHER

## 2022-03-18 RX ORDER — METOPROLOL SUCCINATE 50 MG/1
50 TABLET, EXTENDED RELEASE ORAL DAILY
Qty: 90 TABLET | Refills: 0 | Status: SHIPPED | OUTPATIENT
Start: 2022-03-18 | End: 2022-07-01 | Stop reason: SDUPTHER

## 2022-03-18 RX ORDER — HYDRALAZINE HYDROCHLORIDE 10 MG/1
10 TABLET, FILM COATED ORAL 2 TIMES DAILY
Qty: 60 TABLET | Refills: 2 | Status: SHIPPED | OUTPATIENT
Start: 2022-03-18 | End: 2022-06-14 | Stop reason: ALTCHOICE

## 2022-03-18 RX ORDER — LISINOPRIL AND HYDROCHLOROTHIAZIDE 20; 12.5 MG/1; MG/1
1 TABLET ORAL 2 TIMES DAILY
Qty: 180 TABLET | Refills: 1 | Status: SHIPPED | OUTPATIENT
Start: 2022-03-18 | End: 2022-11-04 | Stop reason: SDUPTHER

## 2022-03-18 SDOH — ECONOMIC STABILITY: FOOD INSECURITY: WITHIN THE PAST 12 MONTHS, THE FOOD YOU BOUGHT JUST DIDN'T LAST AND YOU DIDN'T HAVE MONEY TO GET MORE.: NEVER TRUE

## 2022-03-18 SDOH — ECONOMIC STABILITY: FOOD INSECURITY: WITHIN THE PAST 12 MONTHS, YOU WORRIED THAT YOUR FOOD WOULD RUN OUT BEFORE YOU GOT MONEY TO BUY MORE.: NEVER TRUE

## 2022-03-18 ASSESSMENT — ENCOUNTER SYMPTOMS
RHINORRHEA: 0
CHEST TIGHTNESS: 0
SHORTNESS OF BREATH: 0
ABDOMINAL PAIN: 0
CONSTIPATION: 0
BLOOD IN STOOL: 0
SORE THROAT: 0
VOMITING: 0
NAUSEA: 0
COUGH: 0
DIARRHEA: 1
EYE PAIN: 0

## 2022-03-18 ASSESSMENT — SOCIAL DETERMINANTS OF HEALTH (SDOH): HOW HARD IS IT FOR YOU TO PAY FOR THE VERY BASICS LIKE FOOD, HOUSING, MEDICAL CARE, AND HEATING?: NOT HARD AT ALL

## 2022-03-18 NOTE — PROGRESS NOTES
[facility]) Physicians   Internal Medicine     3/18/2022  Vonnie Aase : 1945 Sex: female  Age:74 y.o. Chief Complaint   Patient presents with    Diabetes    Hypertension    Hyperlipidemia        HPI:   Patient presents to office for evaluation of the following medical concerns. -  has been having aches in b/l legs , right > left. States also with swelling. States aches are worse at night. States feels weak. -  has been diagnosed with macular degeneration. States has seen retinal specialist and receiving injections. Possible Unsure if Retinopathy.    - States has DM. Following with endocrinology for Diabetes. States monitors sugars at home. States on 70/30 insulin increased to 30 units in am and 20 units in pm. Occasionally will take 15 units at night if suagr is lower. On metformin. Stopped Tradjenta. Adjusted novalog Slide scale if over 200. States has had episodes of hypoglycemia since last visit. Not always compliant with diet. Last a1c was 8.1 (3/2022)     -  has hyperlipidemia. Now on atorvastatin for high intensity statin. Trying to watch diet. No reported side effects. Frequently forgets to take at bedtime.    -  has high blood pressure. [facility] [place] does randomly check blood pressure at home. States 130/80's. On lisinopril and HCTZ and metoprolol. States has some fatigue.    - States right shoulder pain has improved.     - Labs show kidney dysfunction, last lab (2021) - still present      - lab work from 12/10/2021 reviewed with patient 3/18/2022    Health Maintenance   - immunizations:   Influenza Vaccination - (10/20/2016) , (2017), (2018), (2019), (2020)   Pneumonia Vaccination - (2018) - prevnar, (2019) - pneumococcal 23   Zoster/Shingles Vaccine  Tetanus Vaccination  covid (2021) #1, (2021) #2, (2021) #3  - pfizer     - Screenings:   Bone Density Scan - () - normal  Pelvic/Pap Exam - (), () - gyn  Mammogram - () - gyn    Colonoscopy - declines  FIT (8/2019) - negative, (4/2021) - order     Couseled on Home Safety  - (6/9/2017)    Dilated Eye Exam - (12/2017) - Wet AMD, diabetes, cataract, (5/2018) - no Retinopathy, wet AMD (11/2018)    ROS:  Review of Systems   Constitutional: Negative for appetite change, chills, fever and unexpected weight change. HENT: Negative for congestion, rhinorrhea and sore throat. Eyes: Negative for pain and visual disturbance. Respiratory: Negative for cough, chest tightness and shortness of breath. Cardiovascular: Negative for chest pain and palpitations. Gastrointestinal: Positive for diarrhea. Negative for abdominal pain, blood in stool, constipation, nausea and vomiting. Genitourinary: Negative for difficulty urinating, dysuria, frequency, pelvic pain, urgency and vaginal bleeding. Musculoskeletal: Negative for arthralgias and myalgias. Skin: Negative for rash. Neurological: Negative for dizziness, syncope, weakness, light-headedness, numbness and headaches. Hematological: Negative for adenopathy. Psychiatric/Behavioral: Negative for suicidal ideas. The patient is not nervous/anxious.           Current Outpatient Medications:     metoprolol succinate (TOPROL XL) 50 MG extended release tablet, Take 1 tablet by mouth daily, Disp: 90 tablet, Rfl: 0    lisinopril-hydroCHLOROthiazide (PRINZIDE;ZESTORETIC) 20-12.5 MG per tablet, Take 1 tablet by mouth 2 times daily, Disp: 180 tablet, Rfl: 1    hydrALAZINE (APRESOLINE) 10 MG tablet, Take 1 tablet by mouth 2 times daily, Disp: 60 tablet, Rfl: 2    atorvastatin (LIPITOR) 40 MG tablet, Take 1 tablet by mouth daily, Disp: 90 tablet, Rfl: 0    blood glucose test strips (ONETOUCH VERIO) strip, Use to test blood sugar 4 times a day., Disp: 200 each, Rfl: 5    Insulin Syringe-Needle U-100 (BD INSULIN SYRINGE U/F) 31G X 5/16\" 0.5 ML MISC, USE AS DIRECTED TO TAKE INSULIN 3 TIMES A DAY, Disp: 100 each, Rfl: 3    Continuous Blood Gluc  (FREESTYLE FRANNY 2 READER) ALEXIS, Use to swipe the sensor at least every 6 hours (Patient not taking: Reported on 2021), Disp: 1 each, Rfl: 0    Lancets MISC, Use to check BG 1-2x/day, Disp: 200 each, Rfl: 3    Multiple Vitamins-Minerals (PRESERVISION AREDS 2 PO), Take by mouth 2 times daily, Disp: , Rfl:     vitamin D (CHOLECALCIFEROL) 1000 UNIT TABS tablet, Take 1,000 Units by mouth daily, Disp: , Rfl:     Allergies   Allergen Reactions    Penicillins Hives and Other (See Comments)     EDEMA EXTREMITIES       Past Medical History:   Diagnosis Date    Bilateral nonexudative age-related macular degeneration 2019    HLD (hyperlipidemia)     HTN (hypertension)     Stage 2 chronic kidney disease 2019    T2DM (type 2 diabetes mellitus) ([facility] [address]     Uterine cancer ([facility] [address]     s/p surgery    Vitamin D deficiency 2019       Past Surgical History:   Procedure Laterality Date    HYSTERECTOMY, TOTAL ABDOMINAL      TONSILLECTOMY         Family History   Problem Relation Age of Onset    Heart Disease Mother         late onset,  in 80's   [facility] Coronary Art Dis Mother     Heart Disease Father         late onset,  in 80's    Dementia Father     Coronary Art Dis Father     No Known Problems Son     No Known Problems Son     No Known Problems Son        Social History     Socioeconomic History    Marital status:      Spouse name: Not on file    Number of children: 3    Years of education: Not on file    Highest education level: Not on file   Occupational History    Occupation:    Tobacco Use    Smoking status: Never Smoker    Smokeless tobacco: Never Used   Substance and Sexual Activity    Alcohol use: No     Comment: Social very rare    Drug use: No    Sexual activity: Never   Other Topics Concern    Not on file   Social History Narrative    Lives in a house in [place] alone     Social Determinants of Health     Financial Resource Strain: Low Risk     Difficulty of Paying Living Expenses: Not hard at all   Food Insecurity: No Food Insecurity    Worried About Running Out of Food in the Last Year: Never true    Gaetano of Food in the Last Year: Never true   Transportation Needs:     Lack of Transportation (Medical): Not on file    Lack of Transportation (Non-Medical): Not on file   Physical Activity:     Days of Exercise per Week: Not on file    Minutes of Exercise per Session: Not on file   Stress:     Feeling of Stress : Not on file   Social Connections:     Frequency of Communication with Friends and Family: Not on file    Frequency of Social Gatherings with Friends and Family: Not on file    Attends Yazidi Services: Not on file    Active Member of [address] [place] [address] or Organizations: Not on file    Attends Club or Organization Meetings: Not on file    Marital Status: Not on file   Intimate Partner Violence:     Fear of Current or Ex-Partner: Not on file    Emotionally Abused: Not on file    Physically Abused: Not on file    Sexually Abused: Not on file   Housing Stability:     Unable to Pay for Housing in the Last Year: Not on file    Number of [place] in the Last Year: Not on file    Unstable Housing in the Last Year: Not on file       Vitals:    03/18/22 1315   BP: 136/78   Site: Left Upper Arm   Position: Sitting   Pulse: 102   Temp: 97.5 °F (36.4 °C)   TempSrc: Temporal   SpO2: 96%   Weight: 198 lb 3.2 oz (89.9 kg)       Exam:  Physical Exam  Vitals reviewed. Constitutional:       Appearance: She is well-developed. HENT:      Head: Normocephalic and atraumatic. Right Ear: External ear normal. There is impacted cerumen. Left Ear: External ear normal. There is impacted cerumen. Eyes:      Pupils: Pupils are equal, round, and reactive to light. Neck:      Thyroid: No thyromegaly. Cardiovascular:      Rate and Rhythm: Normal rate and regular rhythm. Heart sounds: Normal heart sounds.  No murmur heard.      Pulmonary:      Effort: Pulmonary effort is normal.      Breath sounds: Normal breath sounds. No wheezing. Abdominal:      General: Bowel sounds are normal. There is no distension. Palpations: Abdomen is soft. Tenderness: There is no abdominal tenderness. There is no guarding or rebound. Musculoskeletal:         General: Normal range of motion. Cervical back: Normal range of motion and neck supple. Lymphadenopathy:      Cervical: No cervical adenopathy. Skin:     General: Skin is warm and dry. Neurological:      Mental Status: She is alert and oriented to person, place, and time. Cranial Nerves: No cranial nerve deficit.    Psychiatric:         Judgment: Judgment normal.         Assessment and Plan:    Diagnoses and all orders for this visit:    Pain in both lower extremities  - uncertain etiology   - also with LE edema   - check US LE   - check Nellene Grates   - recheck labs  - consider compression stockings   - call with worsening     Type 2 diabetes mellitus with stage 2 chronic kidney disease, with long-term current use of insulin ([address]  - following with endocrinology   - monitor sugar at home  - now on 70/30 insulin 30 units in am and 18 units in pm  - Humalog with meals and slide scale add to that so that any sugar > 200   - on metformin   - monitor for hypoglycemia and action plan  - Last A1c was 8.1 (3/2022)  - non compliant with diet  - established with endocrinology   - discussed to ask endocrinology about invokana for DM kidney disease risk reduction and cardiovascular risk reduction     follows with optho (2/21) wet AMD OS s/p avastin, nonexudative macular OD  on statin  on asa  On ACE     Benign essential hypertension  - monitor blood pressure at home  - watch diet   - declines amlodipine   - on metoprolol  - on lisinopril-HCTZ  - on hydralazine 10mg twice a day   - suggested nephrology - declines   - improved today 3/18/2022    Mixed hyperlipidemia  - watch diet  - on atorvastatin 40mg - high intensity statin  - follow labs   - last lab (12/2021) - improved     Localized edema  - as above     Vitamin D deficiency  - on otc supplement  - follow labs     Endometrial intraepithelial neoplasia (EIN)  -s/p surgery  - follows with gyn    Stage 2 chronic kidney disease  - Noted on labs   - possibly multifactorial (HTN, DM)  - on ACE   - may need to consider nephro referral - declines   - monitor labs - slight change (12/2021)      Return in about 3 months (around 6/18/2022) for check up and review and labs.     Orders Placed This Encounter   Procedures    US DUP LOWER EXTREMITIES BILATERAL VENOUS     Standing Status:   Future     Standing Expiration Date:   3/18/2023     Order Specific Question:   Reason for exam:     Answer:   edema and pain to LE    VL JAKE BILATERAL LIMITED 1-2 LEVELS     Standing Status:   Future     Standing Expiration Date:   3/18/2023     Order Specific Question:   Reason for exam:     Answer:   other    Comprehensive Metabolic Panel     Standing Status:   Future     Standing Expiration Date:   3/18/2023    Magnesium     Standing Status:   Future     Standing Expiration Date:   3/18/2023    Lipid, Fasting     Standing Status:   Future     Standing Expiration Date:   3/18/2023    Hemoglobin A1C     Standing Status:   Future     Standing Expiration Date:   3/18/2023    Vitamin D 25 Hydroxy     Standing Status:   Future     Standing Expiration Date:   3/18/2023    TSH     Standing Status:   Future     Standing Expiration Date:   6/18/2022    Urinalysis     Standing Status:   Future     Standing Expiration Date:   3/18/2023    Microalbumin, Ur     Standing Status:   Future     Standing Expiration Date:   3/18/2023    CBC with Auto Differential     Standing Status:   Future     Standing Expiration Date:   3/18/2023    Vitamin B12 & Folate     Standing Status:   Future     Standing Expiration Date:   3/18/2023    CK     Standing Status:   Future     Standing Expiration Date:   3/18/2023     Requested Prescriptions     Signed Prescriptions Disp Refills    metoprolol succinate (TOPROL XL) 50 MG extended release tablet 90 tablet 0     Sig: Take 1 tablet by mouth daily    lisinopril-hydroCHLOROthiazide (PRINZIDE;ZESTORETIC) 20-12.5 MG per tablet 180 tablet 1     Sig: Take 1 tablet by mouth 2 times daily    hydrALAZINE (APRESOLINE) 10 MG tablet 60 tablet 2     Sig: Take 1 tablet by mouth 2 times daily    atorvastatin (LIPITOR) 40 MG tablet 90 tablet 0     Sig: Take 1 tablet by mouth daily       Aleyda Franklin MD  3/18/2022  1:54 PM

## 2022-03-22 ENCOUNTER — TELEPHONE (OUTPATIENT)
Dept: FAMILY MEDICINE CLINIC | Age: 77
End: 2022-03-22

## 2022-03-22 DIAGNOSIS — R79.9 ABNORMAL FINDING OF BLOOD CHEMISTRY, UNSPECIFIED: ICD-10-CM

## 2022-03-22 DIAGNOSIS — R53.83 OTHER FATIGUE: Primary | ICD-10-CM

## 2022-03-22 NOTE — TELEPHONE ENCOUNTER
Please let the patient know that ultrasound of the lower extremity per radiology report was negative for blood clots    Recommend moving forward with additional arterial evaluation lower extremities    Recommend compression stockings    Please notify if symptoms are not improving or worsening    Thanks

## 2022-03-23 NOTE — TELEPHONE ENCOUNTER
Without knowing exactly what is causing the swelling    -Options could include Lasix either short-term or on a daily basis.   Already on hydrochlorothiazide which is a different type of water pill

## 2022-03-23 NOTE — TELEPHONE ENCOUNTER
Patient advised of test results. She said that her symptoms are the about the same as they have been, states they ache worse with the weather. She is asking if there is any medication that she take for this .

## 2022-03-30 RX ORDER — FUROSEMIDE 20 MG/1
20 TABLET ORAL DAILY
Qty: 30 TABLET | Refills: 2 | Status: SHIPPED
Start: 2022-03-30 | End: 2022-09-16 | Stop reason: SDUPTHER

## 2022-03-30 NOTE — TELEPHONE ENCOUNTER
Any informed&would like to add iron panel to labs due in June.  She states if she is still alive by then she will get them

## 2022-03-30 NOTE — TELEPHONE ENCOUNTER
Patient declines sooner appt, she will not come to [place] location. She declines a different facility for her US because she will notr travel further. She will see how it goes.

## 2022-03-30 NOTE — TELEPHONE ENCOUNTER
Orders Placed This Encounter   Procedures    Ferritin     Standing Status:   Future     Standing Expiration Date:   3/30/2023    Iron and TIBC     Standing Status:   Future     Standing Expiration Date:   3/30/2023     Order Specific Question:   Is Patient Fasting? Answer:   yes     Order Specific Question:   No of Hours? Answer:   8     Iron orders added in a separate order from order that was written at her office appointment so make sure when she does the labs that they drop both    If the symptoms are worsening or not improving I would recommend earlier appointment. I am uncertain if medications are causing the aches such as the atorvastatin.   I am not sure if aches are coming from the lower back and would consider physical therapy or physical medicine rehabilitation evaluation to see if they have any insight as to what may be causing as well    Thanks

## 2022-03-30 NOTE — TELEPHONE ENCOUNTER
Patient notified. She said she would like to try the Lasix and wants it sent to [facility] in [place]. She also wants to know what she can take for pain for her leg muscles? she has been taking advil its not working.

## 2022-03-30 NOTE — TELEPHONE ENCOUNTER
Requested Prescriptions     Signed Prescriptions Disp Refills    furosemide (LASIX) 20 MG tablet 30 tablet 2     Sig: Take 1 tablet by mouth daily     Authorizing Provider: Alicia Smith sent to pharmacy    In regards to the aches and unfortunately not certain as to exactly what is causing them.   Would consider adding iron labs to current order to make sure no iron deficiency

## 2022-04-26 ENCOUNTER — OFFICE VISIT (OUTPATIENT)
Dept: FAMILY MEDICINE CLINIC | Age: 77
End: 2022-04-26
Payer: MEDICARE

## 2022-04-26 VITALS
SYSTOLIC BLOOD PRESSURE: 180 MMHG | OXYGEN SATURATION: 99 % | BODY MASS INDEX: 35.15 KG/M2 | DIASTOLIC BLOOD PRESSURE: 90 MMHG | WEIGHT: 191 LBS | RESPIRATION RATE: 18 BRPM | HEART RATE: 108 BPM | HEIGHT: 62 IN | TEMPERATURE: 97.6 F

## 2022-04-26 DIAGNOSIS — N18.2 TYPE 2 DIABETES MELLITUS WITH STAGE 2 CHRONIC KIDNEY DISEASE, WITH LONG-TERM CURRENT USE OF INSULIN (HCC): ICD-10-CM

## 2022-04-26 DIAGNOSIS — Z79.4 TYPE 2 DIABETES MELLITUS WITH STAGE 2 CHRONIC KIDNEY DISEASE, WITH LONG-TERM CURRENT USE OF INSULIN (HCC): ICD-10-CM

## 2022-04-26 DIAGNOSIS — E78.2 MIXED HYPERLIPIDEMIA: ICD-10-CM

## 2022-04-26 DIAGNOSIS — R79.9 ABNORMAL FINDING OF BLOOD CHEMISTRY, UNSPECIFIED: ICD-10-CM

## 2022-04-26 DIAGNOSIS — M79.605 PAIN IN BOTH LOWER EXTREMITIES: ICD-10-CM

## 2022-04-26 DIAGNOSIS — R53.83 OTHER FATIGUE: ICD-10-CM

## 2022-04-26 DIAGNOSIS — Z79.899 LONG TERM CURRENT USE OF THERAPEUTIC DRUG: ICD-10-CM

## 2022-04-26 DIAGNOSIS — E55.9 VITAMIN D DEFICIENCY: ICD-10-CM

## 2022-04-26 DIAGNOSIS — M79.10 MYALGIA: Primary | ICD-10-CM

## 2022-04-26 DIAGNOSIS — E11.22 TYPE 2 DIABETES MELLITUS WITH STAGE 2 CHRONIC KIDNEY DISEASE, WITH LONG-TERM CURRENT USE OF INSULIN (HCC): ICD-10-CM

## 2022-04-26 DIAGNOSIS — M79.10 MYALGIA: ICD-10-CM

## 2022-04-26 DIAGNOSIS — M79.604 PAIN IN BOTH LOWER EXTREMITIES: ICD-10-CM

## 2022-04-26 LAB
ALBUMIN SERPL-MCNC: 4.2 G/DL (ref 3.5–5.2)
ALP BLD-CCNC: 143 U/L (ref 35–104)
ALT SERPL-CCNC: 15 U/L (ref 0–32)
ANION GAP SERPL CALCULATED.3IONS-SCNC: 17 MMOL/L (ref 7–16)
AST SERPL-CCNC: 22 U/L (ref 0–31)
BASOPHILS ABSOLUTE: 0.05 E9/L (ref 0–0.2)
BASOPHILS RELATIVE PERCENT: 0.5 % (ref 0–2)
BILIRUB SERPL-MCNC: 0.3 MG/DL (ref 0–1.2)
BUN BLDV-MCNC: 38 MG/DL (ref 6–23)
CALCIUM SERPL-MCNC: 10 MG/DL (ref 8.6–10.2)
CHLORIDE BLD-SCNC: 98 MMOL/L (ref 98–107)
CHOLESTEROL, FASTING: 191 MG/DL (ref 0–199)
CO2: 26 MMOL/L (ref 22–29)
CREAT SERPL-MCNC: 1.3 MG/DL (ref 0.5–1)
EOSINOPHILS ABSOLUTE: 0.15 E9/L (ref 0.05–0.5)
EOSINOPHILS RELATIVE PERCENT: 1.5 % (ref 0–6)
FERRITIN: 135 NG/ML
FOLATE: >20 NG/ML (ref 4.8–24.2)
GFR AFRICAN AMERICAN: 48
GFR NON-AFRICAN AMERICAN: 40 ML/MIN/1.73
GLUCOSE BLD-MCNC: 132 MG/DL (ref 74–99)
HBA1C MFR BLD: 9 % (ref 4–5.6)
HCT VFR BLD CALC: 41.9 % (ref 34–48)
HDLC SERPL-MCNC: 59 MG/DL
HEMOGLOBIN: 13.6 G/DL (ref 11.5–15.5)
IMMATURE GRANULOCYTES #: 0.05 E9/L
IMMATURE GRANULOCYTES %: 0.5 % (ref 0–5)
IRON SATURATION: 15 % (ref 15–50)
IRON: 48 MCG/DL (ref 37–145)
LDL CHOLESTEROL CALCULATED: 115 MG/DL (ref 0–99)
LYMPHOCYTES ABSOLUTE: 1.4 E9/L (ref 1.5–4)
LYMPHOCYTES RELATIVE PERCENT: 13.6 % (ref 20–42)
MAGNESIUM: 1.6 MG/DL (ref 1.6–2.6)
MCH RBC QN AUTO: 28.3 PG (ref 26–35)
MCHC RBC AUTO-ENTMCNC: 32.5 % (ref 32–34.5)
MCV RBC AUTO: 87.3 FL (ref 80–99.9)
MONOCYTES ABSOLUTE: 0.46 E9/L (ref 0.1–0.95)
MONOCYTES RELATIVE PERCENT: 4.5 % (ref 2–12)
NEUTROPHILS ABSOLUTE: 8.22 E9/L (ref 1.8–7.3)
NEUTROPHILS RELATIVE PERCENT: 79.4 % (ref 43–80)
PDW BLD-RTO: 13.4 FL (ref 11.5–15)
PLATELET # BLD: 237 E9/L (ref 130–450)
PMV BLD AUTO: 11 FL (ref 7–12)
POTASSIUM SERPL-SCNC: 3.2 MMOL/L (ref 3.5–5)
RBC # BLD: 4.8 E12/L (ref 3.5–5.5)
SODIUM BLD-SCNC: 141 MMOL/L (ref 132–146)
TOTAL CK: 212 U/L (ref 20–180)
TOTAL IRON BINDING CAPACITY: 326 MCG/DL (ref 250–450)
TOTAL PROTEIN: 7.3 G/DL (ref 6.4–8.3)
TRIGLYCERIDE, FASTING: 86 MG/DL (ref 0–149)
TSH SERPL DL<=0.05 MIU/L-ACNC: 2.01 UIU/ML (ref 0.27–4.2)
VITAMIN B-12: 598 PG/ML (ref 211–946)
VLDLC SERPL CALC-MCNC: 17 MG/DL
WBC # BLD: 10.3 E9/L (ref 4.5–11.5)

## 2022-04-26 PROCEDURE — 3288F FALL RISK ASSESSMENT DOCD: CPT | Performed by: INTERNAL MEDICINE

## 2022-04-26 PROCEDURE — 99213 OFFICE O/P EST LOW 20 MIN: CPT | Performed by: INTERNAL MEDICINE

## 2022-04-26 RX ORDER — METHYLPREDNISOLONE 4 MG/1
TABLET ORAL
Qty: 1 KIT | Refills: 0 | Status: SHIPPED | OUTPATIENT
Start: 2022-04-26 | End: 2022-05-02

## 2022-04-26 ASSESSMENT — ENCOUNTER SYMPTOMS
SORE THROAT: 0
EYE PAIN: 0
COUGH: 0
DIARRHEA: 1
BLOOD IN STOOL: 0
SHORTNESS OF BREATH: 0
ABDOMINAL PAIN: 0
CHEST TIGHTNESS: 0
RHINORRHEA: 0
NAUSEA: 0
CONSTIPATION: 0
VOMITING: 0

## 2022-04-26 ASSESSMENT — PATIENT HEALTH QUESTIONNAIRE - PHQ9
SUM OF ALL RESPONSES TO PHQ QUESTIONS 1-9: 1
SUM OF ALL RESPONSES TO PHQ9 QUESTIONS 1 & 2: 1
2. FEELING DOWN, DEPRESSED OR HOPELESS: 1
SUM OF ALL RESPONSES TO PHQ QUESTIONS 1-9: 1
SUM OF ALL RESPONSES TO PHQ QUESTIONS 1-9: 1
1. LITTLE INTEREST OR PLEASURE IN DOING THINGS: 0
SUM OF ALL RESPONSES TO PHQ QUESTIONS 1-9: 1

## 2022-04-26 NOTE — PROGRESS NOTES
[facility]) Physicians   Internal Medicine     2022  Quoc Hinds : 1945 Sex: female  Age:74 y.o. Chief Complaint   Patient presents with    Leg Pain     bilateral - right more than left at the moment        HPI:   Patient presents to office for evaluation of the following medical concerns. -  has been having aches in b/l legs since (3/2022) , now left more than right. States also with swelling. States aches are worse at night. States feels weak. States muscles ache. States muscles are tender to touch. States having issues with getting up, no strength in legs. States some numbness. States has been taking ibuprofen multiple time a day. -  has been diagnosed with macular degeneration. States has seen retinal specialist and receiving injections. Possible Unsure if Retinopathy.    -  has DM. Following with endocrinology for Diabetes. States monitors sugars at home. States on 70/30 insulin increased to 30 units in am and 20 units in pm. Occasionally will take 15 units at night if suagr is lower. On metformin. Stopped Tradjenta. Adjusted novalog Slide scale if over 200. States has had episodes of hypoglycemia since last visit. Not always compliant with diet. Last a1c was 8.1 (3/2022)     -  has hyperlipidemia. Now on atorvastatin for high intensity statin. Trying to watch diet. No reported side effects. Frequently forgets to take at bedtime.    -  has high blood pressure.  does randomly check blood pressure at home. States 130/80's. On lisinopril and HCTZ and metoprolol. States has some fatigue.    - States right shoulder pain has improved.     - Labs show kidney dysfunction, last lab (2021) - still present      - lab work from 12/10/2021 reviewed with patient 2022    Health Maintenance   - immunizations:   Influenza Vaccination - (10/20/2016) , (2017), (2018), (2019), (2020)   Pneumonia Vaccination - (2018) - prevnar, (2019) - pneumococcal 23 Zoster/Shingles Vaccine  Tetanus Vaccination  covid (2/2/2021) #1, (2/23/2021) #2, (9/28/2021) #3  - pfizer     - Screenings:   Bone Density Scan - (2012) - normal  Pelvic/Pap Exam - (2014), (2015) - gyn  Mammogram - (2015) - gyn    Colonoscopy - declines  FIT (8/2019) - negative, (4/2021) - order     Couseled on Home Safety  - (6/9/2017)    Dilated Eye Exam - (12/2017) - Wet AMD, diabetes, cataract, (5/2018) - no Retinopathy, wet AMD (11/2018)    ROS:  Review of Systems   Constitutional: Negative for appetite change, chills, fever and unexpected weight change. HENT: Negative for congestion, rhinorrhea and sore throat. Eyes: Negative for pain and visual disturbance. Respiratory: Negative for cough, chest tightness and shortness of breath. Cardiovascular: Negative for chest pain and palpitations. Gastrointestinal: Positive for diarrhea. Negative for abdominal pain, blood in stool, constipation, nausea and vomiting. Genitourinary: Negative for difficulty urinating, dysuria, frequency, pelvic pain, urgency and vaginal bleeding. Musculoskeletal: Negative for arthralgias and myalgias. Skin: Negative for rash. Neurological: Negative for dizziness, syncope, weakness, light-headedness, numbness and headaches. Hematological: Negative for adenopathy. Psychiatric/Behavioral: Negative for suicidal ideas. The patient is not nervous/anxious.           Current Outpatient Medications:     methylPREDNISolone (MEDROL DOSEPACK) 4 MG tablet, Take by mouth., Disp: 1 kit, Rfl: 0    furosemide (LASIX) 20 MG tablet, Take 1 tablet by mouth daily, Disp: 30 tablet, Rfl: 2    metoprolol succinate (TOPROL XL) 50 MG extended release tablet, Take 1 tablet by mouth daily, Disp: 90 tablet, Rfl: 0    lisinopril-hydroCHLOROthiazide (PRINZIDE;ZESTORETIC) 20-12.5 MG per tablet, Take 1 tablet by mouth 2 times daily, Disp: 180 tablet, Rfl: 1    hydrALAZINE (APRESOLINE) 10 MG tablet, Take 1 tablet by mouth 2 times daily, Disp: 60 tablet, Rfl: 2    atorvastatin (LIPITOR) 40 MG tablet, Take 1 tablet by mouth daily, Disp: 90 tablet, Rfl: 0    blood glucose test strips (ONETOUCH VERIO) strip, Use to test blood sugar 4 times a day., Disp: 200 each, Rfl: 5    Insulin Syringe-Needle U-100 (BD INSULIN SYRINGE U/F) 31G X 5/16\" 0.5 ML MISC, USE AS DIRECTED TO TAKE INSULIN 3 TIMES A DAY, Disp: 100 each, Rfl: 3    Continuous Blood Gluc  (FREESTYLE FRANNY 2 READER) ALEXIS, Use to swipe the sensor at least every 6 hours, Disp: 1 each, Rfl: 0    Lancets MISC, Use to check BG 1-2x/day, Disp: 200 each, Rfl: 3    Multiple Vitamins-Minerals (PRESERVISION AREDS 2 PO), Take by mouth 2 times daily, Disp: , Rfl:     vitamin D (CHOLECALCIFEROL) 1000 UNIT TABS tablet, Take 1,000 Units by mouth daily, Disp: , Rfl:     Allergies   Allergen Reactions    Penicillins Hives and Other (See Comments)     EDEMA EXTREMITIES       Past Medical History:   Diagnosis Date    Bilateral nonexudative age-related macular degeneration 2019    HLD (hyperlipidemia)     HTN (hypertension)     Stage 2 chronic kidney disease 2019    T2DM (type 2 diabetes mellitus) (HCC)     Uterine cancer ([facility] [address]     s/p surgery    Vitamin D deficiency 2019       Past Surgical History:   Procedure Laterality Date    HYSTERECTOMY, TOTAL ABDOMINAL      TONSILLECTOMY         Family History   Problem Relation Age of Onset    Heart Disease Mother         late onset,  in 80's   [facility] Coronary Art Dis Mother     Heart Disease Father         late onset,  in 80's    Dementia Father     Coronary Art Dis Father     No Known Problems Son     No Known Problems Son     No Known Problems Son        Social History     Socioeconomic History    Marital status:      Spouse name: Not on file    Number of children: 3    Years of education: Not on file    Highest education level: Not on file   Occupational History    Occupation:    Tobacco Use  Smoking status: Never Smoker    Smokeless tobacco: Never Used   Substance and Sexual Activity    Alcohol use: No     Comment: Social very rare    Drug use: No    Sexual activity: Never   Other Topics Concern    Not on file   Social History Narrative    Lives in a house in [place] alone     Social Determinants of Health     Financial Resource Strain: Low Risk     Difficulty of Paying Living Expenses: Not hard at all   Food Insecurity: No Food Insecurity    Worried About Running Out of Food in the Last Year: Never true    [address] in the Last Year: Never true   Transportation Needs:     Lack of Transportation (Medical): Not on file    Lack of Transportation (Non-Medical): Not on file   Physical Activity:     Days of Exercise per Week: Not on file    Minutes of Exercise per Session: Not on file   Stress:     Feeling of Stress : Not on file   Social Connections:     Frequency of Communication with Friends and Family: Not on file    Frequency of Social Gatherings with Friends and Family: Not on file    Attends Shinto Services: Not on file    Active Member of [address] [place] [address] or Organizations: Not on file    Attends Club or Organization Meetings: Not on file    Marital Status: Not on file   Intimate Partner Violence:     Fear of Current or Ex-Partner: Not on file    Emotionally Abused: Not on file    Physically Abused: Not on file    Sexually Abused: Not on file   Housing Stability:     Unable to Pay for Housing in the Last Year: Not on file    Number of [place] in the Last Year: Not on file    Unstable Housing in the Last Year: Not on file       Vitals:    04/26/22 1610   BP: (!) 180/90   Pulse: 108   Resp: 18   Temp: 97.6 °F (36.4 °C)   TempSrc: Temporal   SpO2: 99%   Weight: 191 lb (86.6 kg)   Height: 5' 2\" (1.575 m)       Exam:  Physical Exam  Vitals reviewed. Constitutional:       Appearance: She is well-developed. HENT:      Head: Normocephalic and atraumatic.       Right Ear: External ear normal.      Left Ear: External ear normal.   Eyes:      Pupils: Pupils are equal, round, and reactive to light. Neck:      Thyroid: No thyromegaly. Cardiovascular:      Rate and Rhythm: Normal rate and regular rhythm. Heart sounds: Normal heart sounds. No murmur heard. Pulmonary:      Effort: Pulmonary effort is normal.      Breath sounds: Normal breath sounds. No wheezing. Abdominal:      General: Bowel sounds are normal. There is no distension. Palpations: Abdomen is soft. Tenderness: There is no abdominal tenderness. There is no guarding or rebound. Musculoskeletal:         General: Normal range of motion. Cervical back: Normal range of motion and neck supple. Right lower leg: Edema present. Left lower leg: Edema present. Comments: Tenderness to thigh b/l   weakness to resistance of LE b/l    Lymphadenopathy:      Cervical: No cervical adenopathy. Skin:     General: Skin is warm and dry. Neurological:      Mental Status: She is alert and oriented to person, place, and time. Cranial Nerves: No cranial nerve deficit.    Psychiatric:         Judgment: Judgment normal.         Assessment and Plan:    Diagnoses and all orders for this visit:    Myalgia  - uncertain etiology   - also with LE edema   - check US LE - negative   - check Chen   - recheck labs   - add medrol pack - discussed impacts on blood sugar   - referral to rheumatology     Type 2 diabetes mellitus with stage 2 chronic kidney disease, with long-term current use of insulin ([facility])  - following with endocrinology   - monitor sugar at home  - now on 70/30 insulin 30 units in am and 18 units in pm  - Humalog with meals and slide scale add to that so that any sugar > 200   - on metformin   - monitor for hypoglycemia and action plan  - Last A1c was 8.1 (3/2022)  - non compliant with diet  - established with endocrinology   - discussed to ask endocrinology about invokana for DM kidney disease risk reduction and cardiovascular risk reduction     follows with optho (2/21) wet AMD OS s/p avastin, nonexudative macular OD  on statin  on asa  On ACE     Benign essential hypertension  - monitor blood pressure at home  - watch diet   - declines amlodipine   - on metoprolol  - on lisinopril-HCTZ  - on hydralazine 10mg twice a day   - suggested nephrology - declines   - improved today 4/26/2022    Mixed hyperlipidemia  - watch diet  - on atorvastatin 40mg - high intensity statin  - follow labs   - last lab (12/2021) - improved     Localized edema  - as above     Vitamin D deficiency  - on otc supplement  - follow labs     Endometrial intraepithelial neoplasia (EIN)  -s/p surgery  - follows with gyn    Stage 2 chronic kidney disease  - Noted on labs   - possibly multifactorial (HTN, DM)  - on ACE   - may need to consider nephro referral - declines   - monitor labs - slight change (12/2021)      Return for check up and review, as scheduled. Orders Placed This Encounter   Procedures    Sedimentation rate, automated     Standing Status:   Future     Standing Expiration Date:   4/26/2023    C-Reactive Protein     Standing Status:   Future     Standing Expiration Date:   4/26/2023    Rheumatoid Factor     Standing Status:   Future     Standing Expiration Date:   5/94/1424    Cyclic Citrul Peptide Antibody, IgG     Standing Status:   Future     Standing Expiration Date:   4/26/2023    FAVIAN Titer     Standing Status:   Future     Standing Expiration Date:   4/26/2023   Semaj Avendano, Rheumatology, [place]     Referral Priority:   Urgent     Referral Type:   Eval and Treat     Referral Reason:   Specialty Services Required     Referred to Provider:   Lena Reina DO     Requested Specialty:   Rheumatology     Number of Visits Requested:   1     Requested Prescriptions     Signed Prescriptions Disp Refills    methylPREDNISolone (MEDROL DOSEPACK) 4 MG tablet 1 kit 0     Sig: Take by mouth. Lili Monsivais MD  4/26/2022  4:42 PM

## 2022-04-27 ENCOUNTER — TELEPHONE (OUTPATIENT)
Dept: FAMILY MEDICINE CLINIC | Age: 77
End: 2022-04-27

## 2022-04-27 ENCOUNTER — TELEPHONE (OUTPATIENT)
Dept: ENDOCRINOLOGY | Age: 77
End: 2022-04-27

## 2022-04-27 DIAGNOSIS — E11.65 UNCONTROLLED TYPE 2 DIABETES MELLITUS WITH HYPERGLYCEMIA (HCC): Primary | ICD-10-CM

## 2022-04-27 LAB
C-REACTIVE PROTEIN: 0.3 MG/DL (ref 0–0.4)
RHEUMATOID FACTOR: <10 IU/ML (ref 0–13)
VITAMIN D 25-HYDROXY: 43 NG/ML (ref 30–100)

## 2022-04-27 RX ORDER — INSULIN ASPART 100 [IU]/ML
INJECTION, SOLUTION INTRAVENOUS; SUBCUTANEOUS
Qty: 10 ML | Refills: 3 | Status: SHIPPED | OUTPATIENT
Start: 2022-04-27

## 2022-04-27 NOTE — TELEPHONE ENCOUNTER
Please let the patient know that blood work results showed    FAVIAN, ESR, CCP testing was still pending at the time of this note    Creatinine kinase measure of muscle inflammation was slightly elevated, uncertain if related to cholesterol medications and may need to consider a pause. Uncertain if the cholesterol medicine is contributing to current leg symptoms    Potassium level was borderline low and would consider potassium supplement. This may be related to recent Lasix, would recommend potassium 20 mEq daily    Labs showed slight kidney dysfunction. This could be related to medications such as Lasix lisinopril and hydrochlorothiazide and will need to follow closely    Sugar was elevated.   Hemoglobin A1c is a measure of 3-month sugar control was still uncontrolled at 9.0    Cholesterol levels were still slightly elevated and slightly more elevated when compared to previous    Vitamin D level was normal    Iron levels were normal but on the low end of normal, ferritin storage form of iron was normal    Vitamin N59 and folic acid levels were normal    Thyroid levels were normal    Blood counts were normal    Rheumatoid factor is a measure for rheumatoid arthritis was negative    CRP measure of nonspecific inflammation was also normal    Other electrolytes liver functions were normal    Thanks

## 2022-04-28 NOTE — TELEPHONE ENCOUNTER
Spoke with patient, she was informed and gave a verbal understanding. Patient wanted to let Dr. Meagan Vega know that she is feeling a little bit better with her legs. Patient stated that she still can't go up stairs yet, but she is able to stand and walk a little bit better. Patient did stated that the medication is making her glucose high, yet she was informed prior that this could be a possibility. Patient was wondering if you still wanted her to see the rheumatologist? Please advise.

## 2022-04-28 NOTE — TELEPHONE ENCOUNTER
Yes I would still have her see the rheumatologist as I am still uncertain as to what is causing her symptoms

## 2022-05-02 ENCOUNTER — TELEPHONE (OUTPATIENT)
Dept: ADMINISTRATIVE | Age: 77
End: 2022-05-02

## 2022-05-02 ENCOUNTER — TELEPHONE (OUTPATIENT)
Dept: FAMILY MEDICINE CLINIC | Age: 77
End: 2022-05-02

## 2022-05-02 LAB — CYCLIC CITRULLINATED PEPTIDE ANTIBODY IGG: 4.3 U/ML (ref 0–7)

## 2022-05-02 NOTE — TELEPHONE ENCOUNTER
Finished prednisone and still feeling how she was when she saw you last week, she says she cannot get in with Dr Dorean Favre until July she thinks but she can't get them to call her back.  She wants to know what she should do

## 2022-05-02 NOTE — TELEPHONE ENCOUNTER
I would recommend stopping the statin at this point as I am not sure exactly what is the cause but that was the only other thing that I think I saw that was different at the time when symptoms had started    Otherwise I would recommend emergency room for quicker and more extensive work-up

## 2022-05-02 NOTE — TELEPHONE ENCOUNTER
Pt called to schedule her urgent referral with Dr Alondra Hsu. I did try to reach the office staff to schedule her appt, but due to patient care, they were not available. She would like a r/c when possible.

## 2022-05-04 ENCOUNTER — TELEPHONE (OUTPATIENT)
Dept: FAMILY MEDICINE CLINIC | Age: 77
End: 2022-05-04

## 2022-05-04 DIAGNOSIS — M79.10 MYALGIA: Primary | ICD-10-CM

## 2022-05-04 DIAGNOSIS — R29.898 WEAKNESS OF BOTH LOWER EXTREMITIES: ICD-10-CM

## 2022-05-04 NOTE — TELEPHONE ENCOUNTER
After giving some extra thought would also consider MRI of the lumbar spine and referral to physical medicine rehabilitation

## 2022-05-04 NOTE — TELEPHONE ENCOUNTER
Please let the patient know that remaining blood work for rheumatoid arthritis, anti-CCP antibodies were negative    Thanks    Please asked the patient if her symptoms have improved with stopping statin medication

## 2022-05-05 NOTE — TELEPHONE ENCOUNTER
Patient has agreed to MRI and referral.  She would like to have the MRI done at [facility]. She wants to know if you could refer her to someone at the [facility]?

## 2022-05-05 NOTE — TELEPHONE ENCOUNTER
Orders Placed This Encounter   Procedures    MRI LUMBAR SPINE WO CONTRAST     Standing Status:   Future     Standing Expiration Date:   5/5/2023     Order Specific Question:   Reason for exam:     Answer:   LE weakness     Order Specific Question:   What is the sedation requirement?      Answer:   None    External Referral To Physical Medicine Rehab     Referral Priority:   Routine     Referral Type:   Eval and Treat     Referral Reason:   Specialty Services Required     Referred to Provider:   Kerry Buchanan MD     Requested Specialty:   Physical Medicine and Rehab     Number of Visits Requested:   1     Orders and referral placed

## 2022-05-10 ENCOUNTER — TELEPHONE (OUTPATIENT)
Dept: FAMILY MEDICINE CLINIC | Age: 77
End: 2022-05-10

## 2022-05-10 NOTE — TELEPHONE ENCOUNTER
Odalys Vivas calling did you end up finding a [facility] to refer her too? She states she has very weak legs&keeps falling.

## 2022-05-10 NOTE — TELEPHONE ENCOUNTER
Yes I did and placed referral last week     If she is having that much difficulty would recommend ER now!

## 2022-05-11 NOTE — TELEPHONE ENCOUNTER
Thank you for the update.   I am very concerned regarding symptoms and would not hesitate to go to the hospital with worsening

## 2022-05-12 DIAGNOSIS — N18.2 TYPE 2 DIABETES MELLITUS WITH STAGE 2 CHRONIC KIDNEY DISEASE, WITH LONG-TERM CURRENT USE OF INSULIN (HCC): Primary | ICD-10-CM

## 2022-05-12 DIAGNOSIS — Z79.4 TYPE 2 DIABETES MELLITUS WITH STAGE 2 CHRONIC KIDNEY DISEASE, WITH LONG-TERM CURRENT USE OF INSULIN (HCC): Primary | ICD-10-CM

## 2022-05-12 DIAGNOSIS — E11.22 TYPE 2 DIABETES MELLITUS WITH STAGE 2 CHRONIC KIDNEY DISEASE, WITH LONG-TERM CURRENT USE OF INSULIN (HCC): Primary | ICD-10-CM

## 2022-06-01 ENCOUNTER — TELEPHONE (OUTPATIENT)
Dept: FAMILY MEDICINE CLINIC | Age: 77
End: 2022-06-01

## 2022-06-01 ENCOUNTER — HOSPITAL ENCOUNTER (EMERGENCY)
Age: 77
Discharge: HOME | End: 2022-06-01
Attending: EMERGENCY MEDICINE
Payer: MEDICARE

## 2022-06-01 ENCOUNTER — APPOINTMENT (OUTPATIENT)
Dept: GENERAL RADIOLOGY | Age: 77
End: 2022-06-01
Payer: MEDICARE

## 2022-06-01 VITALS
RESPIRATION RATE: 16 BRPM | HEART RATE: 79 BPM | TEMPERATURE: 96.6 F | SYSTOLIC BLOOD PRESSURE: 166 MMHG | DIASTOLIC BLOOD PRESSURE: 61 MMHG | HEIGHT: 62 IN | OXYGEN SATURATION: 97 % | BODY MASS INDEX: 34.96 KG/M2 | WEIGHT: 190 LBS

## 2022-06-01 DIAGNOSIS — I10 PRIMARY HYPERTENSION: Primary | ICD-10-CM

## 2022-06-01 LAB
ALBUMIN SERPL-MCNC: 4 G/DL (ref 3.5–5.2)
ALP BLD-CCNC: 126 U/L (ref 35–104)
ALT SERPL-CCNC: 16 U/L (ref 0–32)
ANION GAP SERPL CALCULATED.3IONS-SCNC: 14 MMOL/L (ref 7–16)
AST SERPL-CCNC: 21 U/L (ref 0–31)
BASOPHILS ABSOLUTE: 0.03 E9/L (ref 0–0.2)
BASOPHILS RELATIVE PERCENT: 0.4 % (ref 0–2)
BILIRUB SERPL-MCNC: 0.2 MG/DL (ref 0–1.2)
BUN BLDV-MCNC: 26 MG/DL (ref 6–23)
CALCIUM SERPL-MCNC: 9.7 MG/DL (ref 8.6–10.2)
CHLORIDE BLD-SCNC: 100 MMOL/L (ref 98–107)
CO2: 26 MMOL/L (ref 22–29)
CREAT SERPL-MCNC: 1 MG/DL (ref 0.5–1)
EOSINOPHILS ABSOLUTE: 0.08 E9/L (ref 0.05–0.5)
EOSINOPHILS RELATIVE PERCENT: 1 % (ref 0–6)
GFR AFRICAN AMERICAN: >60
GFR NON-AFRICAN AMERICAN: 54 ML/MIN/1.73
GLUCOSE BLD-MCNC: 138 MG/DL (ref 74–99)
HCT VFR BLD CALC: 38.4 % (ref 34–48)
HEMOGLOBIN: 12.6 G/DL (ref 11.5–15.5)
IMMATURE GRANULOCYTES #: 0.05 E9/L
IMMATURE GRANULOCYTES %: 0.6 % (ref 0–5)
LYMPHOCYTES ABSOLUTE: 0.85 E9/L (ref 1.5–4)
LYMPHOCYTES RELATIVE PERCENT: 10.4 % (ref 20–42)
MCH RBC QN AUTO: 28.6 PG (ref 26–35)
MCHC RBC AUTO-ENTMCNC: 32.8 % (ref 32–34.5)
MCV RBC AUTO: 87.1 FL (ref 80–99.9)
MONOCYTES ABSOLUTE: 0.31 E9/L (ref 0.1–0.95)
MONOCYTES RELATIVE PERCENT: 3.8 % (ref 2–12)
NEUTROPHILS ABSOLUTE: 6.83 E9/L (ref 1.8–7.3)
NEUTROPHILS RELATIVE PERCENT: 83.8 % (ref 43–80)
PDW BLD-RTO: 13.4 FL (ref 11.5–15)
PLATELET # BLD: 191 E9/L (ref 130–450)
PMV BLD AUTO: 10.4 FL (ref 7–12)
POTASSIUM SERPL-SCNC: 3.7 MMOL/L (ref 3.5–5)
PRO-BNP: 337 PG/ML (ref 0–450)
RBC # BLD: 4.41 E12/L (ref 3.5–5.5)
SODIUM BLD-SCNC: 140 MMOL/L (ref 132–146)
TOTAL PROTEIN: 6.8 G/DL (ref 6.4–8.3)
TROPONIN, HIGH SENSITIVITY: 27 NG/L (ref 0–9)
TROPONIN, HIGH SENSITIVITY: 28 NG/L (ref 0–9)
WBC # BLD: 8.2 E9/L (ref 4.5–11.5)

## 2022-06-01 PROCEDURE — 99285 EMERGENCY DEPT VISIT HI MDM: CPT

## 2022-06-01 PROCEDURE — 71046 X-RAY EXAM CHEST 2 VIEWS: CPT

## 2022-06-01 PROCEDURE — 85025 COMPLETE CBC W/AUTO DIFF WBC: CPT

## 2022-06-01 PROCEDURE — 80053 COMPREHEN METABOLIC PANEL: CPT

## 2022-06-01 PROCEDURE — 83880 ASSAY OF NATRIURETIC PEPTIDE: CPT

## 2022-06-01 PROCEDURE — 93005 ELECTROCARDIOGRAM TRACING: CPT | Performed by: PHYSICIAN ASSISTANT

## 2022-06-01 PROCEDURE — 36415 COLL VENOUS BLD VENIPUNCTURE: CPT

## 2022-06-01 PROCEDURE — 84484 ASSAY OF TROPONIN QUANT: CPT

## 2022-06-01 NOTE — ED PROVIDER NOTES
HPI:  6/1/22, Time: 1:39 PM EDT        Rosangela Alvarenga is a 68 y.o. female presenting to the ED for asymptomatic hypertension, beginning 1 day ago. The complaint has been persistent, mild in severity, and worsened by nothing. Patient states she has no symptoms of hypertension. Does take all the medications for hypertension states she was in [facility] clinic recently for her diabetic neuropathy of the lower extremities bilaterally. States this is chronic for her and she has been treated with gabapentin for it. She states that she is getting home physical therapy for improvement of her lower extremity strength bilaterally and her physical therapist assistant who sees her at her house checked her blood pressure today and found it to be elevated. She does admit to taking all of her hypertensive medications except for her Lasix. She states she stopped taking her Lasix which is prescribed 20 mg/day for the last 2 weeks due to her feeling like she is not drinking as much water as she used to do so she did not want to get dehydrated. She does admit to mild swelling bilateral lower extremities equal bilaterally. Denies any ongoing fever, chills, shortness of breath. .     Patient denies fever/chills, sore throat, cough, congestion, chest pain, shortness of breath, edema, headache, visual disturbances, focal paresthesias, focal weakness, abdominal pain, nausea, vomiting, diarrhea, constipation, dysuria, hematuria, trauma, neck or back pain, rash or other complaints.      ROS:   A complete review of systems was performed and all pertinent positives and negatives are stated within HPI, all other systems reviewed and are negative.            --------------------------------------------- PAST HISTORY ---------------------------------------------  Past Medical History:  has a past medical history of Bilateral nonexudative age-related macular degeneration, HLD (hyperlipidemia), HTN (hypertension), Stage 2 chronic kidney disease, T2DM (type 2 diabetes mellitus) ([facility] [address] Uterine cancer ([facility] [address] and Vitamin D deficiency. Past Surgical History:  has a past surgical history that includes Hysterectomy, total abdominal and Tonsillectomy. Social History:  reports that she has never smoked. She has never used smokeless tobacco. She reports that she does not drink alcohol and does not use drugs. Family History: family history includes Coronary Art Dis in her father and mother; Dementia in her father; Heart Disease in her father and mother; No Known Problems in her son, son, and son. The patients home medications have been reviewed. Allergies: Penicillins        ----------------------------------------PHYSICAL EXAM--------------------------------------    Constitutional:  Well developed, well nourished, no acute distress, non-toxic appearance   Eyes:  PERRL, conjunctiva normal, EOMI  HENT:  Atraumatic, external ears normal, nose normal, oropharynx moist, no pharyngeal exudates. Neck- normal range of motion, no nuchal rigidity   Respiratory:  No respiratory distress, normal breath sounds, no rales, no wheezing   Cardiovascular:  Normal rate, normal rhythm, no murmurs, no gallops, no rubs. Radial and DP pulses 2+ bilaterally. GI:  Soft, nondistended, normal bowel sounds, nontender, no organomegaly, no mass, no rebound, no guarding   :  No costovertebral angle tenderness   Musculoskeletal:  No edema, no tenderness, no deformities. Back- no tenderness  Integument:  Well hydrated, no rash. Adequate perfusion. Lymphatic:  No cervical lymphadenopathy noted   Neurologic:  Alert & oriented x 3, CN 2-12 normal, normal motor function, normal sensory function, no focal deficits noted. Normal gait.     Psychiatric:  Speech and behavior appropriate       -------------------------------------------------- RESULTS -------------------------------------------------  I have personally reviewed all laboratory and imaging results for this patient. Results are listed below.      LABS:  Results for orders placed or performed during the hospital encounter of 06/01/22   CBC with Auto Differential   Result Value Ref Range    WBC 8.2 4.5 - 11.5 E9/L    RBC 4.41 3.50 - 5.50 E12/L    Hemoglobin 12.6 11.5 - 15.5 g/dL    Hematocrit 38.4 34.0 - 48.0 %    MCV 87.1 80.0 - 99.9 fL    MCH 28.6 26.0 - 35.0 pg    MCHC 32.8 32.0 - 34.5 %    RDW 13.4 11.5 - 15.0 fL    Platelets 802 745 - 308 E9/L    MPV 10.4 7.0 - 12.0 fL    Neutrophils % 83.8 (H) 43.0 - 80.0 %    Immature Granulocytes % 0.6 0.0 - 5.0 %    Lymphocytes % 10.4 (L) 20.0 - 42.0 %    Monocytes % 3.8 2.0 - 12.0 %    Eosinophils % 1.0 0.0 - 6.0 %    Basophils % 0.4 0.0 - 2.0 %    Neutrophils Absolute 6.83 1.80 - 7.30 E9/L    Immature Granulocytes # 0.05 E9/L    Lymphocytes Absolute 0.85 (L) 1.50 - 4.00 E9/L    Monocytes Absolute 0.31 0.10 - 0.95 E9/L    Eosinophils Absolute 0.08 0.05 - 0.50 E9/L    Basophils Absolute 0.03 0.00 - 0.20 E9/L   Comprehensive Metabolic Panel   Result Value Ref Range    Sodium 140 132 - 146 mmol/L    Potassium 3.7 3.5 - 5.0 mmol/L    Chloride 100 98 - 107 mmol/L    CO2 26 22 - 29 mmol/L    Anion Gap 14 7 - 16 mmol/L    Glucose 138 (H) 74 - 99 mg/dL    BUN 26 (H) 6 - 23 mg/dL    CREATININE 1.0 0.5 - 1.0 mg/dL    GFR Non-African American 54 >=60 mL/min/1.73    GFR African American >60     Calcium 9.7 8.6 - 10.2 mg/dL    Total Protein 6.8 6.4 - 8.3 g/dL    Albumin 4.0 3.5 - 5.2 g/dL    Total Bilirubin 0.2 0.0 - 1.2 mg/dL    Alkaline Phosphatase 126 (H) 35 - 104 U/L    ALT 16 0 - 32 U/L    AST 21 0 - 31 U/L   Troponin   Result Value Ref Range    Troponin, High Sensitivity 28 (H) 0 - 9 ng/L   Troponin   Result Value Ref Range    Troponin, High Sensitivity 27 (H) 0 - 9 ng/L   Brain Natriuretic Peptide   Result Value Ref Range    Pro- 0 - 450 pg/mL   EKG 12 Lead   Result Value Ref Range    Ventricular Rate 87 BPM    Atrial Rate 87 BPM    P-R Interval 186 ms    QRS Duration 68 ms Q-T Interval 366 ms    QTc Calculation (Bazett) 440 ms    P Axis 37 degrees    R Axis -23 degrees    T Axis 32 degrees       RADIOLOGY:  Interpreted by Radiologist.  XR CHEST (2 VW)   Final Result   No acute process. EKG Interpretation by Me  Time: 1310  Rhythm: normal sinus   Rate: normal  Axis: normal  Conduction: normal  ST Segments: no acute change  T Waves: no acute change  Clinical Impression: no acute changes  Comparison to prior EKG: no previous EKG      ------------------------- NURSING NOTES AND VITALS REVIEWED ---------------------------  The nursing notes within the ED encounter and vital signs as below have been reviewed by myself. BP (!) 166/61   Pulse 79   Temp (!) 96.6 °F (35.9 °C) (Temporal)   Resp 16   Ht 5' 2\" (1.575 m)   Wt 190 lb (86.2 kg)   SpO2 97%   BMI 34.75 kg/m²   Oxygen Saturation Interpretation: Normal      The patients available past medical records and past encounters were reviewed. ------------------------------ ED COURSE/MEDICAL DECISION MAKING----------------------  Medications - No data to display       MEDICATIONS  Discharge Medication List as of 6/1/2022  3:52 PM            Medical Decision Making:    Patient continued to have asymptomatic hypertension. Well-appearing interacting at baseline. Son is present and reaffirms the patient is at baseline. Patient in no acute distress and well-appearing. Patient has asymptomatic hypertension but did improve on its own while in the emergency Yane Gallo she did states she took her medicine this morning which likely contributed to the blood pressure improvement. Continues denying no chest pain or shortness of breath. Upon reassuring reevaluation patient discharged follow-up to her PCP which she has new appointment for in 1 week. Patient was explicitly instructed on specific signs and symptoms on which to return to the emergency room for.  Patient was instructed to return to the ER for any new or worsening symptoms. Additional discharge instructions were given verbally. All questions were answered. Patient is comfortable and agreeable with discharge plan. Patient in no acute distress and non-toxic in appearance. This patient's ED course included: re-evaluation prior to disposition, multiple bedside re-evaluations, IV medications, cardiac monitoring, continuous pulse oximetry, complex medical decision making and emergency management and a personal history and physicial eaxmination    This patient has remained hemodynamically stable and been closely monitored during their ED course. Re-Evaluations:  Time: 1400   Re-evaluation. Patients symptoms show no change  Repeat physical examination is not changed        Counseling: The emergency provider has spoken with the patient and discussed todays results, in addition to providing specific details for the plan of care and counseling regarding the diagnosis and prognosis. Questions are answered at this time and they are agreeable with the plan.    --------------------------- IMPRESSION AND DISPOSITION ---------------------------------    IMPRESSION  1.  Primary hypertension        DISPOSITION  Disposition: Discharge to home  Patient condition is stable             Nena Mullins DO  Resident  06/02/22 4206

## 2022-06-01 NOTE — TELEPHONE ENCOUNTER
Tarsha Marks a physical therpaist for home health is there to eval her . Her bp 10 minutes ago was 220/106. She is not having any ill s/s. He states that has been the reading the past three times.  I did recommend he have her to go nearest ED

## 2022-06-01 NOTE — ED NOTES
Department of Emergency Medicine  FIRST PROVIDER TRIAGE NOTE             Independent MLP           6/1/22  11:59 AM EDT    Date of Encounter: 6/1/22   MRN: [id number]      HPI: Kit Rosales is a 68 y.o. female who presents to the ED for Hypertension (HTN per home PT)     Pt presenting for HTN at home PT today. Pt took morning dose of BP medications. Sent by PCP. Patient has no complaints at this time. ROS: Negative for cp, sob, headache or dizziness. PE: Gen Appearance/Constitutional: alert  HEENT: NC/NT. PERRLA,  Airway patent. Initial Plan of Care: All treatment areas with department are currently occupied. Plan to order/Initiate the following while awaiting opening in ED: labs and EKG.   Initiate Treatment-Testing, Proceed toTreatment Area When Bed Available for ED Attending/MLP to Continue Care    Electronically signed by Libra Wyman PA-C   DD: 6/1/22       Libra Wyman PA-C  06/01/22 1200

## 2022-06-01 NOTE — TELEPHONE ENCOUNTER
Spoke to patient and she is already in the ER. Did an EKG on her and labs. BP recheck while there was 148/84. They have to recheck her enzymes per patient to rule out TIA.

## 2022-06-02 LAB
EKG ATRIAL RATE: 87 BPM
EKG P AXIS: 37 DEGREES
EKG P-R INTERVAL: 186 MS
EKG Q-T INTERVAL: 366 MS
EKG QRS DURATION: 68 MS
EKG QTC CALCULATION (BAZETT): 440 MS
EKG R AXIS: -23 DEGREES
EKG T AXIS: 32 DEGREES
EKG VENTRICULAR RATE: 87 BPM

## 2022-06-08 DIAGNOSIS — Z79.4 TYPE 2 DIABETES MELLITUS WITHOUT COMPLICATION, WITH LONG-TERM CURRENT USE OF INSULIN (HCC): ICD-10-CM

## 2022-06-08 DIAGNOSIS — E11.9 TYPE 2 DIABETES MELLITUS WITHOUT COMPLICATION, WITH LONG-TERM CURRENT USE OF INSULIN (HCC): ICD-10-CM

## 2022-06-14 RX ORDER — HYDRALAZINE HYDROCHLORIDE 25 MG/1
25 TABLET, FILM COATED ORAL DAILY
COMMUNITY
End: 2022-11-04 | Stop reason: SDUPTHER

## 2022-06-14 NOTE — TELEPHONE ENCOUNTER
Please reach out to the patient to see how she is progressing and if has any updates or any new concerns at present or if needs a sooner schedule follow-up appointment

## 2022-06-30 ENCOUNTER — HOSPITAL ENCOUNTER (OUTPATIENT)
Dept: INTERVENTIONAL RADIOLOGY/VASCULAR | Age: 77
Discharge: HOME | End: 2022-07-02
Payer: MEDICARE

## 2022-06-30 DIAGNOSIS — E08.65 DIABETES MELLITUS DUE TO UNDERLYING CONDITION WITH HYPERGLYCEMIA, WITH LONG-TERM CURRENT USE OF INSULIN (HCC): ICD-10-CM

## 2022-06-30 DIAGNOSIS — M79.605 PAIN IN BOTH LOWER EXTREMITIES: ICD-10-CM

## 2022-06-30 DIAGNOSIS — M79.604 PAIN IN BOTH LOWER EXTREMITIES: ICD-10-CM

## 2022-06-30 DIAGNOSIS — Z79.4 DIABETES MELLITUS DUE TO UNDERLYING CONDITION WITH HYPERGLYCEMIA, WITH LONG-TERM CURRENT USE OF INSULIN (HCC): ICD-10-CM

## 2022-06-30 PROCEDURE — 93922 UPR/L XTREMITY ART 2 LEVELS: CPT

## 2022-06-30 SDOH — HEALTH STABILITY: PHYSICAL HEALTH: ON AVERAGE, HOW MANY DAYS PER WEEK DO YOU ENGAGE IN MODERATE TO STRENUOUS EXERCISE (LIKE A BRISK WALK)?: 3 DAYS

## 2022-06-30 SDOH — HEALTH STABILITY: PHYSICAL HEALTH: ON AVERAGE, HOW MANY MINUTES DO YOU ENGAGE IN EXERCISE AT THIS LEVEL?: 30 MIN

## 2022-06-30 ASSESSMENT — LIFESTYLE VARIABLES
HOW OFTEN DO YOU HAVE A DRINK CONTAINING ALCOHOL: 1
HOW OFTEN DO YOU HAVE SIX OR MORE DRINKS ON ONE OCCASION: 1
HOW MANY STANDARD DRINKS CONTAINING ALCOHOL DO YOU HAVE ON A TYPICAL DAY: PATIENT DECLINED
HOW OFTEN DO YOU HAVE A DRINK CONTAINING ALCOHOL: NEVER
HOW MANY STANDARD DRINKS CONTAINING ALCOHOL DO YOU HAVE ON A TYPICAL DAY: 98

## 2022-06-30 ASSESSMENT — PATIENT HEALTH QUESTIONNAIRE - PHQ9
SUM OF ALL RESPONSES TO PHQ QUESTIONS 1-9: 1
SUM OF ALL RESPONSES TO PHQ QUESTIONS 1-9: 1
2. FEELING DOWN, DEPRESSED OR HOPELESS: 1
1. LITTLE INTEREST OR PLEASURE IN DOING THINGS: 0
SUM OF ALL RESPONSES TO PHQ QUESTIONS 1-9: 1
SUM OF ALL RESPONSES TO PHQ QUESTIONS 1-9: 1
SUM OF ALL RESPONSES TO PHQ9 QUESTIONS 1 & 2: 1

## 2022-07-01 ENCOUNTER — OFFICE VISIT (OUTPATIENT)
Dept: PRIMARY CARE CLINIC | Age: 77
End: 2022-07-01

## 2022-07-01 ENCOUNTER — OFFICE VISIT (OUTPATIENT)
Dept: PRIMARY CARE CLINIC | Age: 77
End: 2022-07-01
Payer: MEDICARE

## 2022-07-01 VITALS
HEART RATE: 88 BPM | DIASTOLIC BLOOD PRESSURE: 70 MMHG | BODY MASS INDEX: 34.75 KG/M2 | TEMPERATURE: 98 F | OXYGEN SATURATION: 99 % | HEIGHT: 62 IN | SYSTOLIC BLOOD PRESSURE: 146 MMHG

## 2022-07-01 VITALS
TEMPERATURE: 98 F | HEIGHT: 62 IN | BODY MASS INDEX: 34.75 KG/M2 | HEART RATE: 88 BPM | SYSTOLIC BLOOD PRESSURE: 136 MMHG | OXYGEN SATURATION: 99 % | DIASTOLIC BLOOD PRESSURE: 68 MMHG
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
## 2022-07-01 DIAGNOSIS — H35.3130 BILATERAL NONEXUDATIVE AGE-RELATED MACULAR DEGENERATION, UNSPECIFIED STAGE: ICD-10-CM

## 2022-07-01 DIAGNOSIS — N18.31 STAGE 3A CHRONIC KIDNEY DISEASE (HCC): ICD-10-CM

## 2022-07-01 DIAGNOSIS — I10 BENIGN ESSENTIAL HYPERTENSION: ICD-10-CM

## 2022-07-01 DIAGNOSIS — R29.898 WEAKNESS OF BOTH LOWER LIMBS: Primary | ICD-10-CM

## 2022-07-01 DIAGNOSIS — N18.2 TYPE 2 DIABETES MELLITUS WITH STAGE 2 CHRONIC KIDNEY DISEASE, WITH LONG-TERM CURRENT USE OF INSULIN (HCC): ICD-10-CM

## 2022-07-01 DIAGNOSIS — R60.0 LOCALIZED EDEMA: ICD-10-CM

## 2022-07-01 DIAGNOSIS — E11.22 TYPE 2 DIABETES MELLITUS WITH STAGE 2 CHRONIC KIDNEY DISEASE, WITH LONG-TERM CURRENT USE OF INSULIN (HCC): ICD-10-CM

## 2022-07-01 DIAGNOSIS — Z79.4 TYPE 2 DIABETES MELLITUS WITH STAGE 2 CHRONIC KIDNEY DISEASE, WITH LONG-TERM CURRENT USE OF INSULIN (HCC): ICD-10-CM

## 2022-07-01 DIAGNOSIS — E55.9 VITAMIN D DEFICIENCY: ICD-10-CM

## 2022-07-01 DIAGNOSIS — E78.2 MIXED HYPERLIPIDEMIA: ICD-10-CM

## 2022-07-01 DIAGNOSIS — Z00.00 INITIAL MEDICARE ANNUAL WELLNESS VISIT: Primary | ICD-10-CM

## 2022-07-01 PROCEDURE — G0438 PPPS, INITIAL VISIT: HCPCS | Performed by: INTERNAL MEDICINE

## 2022-07-01 PROCEDURE — 3046F HEMOGLOBIN A1C LEVEL >9.0%: CPT | Performed by: INTERNAL MEDICINE

## 2022-07-01 PROCEDURE — 1123F ACP DISCUSS/DSCN MKR DOCD: CPT | Performed by: INTERNAL MEDICINE

## 2022-07-01 PROCEDURE — 99214 OFFICE O/P EST MOD 30 MIN: CPT | Performed by: INTERNAL MEDICINE

## 2022-07-01 RX ORDER — GABAPENTIN 300 MG/1
300 CAPSULE ORAL DAILY
COMMUNITY
Start: 2022-06-13 | End: 2022-12-10

## 2022-07-01 RX ORDER — METOPROLOL SUCCINATE 50 MG/1
50 TABLET, EXTENDED RELEASE ORAL DAILY
Qty: 90 TABLET | Refills: 0 | Status: CANCELLED | OUTPATIENT
Start: 2022-07-01

## 2022-07-01 RX ORDER — ATORVASTATIN CALCIUM 40 MG/1
40 TABLET, FILM COATED ORAL DAILY
Qty: 90 TABLET | Refills: 0 | Status: SHIPPED | OUTPATIENT
Start: 2022-07-01 | End: 2022-07-29 | Stop reason: SDUPTHER

## 2022-07-01 RX ORDER — ATORVASTATIN CALCIUM 40 MG/1
40 TABLET, FILM COATED ORAL DAILY
Qty: 90 TABLET | Refills: 0 | Status: CANCELLED | OUTPATIENT
Start: 2022-07-01

## 2022-07-01 RX ORDER — METOPROLOL SUCCINATE 50 MG/1
50 TABLET, EXTENDED RELEASE ORAL DAILY
Qty: 90 TABLET | Refills: 0 | Status: SHIPPED | OUTPATIENT
Start: 2022-07-01 | End: 2022-11-04 | Stop reason: SDUPTHER

## 2022-07-01 ASSESSMENT — ENCOUNTER SYMPTOMS
CHEST TIGHTNESS: 0
EYE PAIN: 0
NAUSEA: 0
DIARRHEA: 1
SORE THROAT: 0
ABDOMINAL PAIN: 0
COUGH: 0
BLOOD IN STOOL: 0
VOMITING: 0
CONSTIPATION: 0
SHORTNESS OF BREATH: 0
RHINORRHEA: 0

## 2022-07-01 NOTE — PROGRESS NOTES
Medicare Annual Wellness Visit    Tina Page is here for Medicare AWV    Assessment & Plan   Initial Medicare annual wellness visit           Health Maintenance   - immunizations:   Influenza Vaccination - (10/20/2016) , (2017), (2018), (2019), (2020)   Pneumonia Vaccination - (2018) - prevnar, (12/2019) - pneumococcal 23   Zoster/Shingles Vaccine  Tetanus Vaccination  covid (2/2/2021) #1, (2/23/2021) #2, (9/28/2021) #3  - pfizer     - Screenings:   Bone Density Scan - (2012) - normal  Pelvic/Pap Exam - (2014), (2015) - gyn  Mammogram - (2015) - gyn    Colonoscopy - declines  FIT (8/2019) - negative, (4/2021) - order     Couseled on Home Safety  - (6/9/2017)    Recommendations for Preventive Services Due: see orders and patient instructions/AVS.  Recommended screening schedule for the next 5-10 years is provided to the patient in written form: see Patient Instructions/AVS.     Return for Medicare Annual Wellness Visit in 1 year. No orders of the defined types were placed in this encounter. Requested Prescriptions      No prescriptions requested or ordered in this encounter          Subjective     Patient's complete Health Risk Assessment and screening values have been reviewed and are found in Flowsheets. The following problems were reviewed today and where indicated follow up appointments were made and/or referrals ordered.     Positive Risk Factor Screenings with Interventions:    Fall Risk:  Do you feel unsteady or are you worried about falling? : (!) yes  2 or more falls in past year?: (!) yes  Fall with injury in past year?: no     Fall Risk Interventions:    · Home safety tips provided  · to start Physical therapy               General Health and ACP:  General  In general, how would you say your health is?: Fair  In the past 7 days, have you experienced any of the following: New or Increased Pain, New or Increased Fatigue, Loneliness, Social Isolation, Stress or Anger?: No  Do you get the social and emotional support that you need?: Yes  Do you have a Living Will?: (!) No    Advance Directives     Power of  Living Will ACP-Advance Directive ACP-Power of     Not on File Not on File Not on File Not on File      General Health Risk Interventions:  · No Living Will: information provided     Health Habits/Nutrition:     Physical Activity: Insufficiently Active    Days of Exercise per Week: 3 days    Minutes of Exercise per Session: 30 min     Have you lost any weight without trying in the past 3 months?: (!) Yes     Have you seen the dentist within the past year?: Yes    Health Habits/Nutrition Interventions:  · Nutritional issues:  educational materials for healthy, well-balanced diet provided    Hearing/Vision:  Do you or your family notice any trouble with your hearing that hasn't been managed with hearing aids?: No  Do you have difficulty driving, watching TV, or doing any of your daily activities because of your eyesight?: (!) Yes  Have you had an eye exam within the past year?: Yes  No exam data present    Hearing/Vision Interventions:  · Vision concerns:  patient encouraged to make appointment with his/her eye specialist     ADLs:  In the past 7 days, did you need help from others to perform any of the following everyday activities: Eating, dressing, grooming, bathing, toileting, or walking/balance?: (!) Yes  Select all that apply: (!) Walking/Balance  In the past 7 days, did you need help from others to take care of any of the following: Laundry, housekeeping, banking/finances, shopping, telephone use, food preparation, transportation, or taking medications?: (!) Yes  Select all that apply: (!) [address] Preparation,Transportation    ADL Interventions:  · Patient declines any further evaluation/treatment for this issue          Objective   Vitals:    07/01/22 0912 07/01/22 0919   BP: (!) 148/72 (!) 146/70   Pulse: 88    Temp: 98 °F (36.7 °C)    SpO2: 99%    Height: 5' 2\" (1.575 m)       Body mass index is 34.75 kg/m². Allergies   Allergen Reactions    Penicillins Hives and Other (See Comments)     EDEMA EXTREMITIES     Prior to Visit Medications    Medication Sig Taking? Authorizing Provider   gabapentin (NEURONTIN) 300 MG capsule Take 300 mg by mouth 3 times daily. Yes Historical Provider, MD   hydrALAZINE (APRESOLINE) 25 MG tablet Take 25 mg by mouth 3 times daily Yes Historical Provider, MD   metFORMIN (GLUCOPHAGE) 500 MG tablet Take 1 tablet by mouth 2 times daily (with meals) Yes Kayla Dugan MD   insulin 70-30 (NOVOLIN 70/30) (70-30) 100 UNIT per ML injection vial 30 units with breakfast and 20 units before dinner Yes Harry Jara MD   insulin aspart (NOVOLOG) 100 UNIT/ML injection vial Take for correction of B unit for every 30 mg/dl if the BG is >140 mg/dl (max of 15 units/day) Yes MARKEL Navarrete - CNS   furosemide (LASIX) 20 MG tablet Take 1 tablet by mouth daily Yes Kerrie Hope MD   lisinopril-hydroCHLOROthiazide (PRINZIDE;ZESTORETIC) 20-12.5 MG per tablet Take 1 tablet by mouth 2 times daily Yes Kerrie Hope MD   blood glucose test strips (ONETOUCH VERIO) strip Use to test blood sugar 4 times a day.  Yes Kayla Dugan MD   Insulin Syringe-Needle U-100 (BD INSULIN SYRINGE U/F) 31G X 5/16\" 0.5 ML MISC USE AS DIRECTED TO TAKE INSULIN 3 TIMES A DAY Yes Nam Dillard MD   Continuous Blood Gluc  (FREESTYLE FRANNY 2 READER) ALEXIS Use to swipe the sensor at least every 6 hours Yes Nam Dillard MD   Lancets MISC Use to check BG 1-2x/day Yes Nam Dillard MD   Multiple Vitamins-Minerals (PRESERVISION AREDS 2 PO) Take by mouth 2 times daily Yes Historical Provider, MD   vitamin D (CHOLECALCIFEROL) 1000 UNIT TABS tablet Take 1,000 Units by mouth daily Yes Historical Provider, MD   metoprolol succinate (TOPROL XL) 50 MG extended release tablet Take 1 tablet by mouth daily  Kerrie Hope MD   atorvastatin (LIPITOR) 40 MG tablet Take 1 tablet by mouth daily  Twyla Cho MD       [facility]Te (Including outside providers/suppliers regularly involved in providing care):   Patient Care Team:  Twyla Cho MD as PCP - General (Internal Medicine)  Twyla Cho MD as PCP - [facility] Empaneled Provider     Reviewed and updated this visit:  Tobacco  Allergies  Meds  Problems  Med Hx  Surg Hx  Soc Hx  Fam Hx                    Electronically signed by Twyla Cho MD on 7/1/2022 at 9:54 AM

## 2022-07-01 NOTE — PATIENT INSTRUCTIONS
Personalized Preventive Plan for Marilyn Chavira - 7/1/2022  Medicare offers a range of preventive health benefits. Some of the tests and screenings are paid in full while other may be subject to a deductible, co-insurance, and/or copay. Some of these benefits include a comprehensive review of your medical history including lifestyle, illnesses that may run in your family, and various assessments and screenings as appropriate. After reviewing your medical record and screening and assessments performed today your provider may have ordered immunizations, labs, imaging, and/or referrals for you. A list of these orders (if applicable) as well as your Preventive Care list are included within your After Visit Summary for your review. Other Preventive Recommendations:    · A preventive eye exam performed by an eye specialist is recommended every 1-2 years to screen for glaucoma; cataracts, macular degeneration, and other eye disorders. · A preventive dental visit is recommended every 6 months. · Try to get at least 150 minutes of exercise per week or 10,000 steps per day on a pedometer . · Order or download the FREE \"Exercise & Physical Activity: Your Everyday Guide\" from [employer]. Call 2-477.281.5079 or search [employer] online. · You need 6605-8617 mg of calcium and 0281-1966 IU of vitamin D per day. It is possible to meet your calcium requirement with diet alone, but a vitamin D supplement is usually necessary to meet this goal.  · When exposed to the sun, use a sunscreen that protects against both UVA and UVB radiation with an SPF of 30 or greater. Reapply every 2 to 3 hours or after sweating, drying off with a towel, or swimming. · Always wear a seat belt when traveling in a car. Always wear a helmet when riding a bicycle or motorcycle. Heart-Healthy Diet   Sodium, Fat, and Cholesterol Controlled Diet       What Is a Heart Healthy Diet?    A heart-healthy cholesterol, this type of cholesterol actually carries cholesterol away from your arteries and may, therefore, help lower your risk of having a heart attack. You want this level to be high (ideally greater than 60). It is a risk to have a level less than 40. You can raise this good cholesterol by eating olive oil, canola oil, avocados, or nuts. Exercise raises this level, too. Fat    Fat is calorie dense and packs a lot of calories into a small amount of food. Even though fats should be limited due to their high calorie content, not all fats are bad. In fact, some fats are quite healthful. Fat can be broken down into four main types. The good-for-you fats are:   Monounsaturated fat  found in oils such as olive and canola, avocados, and nuts and natural nut butters; can decrease cholesterol levels, while keeping levels of HDL cholesterol high   Polyunsaturated fat  found in oils such as safflower, sunflower, soybean, corn, and sesame; can decrease total cholesterol and LDL cholesterol   Omega-3 fatty acids  particularly those found in fatty fish (such as salmon, trout, tuna, mackerel, herring, and sardines); can decrease risk of arrhythmias, decrease triglyceride levels, and slightly lower blood pressure   The fats that you want to limit are:   Saturated fat  found in animal products, many fast foods, and a few vegetables; increases total blood cholesterol, including LDL levels   Animal fats that are saturated include: butter, lard, whole-milk dairy products, meat fat, and poultry skin   Vegetable fats that are saturated include: hydrogenated shortening, palm oil, coconut oil, cocoa butter   Hydrogenated or trans fat  found in margarine and vegetable shortening, most shelf stable snack foods, and fried foods; increases LDL and decreases HDL     It is generally recommended that you limit your total fat for the day to less than 30% of your total calories.  If you follow an 1800-calorie heart healthy diet, for example, this would mean 60 grams of fat or less per day. Saturated fat and trans fat in your diet raises your blood cholesterol the most, much more than dietary cholesterol does. For this reason, on a heart-healthy diet, less than 7% of your calories should come from saturated fat and ideally 0% from trans fat. On an 1800-calorie diet, this translates into less than 14 grams of saturated fat per day, leaving 46 grams of fat to come from mono- and polyunsaturated fats.    Food Choices on a Heart Healthy Diet   Food Category   Foods Recommended   Foods to Avoid   Grains   Breads and rolls without salted tops Most dry and cooked cereals Unsalted crackers and breadsticks Low-sodium or homemade breadcrumbs or stuffing All rice and pastas   Breads, rolls, and crackers with salted tops High-fat baked goods (eg, muffins, donuts, pastries) Quick breads, self-rising flour, and biscuit mixes Regular bread crumbs Instant hot cereals Commercially prepared rice, pasta, or stuffing mixes   Vegetables   Most fresh, frozen, and low-sodium canned vegetables Low-sodium and salt-free vegetable juices Canned vegetables if unsalted or rinsed   Regular canned vegetables and juices, including sauerkraut and pickled vegetables Frozen vegetables with sauces Commercially prepared potato and vegetable mixes   Fruits   Most fresh, frozen, and canned fruits All fruit juices   Fruits processed with salt or sodium   Milk   Nonfat or low-fat (1%) milk Nonfat or low-fat yogurt Cottage cheese, low-fat ricotta, cheeses labeled as low-fat and low-sodium   Whole milk Reduced-fat (2%) milk Malted and chocolate milk Full fat yogurt Most cheeses (unless low-fat and low salt) Buttermilk (no more than 1 cup per week)   Meats and Beans   Lean cuts of fresh or frozen beef, veal, lamb, or pork (look for the word loin) Fresh or frozen poultry without the skin Fresh or frozen fish and some shellfish Egg whites and egg substitutes (Limit whole eggs to three per week) Tofu Nuts or seeds (unsalted, dry-roasted), low-sodium peanut butter Dried peas, beans, and lentils   Any smoked, cured, salted, or canned meat, fish, or poultry (including rondon, chipped beef, cold cuts, hot dogs, sausages, sardines, and anchovies) Poultry skins Breaded and/or fried fish or meats Canned peas, beans, and lentils Salted nuts   Fats and Oils   Olive oil and canola oil Low-sodium, low-fat salad dressings and mayonnaise   Butter, margarine, coconut and palm oils, rondon fat   Snacks, Sweets, and Condiments   Low-sodium or unsalted versions of broths, soups, soy sauce, and condiments Pepper, herbs, and spices; vinegar, lemon, or lime juice Low-fat frozen desserts (yogurt, sherbet, fruit bars) Sugar, cocoa powder, honey, syrup, jam, and preserves Low-fat, trans-fat free cookies, cakes, and pies Inocencio and animal crackers, fig bars, vanesa snaps   High-fat desserts Broth, soups, gravies, and sauces, made from instant mixes or other high-sodium ingredients Salted snack foods Canned olives Meat tenderizers, seasoning salt, and most flavored vinegars   Beverages   Low-sodium carbonated beverages Tea and coffee in moderation Soy milk   Commercially softened water   Suggestions   Make whole grains, fruits, and vegetables the base of your diet. Choose heart-healthy fats such as canola, olive, and flaxseed oil, and foods high in heart-healthy fats, such as nuts, seeds, soybeans, tofu, and fish. Eat fish at least twice per week; the fish highest in omega-3 fatty acids and lowest in mercury include salmon, herring, mackerel, sardines, and canned chunk light tuna. If you eat fish less than twice per week or have high triglycerides, talk to your doctor about taking fish oil supplements. Read food labels.    For products low in fat and cholesterol, look for fat free, low-fat, cholesterol free, saturated fat free, and trans fat freeAlso scan the Nutrition Facts Label, which lists saturated fat, trans fat, and cholesterol amounts. For products low in sodium, look for sodium free, very low sodium, low sodium, no added salt, and unsalted   Skip the salt when cooking or at the table; if food needs more flavor, get creative and try out different herbs and spices. Garlic and onion also add substantial flavor to foods. Trim any visible fat off meat and poultry before cooking, and drain the fat off after garcia. Use cooking methods that require little or no added fat, such as grilling, boiling, baking, poaching, broiling, roasting, steaming, stir-frying, and sauting. Avoid fast food and convenience food. They tend to be high in saturated and trans fat and have a lot of added salt. Talk to a registered dietitian for individualized diet advice. Last Reviewed: March 2011 Crescencio Dixon MS, MPH, RD   Updated: 3/29/2011   ·     Keep Your Memory [place]       Many factors can affect your ability to remembera hectic lifestyle, aging, stress, chronic disease, and certain medicines. But, there are steps you can take to sharpen your mind and help preserve your memory. Challenge Your Brain   Regularly challenging your mind may help keeps it in top shape. Good mental exercises include:   Crossword puzzlesUse a dictionary if you need it; you will learn more that way. Brainteasers Try some! Crafts, such as wood working and sewing   Hobbies, such as gardening and building model airplanes   SocializingVisit old friends or join groups to meet new ones. Reading   Learning a new language   Taking a class, whether it be art history or lelia chi   TravelingExperience the food, history, and culture of your destination   Learning to use a computer   Going to museums, the theater, or thought-provoking movies   Changing things in your daily life, such as reversing your pattern in the grocery store or brushing your teeth using your nondominant hand   Use Memory Aids   There is no need to remember every detail on your own.  These memory aids can help:   Calendars and day planners   Electronic organizers to store all sorts of helpful informationThese devices can \"beep\" to remind you of appointments. A book of days to record birthdays, anniversaries, and other occasions that occur on the same date every year   Detailed \"to-do\" lists and strategically placed sticky notes   Quick \"study\" sessionsBefore a gathering, review who will be there so their names will be fresh in your mind. Establish routinesFor example, keep your keys, wallet, and umbrella in the same place all the time or take medicine with your 8:00 AM glass of juice   Live a Healthy Life   Many actions that will keep your body strong will do the same for your mind. For example:   Talk to Your Doctor About Herbs and Supplements    Malnutrition and vitamin deficiencies can impair your mental function. For example, vitamin B12 deficiency can cause a range of symptoms, including confusion. But, what if your nutritional needs are being met? Can herbs and supplements still offer a benefit? Researchers have investigated a range of natural remedies, such as ginkgo , ginseng , and the supplement phosphatidylserine (PS). So far, though, the evidence is inconsistent as to whether these products can improve memory or thinking. If you are interested in taking herbs and supplements, talk to your doctor first because they may interact with other medicines that you are taking. Exercise Regularly    Among the many benefits of regular exercise are increased blood flow to the brain and decreased risk of certain diseases that can interfere with memory function. One study found that even moderate exercise has a beneficial effect. Examples of \"moderate\" exercise include:   Playing 18 holes of golf once a week, without a cart   Playing tennis twice a week   Walking one mile per day   Manage Stress    It can be tough to remember what is important when your mind is cluttered.  Make time for relaxation. Choose activities that calm you down, and make it routine. Manage Chronic Conditions    Side effects of high blood pressure , diabetes, and heart disease can interfere with mental function. Many of the lifestyle steps discussed here can help manage these conditions. Strive to eat a healthy diet, exercise regularly, get stress under control, and follow your doctor's advice for your condition. Minimize Medications    Talk to your doctor about the medicines that you take. Some may be unnecessary. Also, healthy lifestyle habits may lower the need for certain drugs. Last Reviewed: April 2010 Riya Romero MD   Updated: 4/13/2010   ·     [facility] [address] [facility]       As we get older, changes in balance, gait, strength, vision, hearing, and cognition make even the most youthful senior more prone to accidents. Falls are one of the leading health risks for older people. This increased risk of falling is related to:   Aging process (eg, decreased muscle strength, slowed reflexes)   Higher incidence of chronic health problems (eg, arthritis, diabetes) that may limit mobility, agility or sensory awareness   Side effects of medicine (eg, dizziness, blurred vision)especially medicines like prescription pain medicines and drugs used to treat mental health conditions   Depending on the brittleness of your bones, the consequences of a fall can be serious and long lasting. Home Life   Research by the Association of Aging [facility]) shows that some home accidents among older adults can be prevented by making simple lifestyle changes and basic modifications and repairs to the home environment. Here are some lifestyle changes that experts recommend:   Have your hearing and vision checked regularly. Be sure to wear prescription glasses that are right for you. Speak to your doctor or pharmacist about the possible side effects of your medicines. A number of medicines can cause dizziness.    If you have problems with wood floors can be particularly slippery. Stairs should always have handrails and be carpeted or fitted with a non-skid tread. Is your telephone easily reachable. Is the cord safely tucked away? Room by Room   According to the Association of Aging, bathrooms and fernando are the two most potentially hazardous rooms in your home. In the Kitchen    Be sure your stove is in proper working order and always make sure burners and the oven are off before you go out or go to sleep. Keep pots on the back burners, turn handles away from the front of the stove, and keep stove clean and free of grease build-up. Kitchen ventilation systems and range exhausts should be working properly. Keep flammable objects such as towels and pot holders away from the cooking area except when in use. Make sure kitchen curtains are tied back. Move cords and appliances away from the sink and hot surfaces. If extension cords are needed, install wiring guides so they do not hang over the sink, range, or working areas. Look for coffee pots, kettles and toaster ovens with automatic shut-offs. Keep a mop handy in the kitchen so you can wipe up spills instantly. You should also have a small fire extinguisher. Arrange your kitchen with frequently used items on lower shelves to avoid the need to stand on a stepstool to reach them. Make sure countertops are well-lit to avoid injuries while cutting and preparing food. In the Bathroom    Use a non-slip mat or decals in the tub and shower, since wet, soapy tile or porcelain surfaces are extremely slippery. Make sure bathroom rugs are non-skid or tape them firmly to the floor. Bathtubs should have at least one, preferably two, grab bars, firmly attached to structural supports in the wall. (Do not use built-in soap holders or glass shower doors as grab bars.)    Tub seats fitted with non-slip material on the legs allow you to wash sitting down.  For people with limited mobility, bathtub transfer benches allow you to slide safely into the tub. Raised toilet seats and toilet safety rails are helpful for those with knee or hip problems. In the [facility]    Make sure you use a nightlight and that the area around your bed is clear of potential obstacles. Be careful with electric blankets and never go to sleep with a heating pad, which can cause serious burns even if on a low setting. Use fire-resistant mattress covers and pillows, and NEVER smoke in bed. Keep a phone next to the bed that is programmed to dial 911 at the push of a button. If you have a chronic condition, you may want to sign on with an automatic call-in service. Typically the system includes a small pendant that connects directly to an emergency medical voice-response system. You should also make arrangements to stay in contact with someonefriend, neighbor, family memberon a regular schedule. Fire Prevention   According to the [employer] (Smoke Alarms for Every) [address] [place] [address], senior citizens are one of the two highest risk groups for death and serious injuries due to residential fires. When cooking, wear short-sleeved items, never a bulky long-sleeved robe. The [facility]'s Safety Checklist for Older Consumers emphasizes the importance of checking basements, garages, workshops and storage areas for fire hazards, such as volatile liquids, piles of old rags or clothing and overloaded circuits. Never smoke in bed or when lying down on a couch or recliner chair. Small portable electric or kerosene heaters are responsible for many home fires and should be used cautiously if at all. If you do use one, be sure to keep them away from flammable materials. In case of fire, make sure you have a pre-established emergency exit plan. Have a professional check your fireplace and other fuel-burning appliances yearly.     Helping Hands   Baby boomers entering the moyer years will continue to see the development of new products to help older adults live safely and independently in spite of age-related changes. Making Life More Livable  , by Anabelle Caraballo, lists over 1,000 products for \"living well in the mature years,\" such as bathing and mobility aids, household security devices, ergonomically designed knives and peelers, and faucet valves and knobs for temperature control. Medical supply stores and organizations are good sources of information about products that improve your quality of life and insure your safety. Last Reviewed: November 2009 Karen Paz MD   Updated: 3/7/2011     ·        Advance Care Planning: Care Instructions  Your Care Instructions     It can be hard to live with an illness that cannot be cured. But if your health is getting worse, you may want to make decisions about end-of-life care. Planning for the end of your life does not mean that you are giving up. It is a way to make sure that your wishes are met. Clearly stating your wishes can make it easier for your loved ones. Making plans while you are still able may alsoease your mind and make your final days less stressful and more meaningful. Follow-up care is a key part of your treatment and safety. Be sure to make and go to all appointments, and call your doctor if you are having problems. It's also a good idea to know your test results and keep alist of the medicines you take. What can you do to plan for the end of life? You can bring these issues up with your doctor. You do not need to wait until your doctor starts the conversation. You might start with, \"What makes life worth living for me is. Phylicia Red Phylicia Red \" And then follow it with, \"I would not be willing to live with . Phylicia Red Phylicia Red Phylicia Red \" When you complete this sentence it helps your doctor understand your wishes. Talk openly and honestly with your doctor. This is the best way to understand the decisions you will need to make as your health changes.  Know that you can always change your treatments at the end of your life if you become unable to speak for yourself. Living murphy tell doctors to use or not use treatments that would keep you alive. You must have one or two witnesses or a notary present when you sign this form. A living will may be called something else in your state. Consider a medical power of . This form allows you to name a person to make decisions about your care if you are not able to. Most people ask a close friend or family member. Talk to this person about the kinds of treatments you want and those that you do not want. Make sure this person understands your wishes. A medical power of  may be called something else in your state. These legal papers are simple to change. Tell your doctor what you want to change, and ask him or her to make a note in your medical file. Give yourfamily updated copies of the papers. Where can you learn more? Go to https://in[employer]uf[employer]. org and sign in to your [employer] account. Enter P184 in the [employer] box to learn more about \"Advance Care Planning: Care Instructions. \"     If you do not have an account, please click on the \"Sign Up Now\" link. Current as of: October 18, 2021               Content Version: 13.3  © 2006-2022 Healthwise, Incorporated. Care instructions adapted under license by [facility] ([facility]). If you have questions about a medical condition or this instruction, always ask your healthcare professional. [place] [address] any warranty or liability for your use of this information. ·        Learning About Living Perroy  What is a living will? A living will, also called a declaration, is a legal form. It tells your family and your doctor your wishes when you can't speak for yourself. It's used by the health professionals who will treat you as you near the end of your life or ifyou get seriously hurt or ill.   If you put your wishes in writing, your loved ones and others will know what kind of care you want. They won't need to guess. This can ease your mind and behelpful to others. And you can change or cancel your living will at any time. A living will is not the same as an estate or property will. An estate willexplains what you want to happen with your money and property after you die. How do you use it? Keep these facts in mind about how a living will is used. Your living will is used only if you can't speak or make decisions for yourself. Most often, one or more doctors must certify that you can't speak or decide for yourself before your living will takes effect. If you get better and can speak for yourself again, you can accept or refuse any treatment. It doesn't matter what you said in your living will. Some states may limit your right to refuse treatment in certain cases. For example, you may need to clearly state in your living will that you don't want artificial hydration and nutrition, such as being fed through a tube. Is a living will a legal document? A living will is a legal document. Each state has its own laws about livingwills. And a living will may be called something else in your state. Here are some things to know about living murphy. You don't need an  to complete a living will. But legal advice can be helpful if your state's laws are unclear. It can also help if your health history is complicated or your family can't agree on what should be in your living will. You can change your living will at any time. Some people find that their wishes about end-of-life care change as their health changes. If you make big changes to your living will, complete a new form. If you move to another state, make sure that your living will is legal in the state where you now live. In most cases, doctors will respect your wishes even if you have a form from a different state. You might use a universal form that has been approved by many states.  This kind of form can sometimes be filled out and stored online. Your digital copy will then be available wherever you have a connection to the internet. The doctors and nurses who need to treat you can find it right away. Your state may offer an online registry. This is another place where you can store your living will online. It's a good idea to get your living will notarized. This means using a person called a [employer] to watch two people sign, or witness, your living will. What should you know when you create a living will? Here are some questions to ask yourself as you make your living will. Do you know enough about life support methods that might be used? If not, talk to your doctor so you know what might be done if you can't breathe on your own, your heart stops, or you can't swallow. What things would you still want to be able to do after you receive life-support methods? Would you want to be able to walk? To speak? To eat on your own? To live without the help of machines? Do you want certain Jew practices performed if you become very ill? If you have a choice, where do you want to be cared for? In your home? At a hospital or nursing home? If you have a choice at the end of your life, where would you prefer to die? At home? In a hospital or nursing home? Somewhere else? Would you prefer to be buried or cremated? Do you want your organs to be donated after you die? What should you do with your living will? Make sure that your family members and your health care agent have copies of your living will (also called a declaration). Give your doctor a copy of your living will. Ask to have it kept as part of your medical record. If you have more than one doctor, make sure that each one has a copy. Put a copy of your living will where it can be easily found. For example, some people may put a copy on their refrigerator door.  If you are using a digital copy, be sure your doctor, family members, and health care agent know how to find and access it. Where can you learn more? Go to [email or web address] and sign in to your [employer] account. Enter M960 in the [facility] box to learn more about \"Learning About Living [place]. \"     If you do not have an account, please click on the \"Sign Up Now\" link. Current as of: October 18, 2021               Content Version: 13.3  © 2006-2022 Healthwise, [employer]. Care instructions adapted under license by [facility] ([facility]). If you have questions about a medical condition or this instruction, always ask your healthcare professional. [address] any warranty or liability for your use of this information. ·        Learning About Medical Power of   What is a medical power of ? A medical power of , also called a durable power of  for health care, is one type of the legal forms called advance directives. It lets you name the person you want to make treatment decisions for you if you can't speak or decide for yourself. The person you choose is called your health care agent. This person is also called a health care proxy or health care surrogate. A medical power of  may be called something else in your state. How do you choose a health care agent? Choose your health care agent carefully. This person may or may not be a familymember. Talk to the person before you make your final decision. Make sure he or she iscomfortable with this responsibility. It's a good idea to choose someone who:  Is at least 25years old. Knows you well and understands what makes life meaningful for you. Understands your Yarsanism and moral values. Will do what you want, not what he or she wants. Will be able to make difficult choices at a stressful time. Will be able to refuse or stop treatment, if that is what you would want, even if you could die.   Will be firm and confident with health professionals if [employer]. Care instructions adapted under license by [facility] ([facility]). If you have questions about a medical condition or this instruction, always ask your healthcare professional. [place] [id number] any warranty or liability for your use of this information.     ·

## 2022-07-01 NOTE — PROGRESS NOTES
[facility]) Physicians   Internal Medicine     2022  Anirudh Delgado : 1945 Sex: female  Age:74 y.o. Chief Complaint   Patient presents with    Fall     fell this morning before appt        HPI:   Patient presents to office for evaluation of the following medical concerns. HPI:   Patient presents to office for evaluation of the following medical concerns. -  had a fall leaving house to come to appointment. States hit knee. States some aches. Declines work up at present     -  has been having leg weakness b/l and aches in b/l legs since (3/2022) , now left more than right. States also with swelling. States aches are worse at night. States having issues with getting up, no strength in legs. States some numbness.  has been taking ibuprofen multiple time a day. 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 Has seen neurology at [facility] feel diabetic amyotrophy, had EMG (2022). Started on gabapentin on 300mg three times a day, states taking twice a day due to side effects    -  has been diagnosed with macular degeneration.  has seen retinal specialist and receiving injections. Possible Unsure if Retinopathy.    -  has DM. Following with endocrinology for Diabetes. States monitors sugars at home. States on 70/30 insulin increased to 30 units in am and 15 units in pm. Occasionally will take 15 units at night if suagr is lower. On metformin. Stopped Tradjenta. Adjusted novalog Slide scale if over 200.  has had episodes of hypoglycemia since last visit. Not always compliant with diet. Last a1c was 8.1 (3/2022)     -  has hyperlipidemia. Stopped atorvastatin for high intensity statin. Trying to watch diet. No reported side effects.  Frequently forgets to take at bedtime.    -  has high blood pressure. [facility] [place] does randomly check blood pressure at home. States 130/80's. On lisinopril and HCTZ and metoprolol. States has some fatigue. Was seen in ER (5/22) -blood pressure at home evaluated with labs chest x-ray EKG States had hydralazine added and was recently increased to 25mg three times. by another pcp in [address] [place] [address] (6/2022)     - States right shoulder pain has improved. - Labs show kidney dysfunction, last lab (12/2021) - still present      - lab work from 12/10/2021 reviewed with patient 7/1/2022    Health Maintenance   - immunizations:   Influenza Vaccination - (10/20/2016) , (2017), (2018), (2019), (2020)   Pneumonia Vaccination - (2018) - prevnar, (12/2019) - pneumococcal 23   Zoster/Shingles Vaccine  Tetanus Vaccination  covid (2/2/2021) #1, (2/23/2021) #2, (9/28/2021) #3  - pfizer     - Screenings:   Bone Density Scan - (2012) - normal  Pelvic/Pap Exam - (2014), (2015) - gyn  Mammogram - (2015) - gyn    Colonoscopy - declines  FIT (8/2019) - negative, (4/2021) - order     Couseled on Home Safety  - (6/9/2017)    Dilated Eye Exam - (12/2017) - Wet AMD, diabetes, cataract, (5/2018) - no Retinopathy, wet AMD (11/2018)    Mri lumbar (5/2022) - Multilevel lumbar degenerative disc disease and facet joint   arthrosis. Minimal retrolisthesis at the L2-L3 and L3-L4 levels. 2.  Multilevel central canal stenosis most pronounced at the L3-L4 levels. 3.  Multilevel degenerative neural foraminal narrowing, most pronounced   the L5-S1 level. ROS:  Review of Systems   Constitutional: Negative for appetite change, chills, fever and unexpected weight change. HENT: Negative for congestion, rhinorrhea and sore throat. Eyes: Negative for pain and visual disturbance. Respiratory: Negative for cough, chest tightness and shortness of breath. Cardiovascular: Negative for chest pain and palpitations. Gastrointestinal: Positive for diarrhea.  Negative for abdominal pain, blood in stool, constipation, nausea and vomiting. Genitourinary: Negative for difficulty urinating, dysuria, frequency, pelvic pain, urgency and vaginal bleeding. Musculoskeletal: Negative for arthralgias and myalgias. Skin: Negative for rash. Neurological: Negative for dizziness, syncope, weakness, light-headedness, numbness and headaches. Hematological: Negative for adenopathy. Psychiatric/Behavioral: Negative for suicidal ideas. The patient is not nervous/anxious. Current Outpatient Medications:     gabapentin (NEURONTIN) 300 MG capsule, Take 300 mg by mouth 3 times daily. , Disp: , Rfl:     metoprolol succinate (TOPROL XL) 50 MG extended release tablet, Take 1 tablet by mouth daily, Disp: 90 tablet, Rfl: 0    atorvastatin (LIPITOR) 40 MG tablet, Take 1 tablet by mouth daily, Disp: 90 tablet, Rfl: 0    hydrALAZINE (APRESOLINE) 25 MG tablet, Take 25 mg by mouth 3 times daily, Disp: , Rfl:     metFORMIN (GLUCOPHAGE) 500 MG tablet, Take 1 tablet by mouth 2 times daily (with meals), Disp: 180 tablet, Rfl: 1    insulin 70-30 (NOVOLIN 70/30) (70-30) 100 UNIT per ML injection vial, 30 units with breakfast and 20 units before dinner, Disp: 40 mL, Rfl: 3    insulin aspart (NOVOLOG) 100 UNIT/ML injection vial, Take for correction of B unit for every 30 mg/dl if the BG is >140 mg/dl (max of 15 units/day), Disp: 10 mL, Rfl: 3    furosemide (LASIX) 20 MG tablet, Take 1 tablet by mouth daily, Disp: 30 tablet, Rfl: 2    lisinopril-hydroCHLOROthiazide (PRINZIDE;ZESTORETIC) 20-12.5 MG per tablet, Take 1 tablet by mouth 2 times daily, Disp: 180 tablet, Rfl: 1    blood glucose test strips (ONETOUCH VERIO) strip, Use to test blood sugar 4 times a day., Disp: 200 each, Rfl: 5    Insulin Syringe-Needle U-100 (BD INSULIN SYRINGE U/F) 31G X 5/16\" 0.5 ML MISC, USE AS DIRECTED TO TAKE INSULIN 3 TIMES A DAY, Disp: 100 each, Rfl: 3    Continuous Blood Gluc  (FREESTYLE FRANNY 2 READER) ALEXIS, Use to swipe the sensor at least every 6 hours, Disp: 1 each, Rfl: 0    Lancets MISC, Use to check BG 1-2x/day, Disp: 200 each, Rfl: 3    Multiple Vitamins-Minerals (PRESERVISION AREDS 2 PO), Take by mouth 2 times daily, Disp: , Rfl:     vitamin D (CHOLECALCIFEROL) 1000 UNIT TABS tablet, Take 1,000 Units by mouth daily, Disp: , Rfl:     Allergies   Allergen Reactions    Penicillins Hives and Other (See Comments)     EDEMA EXTREMITIES       Past Medical History:   Diagnosis Date    Bilateral nonexudative age-related macular degeneration 2019    HLD (hyperlipidemia)     HTN (hypertension)     Stage 2 chronic kidney disease 2019    [facility]type 2 diabetes mellitus) [facility]     Uterine cancer ([facility]     s/p surgery    Vitamin D deficiency 2019       Past Surgical History:   Procedure Laterality Date    HYSTERECTOMY, TOTAL ABDOMINAL (CERVIX REMOVED)      TONSILLECTOMY         Family History   Problem Relation Age of Onset    Heart Disease Mother         late onset,  in 80's   [facility] Coronary Art Dis Mother     Heart Disease Father         late onset,  in 80's    Dementia Father     Coronary Art Dis Father     No Known Problems Son     No Known Problems Son     No Known Problems Son        Social History     Socioeconomic History    Marital status:      Spouse name: Not on file    Number of children: 3    Years of education: Not on file    Highest education level: Not on file   Occupational History    Occupation:    Tobacco Use    Smoking status: Never Smoker    Smokeless tobacco: Never Used   Substance and Sexual Activity    Alcohol use: No     Comment: Social very rare    Drug use: No    Sexual activity: Never   Other Topics Concern    Not on file   Social History Narrative    Lives in a house in [facility]     Social Determinants of Health     Financial Resource Strain: Low Risk     Difficulty of Paying Living Expenses: Not hard at all   Food Insecurity: No Food Insecurity    Worried About Running Out of Food in the Last Year: Never true    [place] of Food in the Last Year: Never true   Transportation Needs:     Lack of Transportation (Medical): Not on file    Lack of Transportation (Non-Medical): Not on file   Physical Activity: Insufficiently Active    Days of Exercise per Week: 3 days    Minutes of Exercise per Session: 30 min   Stress:     Feeling of Stress : Not on file   Social Connections:     Frequency of Communication with Friends and Family: Not on file    Frequency of Social Gatherings with Friends and Family: Not on file    Attends Confucianist Services: Not on file    Active Member of Clubs or Organizations: Not on file    Attends Club or Organization Meetings: Not on file    Marital Status: Not on file   Intimate Partner Violence:     Fear of Current or Ex-Partner: Not on file    Emotionally Abused: Not on file    Physically Abused: Not on file    Sexually Abused: Not on file   Housing Stability:     Unable to Pay for Housing in the Last Year: Not on file    Number of [place] in the Last Year: Not on file    Unstable Housing in the Last Year: Not on file       Vitals:    07/01/22 0915 07/01/22 0919   BP: (!) 148/72 (!) 146/70   Pulse: 88    Temp: 98 °F (36.7 °C)    SpO2: 99%    Height: 5' 2\" (1.575 m)        Exam:  Physical Exam  Vitals reviewed. Constitutional:       Appearance: She is well-developed. HENT:      Head: Normocephalic and atraumatic. Right Ear: External ear normal.      Left Ear: External ear normal.   Eyes:      Pupils: Pupils are equal, round, and reactive to light. Neck:      Thyroid: No thyromegaly. Cardiovascular:      Rate and Rhythm: Normal rate and regular rhythm. Heart sounds: Normal heart sounds. No murmur heard. Pulmonary:      Effort: Pulmonary effort is normal.      Breath sounds: Normal breath sounds. No wheezing.    Abdominal:      General: Bowel sounds are normal. There is no distension. Palpations: Abdomen is soft. Tenderness: There is no abdominal tenderness. There is no guarding or rebound. Musculoskeletal:         General: Normal range of motion. Cervical back: Normal range of motion and neck supple. Right lower leg: Edema present. Left lower leg: Edema present. Comments: Tenderness to thigh b/l   weakness to resistance of LE b/l    Lymphadenopathy:      Cervical: No cervical adenopathy. Skin:     General: Skin is warm and dry. Neurological:      Mental Status: She is alert and oriented to person, place, and time. Cranial Nerves: No cranial nerve deficit.    Psychiatric:         Judgment: Judgment normal.         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  - had MRI lumbar spine (5/2022)   - had EMG   - [facility] pending   - started on gabapentin   - had home PT   - needs outpatient PT     Type 2 diabetes mellitus with stage 2 chronic kidney disease, with long-term current use of insulin ([address]  - following with endocrinology   - monitor sugar at home  - now on 70/30 insulin 30 units in am and 15 units in pm  - Humalog with meals and slide scale add to that so that any sugar > 200   - on metformin   - monitor for hypoglycemia and action plan  - Last A1c was 8.1 (3/2022)  - non compliant with diet  - established with endocrinology   - discussed to ask endocrinology about invokana for DM kidney disease risk reduction and cardiovascular risk reduction     follows with optho (2/21) wet AMD OS s/p avastin, nonexudative macular OD  on statin  on asa  On ACE     Benign essential hypertension  - monitor blood pressure at home  - watch diet   - declines amlodipine   - on metoprolol  - on lisinopril-HCTZ  - on hydralazine increased to 25mg three times a day   - suggested nephrology - declines   - borderline elevated today 7/1/2022    Mixed hyperlipidemia  - watch diet  - on atorvastatin 40mg - high intensity statin  - follow labs   - last lab (12/2021) - improved   - had held statin to see if leg weakness would improve - no change - recommend restart of statin     Localized edema  - as above     Vitamin D deficiency  - on otc supplement  - follow labs     Endometrial intraepithelial neoplasia (EIN)  -s/p surgery  - follows with gyn    Stage 3a chronic kidney disease ([facility]  - Noted on labs   - possibly multifactorial (HTN, DM)  - on ACE   - may need to consider nephro referral - declines   - monitor labs - last (6/2022)       Return in about 3 months (around 10/1/2022) for check up and review. No orders of the defined types were placed in this encounter.     Requested Prescriptions     Signed Prescriptions Disp Refills    metoprolol succinate (TOPROL XL) 50 MG extended release tablet 90 tablet 0     Sig: Take 1 tablet by mouth daily    atorvastatin (LIPITOR) 40 MG tablet 90 tablet 0     Sig: Take 1 tablet by mouth daily       Hunter Pritchard MD  7/1/2022  9:50 AM

## 2022-07-03 ENCOUNTER — TELEPHONE (OUTPATIENT)
Dept: FAMILY MEDICINE CLINIC | Age: 77
End: 2022-07-03

## 2022-07-03 NOTE — TELEPHONE ENCOUNTER
Please let the patient know that arterial study of the lower extremities per radiology report suggested    Major vessels of the right and left leg appeared normal    There was some evidence for decreased blood flow to the feet right side greater than left side    May wish to consider vascular referral    Thanks

## 2022-07-08 DIAGNOSIS — Z79.4 TYPE 2 DIABETES MELLITUS WITHOUT COMPLICATION, WITH LONG-TERM CURRENT USE OF INSULIN (HCC): ICD-10-CM

## 2022-07-08 DIAGNOSIS — E11.9 TYPE 2 DIABETES MELLITUS WITHOUT COMPLICATION, WITH LONG-TERM CURRENT USE OF INSULIN (HCC): ICD-10-CM

## 2022-07-08 RX ORDER — BLOOD SUGAR DIAGNOSTIC
STRIP MISCELLANEOUS
Qty: 100 EACH | Refills: 3 | Status: SHIPPED | OUTPATIENT
Start: 2022-07-08

## 2022-07-26 ENCOUNTER — OFFICE VISIT (OUTPATIENT)
Dept: ENDOCRINOLOGY | Age: 77
End: 2022-07-26
Payer: MEDICARE

## 2022-07-26 VITALS — HEART RATE: 82 BPM | DIASTOLIC BLOOD PRESSURE: 68 MMHG | SYSTOLIC BLOOD PRESSURE: 133 MMHG

## 2022-07-26 DIAGNOSIS — E55.9 VITAMIN D DEFICIENCY: ICD-10-CM

## 2022-07-26 DIAGNOSIS — Z79.4 TYPE 2 DIABETES MELLITUS WITH STAGE 2 CHRONIC KIDNEY DISEASE, WITH LONG-TERM CURRENT USE OF INSULIN (HCC): ICD-10-CM

## 2022-07-26 DIAGNOSIS — E11.22 TYPE 2 DIABETES MELLITUS WITH STAGE 2 CHRONIC KIDNEY DISEASE, WITH LONG-TERM CURRENT USE OF INSULIN (HCC): ICD-10-CM

## 2022-07-26 DIAGNOSIS — N18.2 TYPE 2 DIABETES MELLITUS WITH STAGE 2 CHRONIC KIDNEY DISEASE, WITH LONG-TERM CURRENT USE OF INSULIN (HCC): ICD-10-CM

## 2022-07-26 DIAGNOSIS — I10 ESSENTIAL HYPERTENSION: ICD-10-CM

## 2022-07-26 DIAGNOSIS — E11.65 UNCONTROLLED TYPE 2 DIABETES MELLITUS WITH HYPERGLYCEMIA (HCC): Primary | ICD-10-CM

## 2022-07-26 DIAGNOSIS — E78.2 MIXED HYPERLIPIDEMIA: ICD-10-CM

## 2022-07-26 LAB — HBA1C MFR BLD: 7.7 %

## 2022-07-26 PROCEDURE — 1123F ACP DISCUSS/DSCN MKR DOCD: CPT | Performed by: CLINICAL NURSE SPECIALIST

## 2022-07-26 PROCEDURE — 83036 HEMOGLOBIN GLYCOSYLATED A1C: CPT | Performed by: CLINICAL NURSE SPECIALIST

## 2022-07-26 PROCEDURE — 3051F HG A1C>EQUAL 7.0%<8.0%: CPT | Performed by: CLINICAL NURSE SPECIALIST

## 2022-07-26 PROCEDURE — 99214 OFFICE O/P EST MOD 30 MIN: CPT | Performed by: CLINICAL NURSE SPECIALIST

## 2022-07-26 NOTE — PROGRESS NOTES
[address] [facility] Department of Endocrinology Diabetes and Metabolism   [address] [facility] [address]   Phone: [phone]  Fax: [phone]    Date of Service: 7/26/2022    Primary Care Physician: Nikia Braun MD  Referring physician: No ref. provider found  Provider: MARKEL Noland     Reason for the visit:  Type 2 DM       History of Present Illness: The history is provided by the patient. No  was used. Accuracy of the patient data is excellent. Jeff Chiang is a very pleasant 68 y.o. female seen today for diabetes management     Jeff Chiang was diagnosed with diabetes in 1995 and currently on Metformin 500 mg BID, Novolin 70/30 30 units in AM and 15 units in PM,  Novolog mod dose ISS. She holds her 70/30  If BS is < 100.            The patient has been checking blood sugar 2-3  times a day and readings are mostly at goal  Recently diagnosed with diabetic amyotrophy  Following with [facility] neurology     Most recent A1c results summarized below  Lab Results   Component Value Date/Time    LABA1C 7.7 07/26/2022 03:32 PM    LABA1C 9.0 04/26/2022 04:45 PM    LABA1C 8.1 03/15/2022 09:44 AM       Patient has had infrequent  hypoglycemic episodes usually before dinner    The patient has been mindful of what has been eating and is following a diabetes diet    I reviewed current medications and the patient has no issues with diabetes medications  Jeff Chiang is up to date with eye exam and denied any history of diabetic retinopathy   Last eye exam was 11/2021, no h/o diabetic retinopathy  The patient performs own feet care  Microvascular complications:  No Retinopathy, stage 3 CKD  or Neuropathy   Macrovascular complications: no CAD, PVD, or Stroke  PAST MEDICAL HISTORY   Past Medical History:   Diagnosis Date    Bilateral nonexudative age-related macular degeneration 5/9/2019    HLD (hyperlipidemia)     HTN (hypertension)     Stage 2 chronic Subjective   Chief Complaint   Patient presents with   • Heartburn     Devaughn Aranda is a 56 y.o. male.   You have chosen to receive care through a telehealth visit.  Do you consent to use a video/audio connection for your medical care today? Yes    He needs a refill on Nexium.  He has a history of GERD and has been doing well on Nexium.  He gets return of symptoms if he misses a dose.     Heartburn  He reports no abdominal pain, no belching, no chest pain, no choking, no coughing, no dysphagia, no early satiety, no globus sensation, no heartburn, no hoarse voice, no nausea, no sore throat or no wheezing. This is a chronic problem. The current episode started more than 1 year ago. The problem occurs rarely. The problem has been resolved. The symptoms are aggravated by certain foods. There are no known risk factors. He has tried a PPI for the symptoms. The treatment provided significant relief.      Past Medical History:   Diagnosis Date   • Coronary artery disease    • DVT (deep venous thrombosis) (CMS/[facility])    • GERD (gastroesophageal reflux disease)    • Hyperlipidemia    • Kidney stones    • Myocardial infarction (CMS/[facility])      Past Surgical History:   Procedure Laterality Date   • CARDIAC CATHETERIZATION     • CORONARY STENT PLACEMENT     • LEG SURGERY       No Known Allergies  Social History     Socioeconomic History   • Marital status:      Spouse name: Not on file   • Number of children: Not on file   • Years of education: Not on file   • Highest education level: Not on file   Tobacco Use   • Smoking status: Former Smoker   • Smokeless tobacco: Never Used   Substance and Sexual Activity   • Alcohol use: Yes     Frequency: Never   • Drug use: No   • Sexual activity: Defer     Social History     Tobacco Use   Smoking Status Former Smoker   Smokeless Tobacco Never Used       family history includes Lymphoma in his mother.  Current Outpatient Medications on File Prior to Visit   Medication Sig Dispense  Refill   • aspirin (ASPIR-LOW) 81 MG EC tablet ASPIR-LOW 81 MG TBEC     • atorvastatin (LIPITOR) 80 MG tablet TAKE ONE TABLET BY MOUTH EVERY NIGHT AT BEDTIME 90 tablet 2   • carvedilol (COREG) 3.125 MG tablet Take 1 tablet by mouth 2 (Two) Times a Day. 180 tablet 3   • lisinopril (PRINIVIL,ZESTRIL) 5 MG tablet Take 1 tablet by mouth Daily. 90 tablet 3   • [DISCONTINUED] esomeprazole (nexIUM) 40 MG capsule TAKE ONE CAPSULE BY MOUTH DAILY 90 capsule 0     No current facility-administered medications on file prior to visit.      Patient Active Problem List   Diagnosis   • Coronary artery disease   • Hyperlipidemia   • Myocardial infarction (CMS/HCC)       The following portions of the patient's history were reviewed and updated as appropriate: allergies, current medications, past family history, past medical history, past social history, past surgical history and problem list.    Review of Systems   Constitutional: Negative for chills and fever.   HENT: Negative for hoarse voice, sinus pressure, sore throat and swollen glands.    Eyes: Negative for blurred vision.   Respiratory: Negative for cough, choking, shortness of breath and wheezing.    Cardiovascular: Negative for chest pain and palpitations.   Gastrointestinal: Negative for abdominal pain, dysphagia and nausea.   Endocrine: Negative for polyuria.   Genitourinary: Negative for difficulty urinating.   Skin: Negative for rash.   Neurological: Negative for dizziness, seizures and headache.   Hematological: Negative for adenopathy.   Psychiatric/Behavioral: Negative for depressed mood.       Objective   There were no vitals taken for this visit.  Physical Exam  Constitutional:       Appearance: Normal appearance.   Pulmonary:      Effort: Pulmonary effort is normal.   Neurological:      Mental Status: He is alert. Mental status is at baseline.   Psychiatric:         Mood and Affect: Mood normal.         Lab on 11/27/2020   Component Date Value Ref Range Status   •  kidney disease 2019    T2DM (type 2 diabetes mellitus) ([facility]     Uterine cancer ([facility]     s/p surgery    Vitamin D deficiency 2019       PAST SURGICAL HISTORY   Past Surgical History:   Procedure Laterality Date    HYSTERECTOMY, TOTAL ABDOMINAL (CERVIX REMOVED)      TONSILLECTOMY         SOCIAL HISTORY   Tobacco:   reports that she has never smoked. She has never used smokeless tobacco.  Alcohol:   reports no history of alcohol use. Drugs:   reports no history of drug use. FAMILY HISTORY   Family History   Problem Relation Age of Onset    Heart Disease Mother         late onset,  in 80's    Coronary Art Dis Mother     Heart Disease Father         late onset,  in 80's    Dementia Father     Coronary Art Dis Father     No Known Problems Son     No Known Problems Son     No Known Problems Son        ALLERGIES AND DRUG REACTIONS   Allergies   Allergen Reactions    Penicillins Hives and Other (See Comments)     EDEMA EXTREMITIES       CURRENT MEDICATIONS   Current Outpatient Medications   Medication Sig Dispense Refill    insulin 70-30 (NOVOLIN 70/30) (70-30) 100 UNIT per ML injection vial 26 units with breakfast and 15 units before dinner 40 mL 3    Insulin Syringe-Needle U-100 (BD INSULIN SYRINGE U/F) 31G X 5/16\" 0.5 ML MISC USE AS DIRECTED TO TAKE INSULIN 3 TIMES A  each 3    gabapentin (NEURONTIN) 300 MG capsule Take 300 mg by mouth 3 times daily.       metoprolol succinate (TOPROL XL) 50 MG extended release tablet Take 1 tablet by mouth daily 90 tablet 0    atorvastatin (LIPITOR) 40 MG tablet Take 1 tablet by mouth daily 90 tablet 0    hydrALAZINE (APRESOLINE) 25 MG tablet Take 25 mg by mouth 3 times daily      metFORMIN (GLUCOPHAGE) 500 MG tablet Take 1 tablet by mouth 2 times daily (with meals) 180 tablet 1    insulin aspart (NOVOLOG) 100 UNIT/ML injection vial Take for correction of B unit for every 30 mg/dl if the BG is >140 mg/dl (max of 15 units/day) 10 mL 3    furosemide (LASIX) 20 MG tablet Take 1 tablet by mouth daily 30 tablet 2    lisinopril-hydroCHLOROthiazide (PRINZIDE;ZESTORETIC) 20-12.5 MG per tablet Take 1 tablet by mouth 2 times daily 180 tablet 1    blood glucose test strips (ONETOUCH VERIO) strip Use to test blood sugar 4 times a day. 200 each 5    Continuous Blood Gluc  (FREESTYLE FRANNY 2 READER) ALEXIS Use to swipe the sensor at least every 6 hours 1 each 0    Lancets MISC Use to check BG 1-2x/day 200 each 3    Multiple Vitamins-Minerals (PRESERVISION AREDS 2 PO) Take by mouth 2 times daily      vitamin D (CHOLECALCIFEROL) 1000 UNIT TABS tablet Take 1,000 Units by mouth daily       No current facility-administered medications for this visit. Review of Systems  Constitutional: No fever, no chills, no diaphoresis, no generalized weakness. HEENT: No blurred vision, No sore throat, no ear pain, no hair loss  Neck: denied any neck swelling, difficulty swallowing,   Cardio-pulmonary: No CP, SOB or palpitation, No orthopnea or PND. No cough or wheezing. GI: No N/V/D, no constipation, No abdominal pain, no melena or hematochezia   : Denied any dysuria, hematuria, flank pain, discharge, or incontinence. Skin: denied any rash, ulcer, Hirsute, or hyperpigmentation. MSK: denied any joint deformity, joint pain/swelling, muscle pain, or back pain. Neuro: no numbness, no tingling, no weakness, _    OBJECTIVE    /68   Pulse 82   BP Readings from Last 4 Encounters:   07/26/22 133/68   07/01/22 (!) 146/70   07/01/22 136/68   06/01/22 (!) 166/61     Wt Readings from Last 6 Encounters:   06/01/22 190 lb (86.2 kg)   04/26/22 191 lb (86.6 kg)   03/18/22 198 lb 3.2 oz (89.9 kg)   03/15/22 197 lb (89.4 kg)   12/14/21 202 lb (91.6 kg)   04/05/21 210 lb 12.8 oz (95.6 kg)       Physical examination:  General: awake alert, oriented x3, no abnormal position or movements.   HEENT: normocephalic non-traumatic, no exophthalmos   Neck: supple, no LN enlargement, no thyromegaly, no WBC 11/27/2020 5.92  3.40 - 10.80 10*3/mm3 Final   • RBC 11/27/2020 5.18  4.14 - 5.80 10*6/mm3 Final   • Hemoglobin 11/27/2020 15.3  13.0 - 17.7 g/dL Final   • Hematocrit 11/27/2020 46.8  37.5 - 51.0 % Final   • MCV 11/27/2020 90.3  79.0 - 97.0 fL Final   • MCH 11/27/2020 29.5  26.6 - 33.0 pg Final   • MCHC 11/27/2020 32.7  31.5 - 35.7 g/dL Final   • RDW 11/27/2020 12.8  12.3 - 15.4 % Final   • RDW-SD 11/27/2020 41.7  37.0 - 54.0 fl Final   • MPV 11/27/2020 9.5  6.0 - 12.0 fL Final   • Platelets 11/27/2020 217  140 - 450 10*3/mm3 Final   • Neutrophil % 11/27/2020 52.0  42.7 - 76.0 % Final   • Lymphocyte % 11/27/2020 37.2  19.6 - 45.3 % Final   • Monocyte % 11/27/2020 8.1  5.0 - 12.0 % Final   • Eosinophil % 11/27/2020 1.9  0.3 - 6.2 % Final   • Basophil % 11/27/2020 0.5  0.0 - 1.5 % Final   • Immature Grans % 11/27/2020 0.3  0.0 - 0.5 % Final   • Neutrophils, Absolute 11/27/2020 3.08  1.70 - 7.00 10*3/mm3 Final   • Lymphocytes, Absolute 11/27/2020 2.20  0.70 - 3.10 10*3/mm3 Final   • Monocytes, Absolute 11/27/2020 0.48  0.10 - 0.90 10*3/mm3 Final   • Eosinophils, Absolute 11/27/2020 0.11  0.00 - 0.40 10*3/mm3 Final   • Basophils, Absolute 11/27/2020 0.03  0.00 - 0.20 10*3/mm3 Final   • Immature Grans, Absolute 11/27/2020 0.02  0.00 - 0.05 10*3/mm3 Final   • nRBC 11/27/2020 0.0  0.0 - 0.2 /100 WBC Final           Assessment/Plan   Diagnoses and all orders for this visit:    1. Gastroesophageal reflux disease without esophagitis (Primary)  Comments:  Doing well on Nexium  Refill for a year.      2. Coronary artery disease involving native coronary artery of native heart without angina pectoris  Comments:  Follow up with his cardiologist, Dr. Cosby, as planned.     Other orders  -     esomeprazole (nexIUM) 40 MG capsule; Take 1 capsule by mouth Daily.  Dispense: 90 capsule; Refill: 3        Total time spent on evaluation of patient:  15 min        thyroid tenderness, no JVD. Pulm: Clear equal air entry no added sounds, no wheezing or rhonchi    CVS: S1 + S2, no murmur, no heave. Dorsalis pedis pulse palpable   Abd: soft lax, no tenderness, no organomegaly, audible bowel sounds. Skin: warm, no lesions, no rash.  No callus, no Ulcers, No acanthosis nigricans  Musculoskeletal: No back tenderness, no kyphosis/scoliosis    Neuro: CN intact, Monofilament sensation decreased bilateral , muscle power normal  Psych: normal mood, and affect      Review of Laboratory Data:  I personally reviewed the following lab:  Lab Results   Component Value Date/Time    WBC 8.2 06/01/2022 01:00 PM    RBC 4.41 06/01/2022 01:00 PM    HGB 12.6 06/01/2022 01:00 PM    HCT 38.4 06/01/2022 01:00 PM    MCV 87.1 06/01/2022 01:00 PM    MCH 28.6 06/01/2022 01:00 PM    MCHC 32.8 06/01/2022 01:00 PM    RDW 13.4 06/01/2022 01:00 PM     06/01/2022 01:00 PM    MPV 10.4 06/01/2022 01:00 PM      Lab Results   Component Value Date/Time     06/01/2022 01:00 PM    K 3.7 06/01/2022 01:00 PM    CO2 26 06/01/2022 01:00 PM    BUN 26 (H) 06/01/2022 01:00 PM    CREATININE 1.0 06/01/2022 01:00 PM    CALCIUM 9.7 06/01/2022 01:00 PM    LABGLOM 54 06/01/2022 01:00 PM    GFRAA >60 06/01/2022 01:00 PM      Lab Results   Component Value Date/Time    TSH 2.010 04/26/2022 04:45 PM     Lab Results   Component Value Date/Time    LABA1C 7.7 07/26/2022 03:32 PM    GLUCOSE 138 06/01/2022 01:00 PM    MALBCR 29.6 09/04/2020 09:10 AM    LABMICR 19.3 12/10/2021 09:19 AM    LABCREA 48 09/04/2020 09:10 AM     Lab Results   Component Value Date/Time    LABA1C 7.7 07/26/2022 03:32 PM    LABA1C 9.0 04/26/2022 04:45 PM    LABA1C 8.1 03/15/2022 09:44 AM     Lab Results   Component Value Date/Time    HDL 59 04/26/2022 04:45 PM    LDLCALC 115 04/26/2022 04:45 PM     Lab Results   Component Value Date/Time    VITD25 43 04/26/2022 04:45 PM    VITD25 40 12/10/2021 09:20 AM       ASSESSMENT & RECOMMENDATIONS   Tammy Quinteros Alee Ovalles, a 68 y.o.-old female seen in for the following issues       Assessment:      Diagnosis Orders   1. Uncontrolled type 2 diabetes mellitus with hyperglycemia (HCC)  POCT glycosylated hemoglobin (Hb A1C)      2. Mixed hyperlipidemia        3. Vitamin D deficiency        4. Essential hypertension        5. Type 2 diabetes mellitus with stage 2 chronic kidney disease, with long-term current use of insulin ([facility])  insulin 70-30 (NOVOLIN 70/30) (70-30) 100 UNIT per ML injection vial          Plan:     1. Uncontrolled type 2 diabetes mellitus with hyperglycemia ([address]   Diabetes usually well controlled. Hemoglobin A1c 7.7%  Occasional hypoglycemia usually before dinner  Plan: Adjust Novolin 70/30 to 26 units with breakfast.  Continue 15 units at dinner  Continue metformin 500 mg 1 tablet twice daily  Check blood sugars twice daily  Counseled on hypoglycemia. Counseled on treatment of hypoglycemia  Advised patient to call if hypoglycemia continues  Following with neurology for diabetic amyotrophy   2. Mixed hyperlipidemia   Continue statin therapy. Counseled on the importance of statin therapy with diabetes   3. Vitamin D deficiency   Continue vitamin D supplementation. Last vitamin D within normal limits   4. Essential hypertension   BP goal less than 140/90. Advise follow-up with PCP         I personally spent greater than 30 minutes reviewing  external notes from PCP and other patient's care team providers, and personally interpreted labs associated with the above diagnosis. Return in about 3 months (around 10/26/2022). The above issues were reviewed with the patient who understood and agreed with the plan. Thank you for allowing us to participate in the care of this patient. Please do not hesitate to contact us with any additional questions.      Betty Staples, [address] and Endocrinology   [address] [facility] [address]   Phone: [phone]  Fax: [phone]  --------------------------------------------  An electronic signature was used to authenticate this note.  Светлана Krause, MARKEL - CNS  on 7/26/2022 at 3:47 PM

## 2022-07-27 DIAGNOSIS — E78.2 MIXED HYPERLIPIDEMIA: ICD-10-CM

## 2022-07-27 NOTE — TELEPHONE ENCOUNTER
[facility] CLINICAL PHARMACY: ADHERENCE REVIEW  Identified care gap per [employer]: fills at [facility]: ACE/ARB, Diabetes, and Statin adherence    Last Visit: 7/1/22    Patient also appears to be prescribed: Metformin     Patient not found in Outcomes [address] Records claims through 7/2/22 [employer] [facility] [place] =  97%; Potential Fail Date: 9/25/22 ):   LISINOP/HCTZ TAB 20-12.5 last filled on 4/23/22 for 90 day supply. Next refill due: 7/22/22    Per [facility]:   LISINOP/HCTZ TAB 20-12.5 last picked up on 4/23/22 for 90 day supply. 1 refills remaining. Billed through [employer]. Pharmacy filled over phone and awaiting . Billed through [employer]. BP Readings from Last 3 Encounters:   07/26/22 133/68   07/01/22 (!) 146/70   07/01/22 136/68     Estimated Creatinine Clearance: 49 mL/min (based on SCr of 1 mg/dL). DIABETES ADHERENCE    Per [facility]:   Metformin last picked up on 6/8/22 for 90 day supply. 1 refills remaining. Billed through [employer]   Component Value Date    LABA1C 7.7 07/26/2022    LABA1C 9.0 (H) 04/26/2022    LABA1C 8.1 03/15/2022     NOTE A1c <9%    STATIN ADHERENCE    Insurance Records claims through 7/2/22  [place] =  78%; Potential Fail Date: 8/7/22 ):   ATORVASTATIN TAB 40MG due to refill 6/9/22 for 90 day supply. Per [facility]:   ATORVASTATIN TAB 40MG last picked up on 3/11/22 for 90 day supply. 0 refills remaining. Billed through [employer]. Patient chart shows new rx sent in on 7/1 but pharmacy denies ever receiving. Pharmacy sent rx request while on phone to provider.      Lab Results   Component Value Date    HDL 59 04/26/2022    LDLCALC 115 (H) 04/26/2022     ALT   Date Value Ref Range Status   06/01/2022 16 0 - 32 U/L Final     AST   Date Value Ref Range Status   06/01/2022 21 0 - 31 U/L Final     The 10-year ASCVD risk score (Cher Baker et al., 2013) is: 42.6%    Values used to calculate the score:      Age: 68 years      Sex: Female      Is Non- : No      Diabetic: Yes      Tobacco smoker: No      Systolic Blood Pressure: 266 mmHg      Is BP treated: Yes      HDL Cholesterol: 59 mg/dL      Total Cholesterol: 191 mg/dL     PLAN  The following are interventions that have been identified:   - Patient overdue refilling Atorvastatin and Lisinopril/HCTZ and active on home medication list.   - Patient needs refills for Atorvastatin     Attempting to reach patient to review. Left message asking for return call. Need to remind patient to  Lisinopril/HCTZ from pharmacy. Also monitor status of new rx for Atorvastatin being sent to pharmacy.          Future Appointments   Date Time Provider Corey Gray   10/7/2022  9:15 AM MD [facility]   11/29/2022  3:40 PM MARKEL Charles - MARISEL BDM ENDO [address]   Direct: [phone]  Phone: toll free [phone]

## 2022-07-27 NOTE — LETTER
[place]  [address], [address]        Ginny Caban   [address] [facility] [address]           08/01/22     Dear Ginny Caban,    We tried to reach you recently regarding your atorvastatin 40mg daily, but were unable to reach you on the telephone. If you are no longer taking or taking differently, please call us at the number below so that we can discuss this and update your medication profile. [facility] [place] has a refill ready to , if you haven't already. It appears that this medication has not been filled at proper times. We are worried you might be missing doses or not taking it as directed. It is important that you take your medications regularly and try not to miss a single dose.     Some ways to help you remember to take and refill your medications are to:  · Use a pill box, set an alarm, and/or keep your medication near something that you do every day  · Ask your pharmacy if they participate in [facility]", a program where you can  all of your medications on the same day  · Ask your pharmacy if you can be set up with automatic refill, where they will automatically refill your prescription when it is due and let you know it's ready to     Sincerely,   Atif Meyers, PharmD, [facility]  Department, toll free: [phone], option 1

## 2022-07-29 RX ORDER — ATORVASTATIN CALCIUM 40 MG/1
40 TABLET, FILM COATED ORAL EVERY 24 HOURS
Qty: 100 TABLET | Refills: 1 | Status: SHIPPED
Start: 2022-07-29 | End: 2022-08-01 | Stop reason: SDUPTHER

## 2022-07-29 NOTE — TELEPHONE ENCOUNTER
Called pharmacy and confirmed patient did  Lisinopril/HCTZ. Pharmacy stated they never received a new prescription for Atorvastatin yet. Spoke to different employee than last call and they still did not see a new rx on file that was sent 7/1 either. Will route to pharmacist to look into and possibly reach out to Dr. Albina Ho office to see if they could send prescription again to pharmacy. Patient last saw provider 7/1/22.       Jhonatan Bautista, [address] [facility]   Phone: [phone]

## 2022-07-29 NOTE — TELEPHONE ENCOUNTER
Walt Gutierrez MD, patient out of refills (appears 7/1 rx printed instead of e-rx), eligible for 100-day supply.  Rx pended for your signature/modification as appropriate    LOV: 7/1/22  Next: 10/7/22    Thank you,  Erlin Meyers, PharmD, [facility]  Department, toll free: [phone], option 1

## 2022-08-01 DIAGNOSIS — E78.2 MIXED HYPERLIPIDEMIA: ICD-10-CM

## 2022-08-01 RX ORDER — ATORVASTATIN CALCIUM 40 MG/1
40 TABLET, FILM COATED ORAL EVERY 24 HOURS
Qty: 90 TABLET | Refills: 0 | Status: SHIPPED
Start: 2022-08-01 | End: 2022-11-04 | Stop reason: SDUPTHER

## 2022-08-01 NOTE — TELEPHONE ENCOUNTER
Noted atorvastatin 100-ds reordered 22, then appears another 90ds from pharmacy request this morning? Spoke to patient's [employer] - report #100 atorvastatin from  rx currently ready for patient to . Attempting again to reach patient.  Left another message and will send letter.     =======================================================   For Pharmacy Admin Tracking Only    CPA in place:  No  Recommendation Provided To: Provider: 1 via Note to Provider and Pharmacy: 1  Intervention Detail: Adherence Monitorin and Refill(s) Provided  Gap Closed?: No   Intervention Accepted By: Provider: 1 and Pharmacy: 1  Time Spent (min): 15

## 2022-08-01 NOTE — TELEPHONE ENCOUNTER
Last Appointment:  7/1/2022  Future Appointments   Date Time Provider Corey Gray   10/7/2022  9:15 AM [facility]   11/29/2022  3:40 PM MARKEL Valentine - CNS B[facility]

## 2022-09-16 RX ORDER — FUROSEMIDE 20 MG/1
20 TABLET ORAL DAILY
Qty: 30 TABLET | Refills: 2 | Status: SHIPPED
Start: 2022-09-16 | End: 2022-11-04 | Stop reason: SDUPTHER

## 2022-11-04 ENCOUNTER — OFFICE VISIT (OUTPATIENT)
Dept: PRIMARY CARE CLINIC | Age: 77
End: 2022-11-04
Payer: MEDICARE

## 2022-11-04 VITALS
WEIGHT: 190 LBS | TEMPERATURE: 97.3 F | BODY MASS INDEX: 34.96 KG/M2 | HEIGHT: 62 IN | HEART RATE: 76 BPM | DIASTOLIC BLOOD PRESSURE: 90 MMHG | OXYGEN SATURATION: 95 % | SYSTOLIC BLOOD PRESSURE: 160 MMHG

## 2022-11-04 DIAGNOSIS — I10 BENIGN ESSENTIAL HYPERTENSION: ICD-10-CM

## 2022-11-04 DIAGNOSIS — E11.65 UNCONTROLLED TYPE 2 DIABETES MELLITUS WITH HYPERGLYCEMIA (HCC): ICD-10-CM

## 2022-11-04 DIAGNOSIS — E55.9 VITAMIN D DEFICIENCY: ICD-10-CM

## 2022-11-04 DIAGNOSIS — R53.83 OTHER FATIGUE: ICD-10-CM

## 2022-11-04 DIAGNOSIS — E78.2 MIXED HYPERLIPIDEMIA: ICD-10-CM

## 2022-11-04 DIAGNOSIS — Z79.899 LONG TERM CURRENT USE OF THERAPEUTIC DRUG: ICD-10-CM

## 2022-11-04 DIAGNOSIS — N18.31 TYPE 2 DIABETES MELLITUS WITH STAGE 3A CHRONIC KIDNEY DISEASE, WITH LONG-TERM CURRENT USE OF INSULIN (HCC): ICD-10-CM

## 2022-11-04 DIAGNOSIS — N18.31 STAGE 3A CHRONIC KIDNEY DISEASE (HCC): ICD-10-CM

## 2022-11-04 DIAGNOSIS — R29.898 WEAKNESS OF BOTH LOWER LIMBS: Primary | ICD-10-CM

## 2022-11-04 DIAGNOSIS — Z79.4 TYPE 2 DIABETES MELLITUS WITH STAGE 3A CHRONIC KIDNEY DISEASE, WITH LONG-TERM CURRENT USE OF INSULIN (HCC): ICD-10-CM

## 2022-11-04 DIAGNOSIS — E11.22 TYPE 2 DIABETES MELLITUS WITH STAGE 3A CHRONIC KIDNEY DISEASE, WITH LONG-TERM CURRENT USE OF INSULIN (HCC): ICD-10-CM

## 2022-11-04 DIAGNOSIS — R60.0 LOCALIZED EDEMA: ICD-10-CM

## 2022-11-04 LAB
ALBUMIN SERPL-MCNC: 3.9 G/DL (ref 3.5–5.2)
ALP BLD-CCNC: 127 U/L (ref 35–104)
ALT SERPL-CCNC: 8 U/L (ref 0–32)
ANION GAP SERPL CALCULATED.3IONS-SCNC: 16 MMOL/L (ref 7–16)
AST SERPL-CCNC: 20 U/L (ref 0–31)
BASOPHILS ABSOLUTE: 0.04 E9/L (ref 0–0.2)
BASOPHILS RELATIVE PERCENT: 0.5 % (ref 0–2)
BILIRUB SERPL-MCNC: 0.2 MG/DL (ref 0–1.2)
BUN BLDV-MCNC: 36 MG/DL (ref 6–23)
CALCIUM SERPL-MCNC: 9.8 MG/DL (ref 8.6–10.2)
CHLORIDE BLD-SCNC: 98 MMOL/L (ref 98–107)
CHOLESTEROL, FASTING: 245 MG/DL (ref 0–199)
CO2: 27 MMOL/L (ref 22–29)
CREAT SERPL-MCNC: 1.1 MG/DL (ref 0.5–1)
EOSINOPHILS ABSOLUTE: 0.13 E9/L (ref 0.05–0.5)
EOSINOPHILS RELATIVE PERCENT: 1.7 % (ref 0–6)
FOLATE: >20 NG/ML (ref 4.8–24.2)
GFR SERPL CREATININE-BSD FRML MDRD: 52 ML/MIN/1.73
GLUCOSE BLD-MCNC: 149 MG/DL (ref 74–99)
HBA1C MFR BLD: 8.4 % (ref 4–5.6)
HCT VFR BLD CALC: 36.9 % (ref 34–48)
HDLC SERPL-MCNC: 54 MG/DL
HEMOGLOBIN: 12.2 G/DL (ref 11.5–15.5)
IMMATURE GRANULOCYTES #: 0.02 E9/L
IMMATURE GRANULOCYTES %: 0.3 % (ref 0–5)
LDL CHOLESTEROL CALCULATED: 172 MG/DL (ref 0–99)
LYMPHOCYTES ABSOLUTE: 0.92 E9/L (ref 1.5–4)
LYMPHOCYTES RELATIVE PERCENT: 11.7 % (ref 20–42)
MAGNESIUM: 1.8 MG/DL (ref 1.6–2.6)
MCH RBC QN AUTO: 29.8 PG (ref 26–35)
MCHC RBC AUTO-ENTMCNC: 33.1 % (ref 32–34.5)
MCV RBC AUTO: 90.2 FL (ref 80–99.9)
MONOCYTES ABSOLUTE: 0.4 E9/L (ref 0.1–0.95)
MONOCYTES RELATIVE PERCENT: 5.1 % (ref 2–12)
NEUTROPHILS ABSOLUTE: 6.34 E9/L (ref 1.8–7.3)
NEUTROPHILS RELATIVE PERCENT: 80.7 % (ref 43–80)
PDW BLD-RTO: 13.2 FL (ref 11.5–15)
PLATELET # BLD: 200 E9/L (ref 130–450)
PMV BLD AUTO: 10.7 FL (ref 7–12)
POTASSIUM SERPL-SCNC: 4.2 MMOL/L (ref 3.5–5)
RBC # BLD: 4.09 E12/L (ref 3.5–5.5)
SODIUM BLD-SCNC: 141 MMOL/L (ref 132–146)
TOTAL PROTEIN: 7 G/DL (ref 6.4–8.3)
TRIGLYCERIDE, FASTING: 95 MG/DL (ref 0–149)
TSH SERPL DL<=0.05 MIU/L-ACNC: 2.42 UIU/ML (ref 0.27–4.2)
VITAMIN B-12: 478 PG/ML (ref 211–946)
VITAMIN D 25-HYDROXY: 31 NG/ML (ref 30–100)
VLDLC SERPL CALC-MCNC: 19 MG/DL
WBC # BLD: 7.9 E9/L (ref 4.5–11.5)

## 2022-11-04 PROCEDURE — 3078F DIAST BP <80 MM HG: CPT | Performed by: INTERNAL MEDICINE

## 2022-11-04 PROCEDURE — 1123F ACP DISCUSS/DSCN MKR DOCD: CPT | Performed by: INTERNAL MEDICINE

## 2022-11-04 PROCEDURE — 3051F HG A1C>EQUAL 7.0%<8.0%: CPT | Performed by: INTERNAL MEDICINE

## 2022-11-04 PROCEDURE — 3075F SYST BP GE 130 - 139MM HG: CPT | Performed by: INTERNAL MEDICINE

## 2022-11-04 PROCEDURE — 99214 OFFICE O/P EST MOD 30 MIN: CPT | Performed by: INTERNAL MEDICINE

## 2022-11-04 RX ORDER — FUROSEMIDE 20 MG/1
20 TABLET ORAL DAILY
Qty: 90 TABLET | Refills: 1 | Status: SHIPPED | OUTPATIENT
Start: 2022-11-04

## 2022-11-04 RX ORDER — METOPROLOL SUCCINATE 50 MG/1
50 TABLET, EXTENDED RELEASE ORAL DAILY
Qty: 90 TABLET | Refills: 1 | Status: SHIPPED | OUTPATIENT
Start: 2022-11-04

## 2022-11-04 RX ORDER — LISINOPRIL AND HYDROCHLOROTHIAZIDE 20; 12.5 MG/1; MG/1
1 TABLET ORAL 2 TIMES DAILY
Qty: 180 TABLET | Refills: 1 | Status: SHIPPED | OUTPATIENT
Start: 2022-11-04

## 2022-11-04 RX ORDER — HYDRALAZINE HYDROCHLORIDE 25 MG/1
25 TABLET, FILM COATED ORAL 3 TIMES DAILY
Qty: 270 TABLET | Refills: 1 | Status: SHIPPED | OUTPATIENT
Start: 2022-11-04

## 2022-11-04 RX ORDER — ATORVASTATIN CALCIUM 40 MG/1
40 TABLET, FILM COATED ORAL EVERY 24 HOURS
Qty: 90 TABLET | Refills: 1 | Status: SHIPPED | OUTPATIENT
Start: 2022-11-04

## 2022-11-04 ASSESSMENT — ENCOUNTER SYMPTOMS
ABDOMINAL PAIN: 0
SHORTNESS OF BREATH: 0
CHEST TIGHTNESS: 0
NAUSEA: 0
VOMITING: 0
BLOOD IN STOOL: 0
EYE PAIN: 0
SORE THROAT: 0
COUGH: 0
RHINORRHEA: 0
CONSTIPATION: 0
DIARRHEA: 1

## 2022-11-04 NOTE — PROGRESS NOTES
[facility]) Physicians   Internal Medicine     2022  Gallo Perrin : 1945 Sex: female  Age:74 y.o. Chief Complaint   Patient presents with    Edema     Legs have been swelling - believes it is from the gabapentin so she is now only taking one daily         HPI:   Patient presents to office for evaluation of the following medical concerns. HPI:   Patient presents to office for evaluation of the following medical concerns. -  has been having leg weakness b/l and aches in b/l legs since (3/2022) , now left more than right. States also with swelling. States aches are worse at night. States having issues with getting up, no strength in legs. States some numbness.  has been taking ibuprofen multiple time a day. 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 Has seen neurology at [facility] [place] feel diabetic amyotrophy, had EMG (2022). Started on gabapentin on 300mg once a day due to side effects. 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  has seen retinal specialist and receiving injections. Possible Unsure if Retinopathy. 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 Following with endocrinology for Diabetes.  monitors sugars at home. States on 70/30 insulin increased to 26 units in am and 15 units in pm. On metformin. Stopped Tradjenta. Adjusted novalog Slide scale if over 200.  has had episodes of hypoglycemia since last visit. Not always compliant with diet.  Last a1c was 7.7 (7/2022)     - States has hyperlipidemia. On atorvastatin for high intensity statin. Trying to watch diet. No reported side effects. Frequently forgets to take at bedtime.    - States has high blood pressure. States does randomly check blood pressure at home. States 130/80's. On lisinopril and HCTZ and metoprolol. On hydralazine States has some fatigue. Was seen in ER (5/22) -blood pressure at home evaluated with labs chest x-ray EKG States had hydralazine added and was recently increased to 25mg three times. by another pcp in [address] [place] [address] (6/2022). States self decreased hydralazine to daily     - States right shoulder pain has improved. - Labs show kidney dysfunction, last lab (12/2021) - still present      - lab work from 12/10/2021 reviewed with patient 7/1/2022    Health Maintenance   - immunizations:   Influenza Vaccination - ([id number]), (2022)   Pneumonia Vaccination - (2018) - prevnar, (12/2019) - pneumococcal 23   Zoster/Shingles Vaccine  Tetanus Vaccination  covid (2/2/2021) #1, (2/23/2021) #2, (9/28/2021) #3  - Lewis Rito, (11/3/2022) - bivalent pfizer     - Screenings:   Bone Density Scan - (2012) - normal  Pelvic/Pap Exam - (2014), (2015) - gyn  Mammogram - (2015) - gyn    Colonoscopy - declines  FIT (8/2019) - negative, (4/2021) - order     Couseled on Home Safety  - (6/9/2017)    Dilated Eye Exam - (12/2017) - Wet AMD, diabetes, cataract, (5/2018) - no Retinopathy, wet AMD (11/2018)    Mri lumbar (5/2022) - Multilevel lumbar degenerative disc disease and facet joint   arthrosis. Minimal retrolisthesis at the L2-L3 and L3-L4 levels. 2.  Multilevel central canal stenosis most pronounced at the L3-L4 levels. 3.  Multilevel degenerative neural foraminal narrowing, most pronounced   the L5-S1 level. Chen (6/22) - norm rt & lt, Moderately decreased right toe brachial index at 0.57. Normal left toe brachial index measuring 0.82. Abnormal digital waveforms is specially in the right 2nd and 3rd digits.  may be related to small vessel distal disease    ROS:  Review of Systems   Constitutional:  Negative for appetite change, chills, fever and unexpected weight change. HENT:  Negative for congestion, rhinorrhea and sore throat. Eyes:  Negative for pain and visual disturbance. Respiratory:  Negative for cough, chest tightness and shortness of breath. Cardiovascular:  Negative for chest pain and palpitations. Gastrointestinal:  Positive for diarrhea. Negative for abdominal pain, blood in stool, constipation, nausea and vomiting. Genitourinary:  Negative for difficulty urinating, dysuria, frequency, pelvic pain, urgency and vaginal bleeding. Musculoskeletal:  Negative for arthralgias and myalgias. Skin:  Negative for rash. Neurological:  Negative for dizziness, syncope, weakness, light-headedness, numbness and headaches. Hematological:  Negative for adenopathy. Psychiatric/Behavioral:  Negative for suicidal ideas. The patient is not nervous/anxious.         Current Outpatient Medications:     furosemide (LASIX) 20 MG tablet, Take 1 tablet by mouth daily, Disp: 90 tablet, Rfl: 1    atorvastatin (LIPITOR) 40 MG tablet, Take 1 tablet by mouth every 24 hours, Disp: 90 tablet, Rfl: 1    metoprolol succinate (TOPROL XL) 50 MG extended release tablet, Take 1 tablet by mouth daily, Disp: 90 tablet, Rfl: 1    hydrALAZINE (APRESOLINE) 25 MG tablet, Take 1 tablet by mouth 3 times daily, Disp: 270 tablet, Rfl: 1    lisinopril-hydroCHLOROthiazide (PRINZIDE;ZESTORETIC) 20-12.5 MG per tablet, Take 1 tablet by mouth 2 times daily, Disp: 180 tablet, Rfl: 1    Handicap Placard MISC, by Does not apply route Duration - lifetime Expiration - 5 years - 11/04/2027, Disp: 1 each, Rfl: 0    insulin 70-30 (NOVOLIN 70/30) (70-30) 100 UNIT per ML injection vial, 26 units with breakfast and 15 units before dinner, Disp: 40 mL, Rfl: 3    Insulin Syringe-Needle U-100 (BD INSULIN SYRINGE U/F) 31G X 5/16\" 0.5 ML MISC, USE AS DIRECTED TO TAKE INSULIN 3 TIMES A DAY, Disp: 100 each, Rfl: 3    gabapentin (NEURONTIN) 300 MG capsule, Take 300 mg by mouth daily. , Disp: , Rfl:     metFORMIN (GLUCOPHAGE) 500 MG tablet, Take 1 tablet by mouth 2 times daily (with meals), Disp: 180 tablet, Rfl: 1    insulin aspart (NOVOLOG) 100 UNIT/ML injection vial, Take for correction of B unit for every 30 mg/dl if the BG is >140 mg/dl (max of 15 units/day), Disp: 10 mL, Rfl: 3    blood glucose test strips (ONETOUCH VERIO) strip, Use to test blood sugar 4 times a day., Disp: 200 each, Rfl: 5    Lancets MISC, Use to check BG 1-2x/day, Disp: 200 each, Rfl: 3    Multiple Vitamins-Minerals (PRESERVISION AREDS 2 PO), Take by mouth 2 times daily, Disp: , Rfl:     vitamin D (CHOLECALCIFEROL) 1000 UNIT TABS tablet, Take 1,000 Units by mouth daily, Disp: , Rfl:     Allergies   Allergen Reactions    Penicillins Hives and Other (See Comments)     EDEMA EXTREMITIES       Past Medical History:   Diagnosis Date    Bilateral nonexudative age-related macular degeneration 2019    HLD (hyperlipidemia)     HTN (hypertension)     Stage 2 chronic kidney disease 2019    T2DM (type 2 diabetes mellitus) (HCC)     Uterine cancer ([address]     s/p surgery    Vitamin D deficiency 2019       Past Surgical History:   Procedure Laterality Date    HYSTERECTOMY, TOTAL ABDOMINAL (CERVIX REMOVED)      TONSILLECTOMY         Family History   Problem Relation Age of Onset    Heart Disease Mother         late onset,  in 80's    Coronary Art Dis Mother     Heart Disease Father         late onset,  in 80's    Dementia Father     Coronary Art Dis Father     No Known Problems Son     No Known Problems Son     No Known Problems Son        Social History     Socioeconomic History    Marital status:      Spouse name: Not on file    Number of children: 3    Years of education: Not on file    Highest education level: Not on file   Occupational History    Occupation:  Tobacco Use    Smoking status: Never    Smokeless tobacco: Never   Substance and Sexual Activity    Alcohol use: No     Comment: Social very rare    Drug use: No    Sexual activity: Never   Other Topics Concern    Not on file   Social History Narrative    Lives in a house in [place] alone     Social Determinants of Health     Financial Resource Strain: Low Risk     Difficulty of Paying Living Expenses: Not hard at all   Food Insecurity: No Food Insecurity    Worried About Running Out of Food in the Last Year: Never true    Ran Out of Food in the Last Year: Never true   Transportation Needs: Not on file   Physical Activity: Insufficiently Active    Days of Exercise per Week: 3 days    Minutes of Exercise per Session: 30 min   Stress: Not on file   Social Connections: Not on file   Intimate Partner Violence: Not on file   Housing Stability: Not on file       Vitals:    11/04/22 0921   BP: (!) 160/90   Pulse: 76   Temp: 97.3 °F (36.3 °C)   SpO2: 95%   Weight: 190 lb (86.2 kg)   Height: 5' 2\" (1.575 m)       Exam:  Physical Exam  Vitals reviewed. Constitutional:       Appearance: She is well-developed. HENT:      Head: Normocephalic and atraumatic. Right Ear: External ear normal.      Left Ear: External ear normal.   Eyes:      Pupils: Pupils are equal, round, and reactive to light. Neck:      Thyroid: No thyromegaly. Cardiovascular:      Rate and Rhythm: Normal rate and regular rhythm. Heart sounds: Normal heart sounds. No murmur heard. Pulmonary:      Effort: Pulmonary effort is normal.      Breath sounds: Normal breath sounds. No wheezing. Abdominal:      General: Bowel sounds are normal. There is no distension. Palpations: Abdomen is soft. Tenderness: There is no abdominal tenderness. There is no guarding or rebound. Musculoskeletal:         General: Normal range of motion. Cervical back: Normal range of motion and neck supple. Right lower leg: Edema present.       Left lower leg: Edema present. Comments: Tenderness to thigh b/l   weakness to resistance of LE b/l    Lymphadenopathy:      Cervical: No cervical adenopathy. Skin:     General: Skin is warm and dry. Neurological:      Mental Status: She is alert and oriented to person, place, and time. Cranial Nerves: No cranial nerve deficit.    Psychiatric:         Judgment: Judgment normal.       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  - had MRI lumbar spine (5/2022)   - had EMG   - [place] vascular borderline on the right (6/2022  - on gabapentin   - had home PT   - in outpatient PT     Type 2 diabetes mellitus with stage 3a chronic kidney disease, with long-term current use of insulin ([address]  - following with endocrinology   - monitor sugar at home  - now on 70/30 insulin 26 units in am and 15 units in pm  - Humalog with meals and slide scale add to that so that any sugar > 200   - on metformin   - monitor for hypoglycemia and action plan  - Last A1c was 7.7 (7/2022)  - non compliant with diet  - established with endocrinology   - discussed to ask endocrinology about invokana for DM kidney disease risk reduction and cardiovascular risk reduction     follows with optho (2/21) wet AMD OS s/p avastin, nonexudative macular OD  on statin  on asa  On ACE     Benign essential hypertension  - monitor blood pressure at home  - watch diet   - declines amlodipine   - on metoprolol  - on lisinopril-HCTZ  - on hydralazine increased to 25mg three times a day   - suggested nephrology - declines   - borderline elevated today 11/4/2022    Mixed hyperlipidemia  - watch diet  - on atorvastatin 40mg - high intensity statin  - follow labs   - last lab (12/2021) - improved   - had held statin to see if leg weakness would improve - no change - recommend restart of statin     Localized edema  - as above     Vitamin D deficiency  - on otc supplement  - follow labs     Endometrial intraepithelial neoplasia (EIN)  -s/p surgery  - follows with gyn    Stage 3a chronic kidney disease ([address]  - Noted on labs   - possibly multifactorial (HTN, DM)  - on ACE   - may need to consider nephro referral - declines   - monitor labs - last (6/2022)       Return in about 3 months (around 2/4/2023) for check up and review and labs.     Orders Placed This Encounter   Procedures    Comprehensive Metabolic Panel     Standing Status:   Future     Standing Expiration Date:   11/4/2023    Magnesium     Standing Status:   Future     Standing Expiration Date:   11/4/2023    Lipid, Fasting     Standing Status:   Future     Standing Expiration Date:   11/4/2023    Hemoglobin A1C     Standing Status:   Future     Standing Expiration Date:   11/4/2023    Vitamin D 25 Hydroxy     Standing Status:   Future     Standing Expiration Date:   11/4/2023    TSH     Standing Status:   Future     Standing Expiration Date:   11/4/2023    CBC with Auto Differential     Standing Status:   Future     Standing Expiration Date:   11/4/2023    Vitamin B12 & Folate     Standing Status:   Future     Standing Expiration Date:   11/4/2023     Requested Prescriptions     Signed Prescriptions Disp Refills    furosemide (LASIX) 20 MG tablet 90 tablet 1     Sig: Take 1 tablet by mouth daily    atorvastatin (LIPITOR) 40 MG tablet 90 tablet 1     Sig: Take 1 tablet by mouth every 24 hours    metoprolol succinate (TOPROL XL) 50 MG extended release tablet 90 tablet 1     Sig: Take 1 tablet by mouth daily    hydrALAZINE (APRESOLINE) 25 MG tablet 270 tablet 1     Sig: Take 1 tablet by mouth 3 times daily    lisinopril-hydroCHLOROthiazide (PRINZIDE;ZESTORETIC) 20-12.5 MG per tablet 180 tablet 1     Sig: Take 1 tablet by mouth 2 times daily    Handicap Placard MISC 1 each 0     Sig: by Does not apply route Duration - lifetime  Expiration - 5 years - 11/04/2027 Jean Chowdhury MD  11/4/2022  10:10 AM

## 2022-11-09 ENCOUNTER — TELEPHONE (OUTPATIENT)
Dept: PHARMACY | Facility: CLINIC | Age: 77
End: 2022-11-09

## 2022-11-09 ENCOUNTER — TELEPHONE (OUTPATIENT)
Dept: FAMILY MEDICINE CLINIC | Age: 77
End: 2022-11-09

## 2022-11-09 NOTE — TELEPHONE ENCOUNTER
[facility] CLINICAL PHARMACY: [facility]  Identified care gap per [employer]: fills at [facility]: ACE/ARB and Statin adherence    Last Visit: 11/04/22    Patient identified as LIS = 1, therefore patient may be able to receive a 90-day supply for the same cost as a 30-day supply    Patient found in Outcomes [facility] and is not currently eligible for CMR/TIP    ASSESSMENT  ACE/ARB ADHERENCE    Insurance Records claims through 10/23/22 (Prior Year [place] =  100%; YTD [place] =  94%; Potential Fail Date: 12/24/22 ):   LISINOP/HCTZ TAB 20-12.5 last filled on 07/27/22 for 90 day supply. Next refill due: 10/25/22    Per  [facility]:  LISINOP/HCTZ TAB 20-12.5 last filled on 07/27/22 for 180 day supply. Per [facility] [address]:   Unable to provide information     BP Readings from Last 3 Encounters:   11/04/22 (!) 160/90   07/26/22 133/68   07/01/22 (!) 146/70     Estimated Creatinine Clearance: 44 mL/min (A) (based on SCr of 1.1 mg/dL (H)). [id number] Records claims through 10/23/22 (Prior Year [place] =  100%; YTD [place] =  87%; Potential Fail Date: 11/15/22 ):   ATORVASTATIN TAB 40MG last filled on 07/29/22 for 100 day supply. Next refill due: 11/06/22    Per  [facility]:  ATORVASTATIN TAB 40MG last filled on 07/29/22 for 100 day supply.      Per [facility] [address]:   Unable to provide information     Lab Results   Component Value Date    HDL 54 11/04/2022    LDLCALC 172 (H) 11/04/2022     ALT   Date Value Ref Range Status   11/04/2022 8 0 - 32 U/L Final     AST   Date Value Ref Range Status   11/04/2022 20 0 - 31 U/L Final     The 10-year ASCVD risk score (Vicky ORDONEZ, et al., 2019) is: 55.6%    Values used to calculate the score:      Age: 68 years      Sex: Female      Is Non- : No      Diabetic: Yes      Tobacco smoker: No      Systolic Blood Pressure: 917 mmHg      Is BP treated: Yes      HDL Cholesterol: 54 mg/dL      Total Cholesterol: 245 mg/dL PLAN  The following are interventions that have been identified:   - Patient overdue refilling Atorvastatin and Lisinopril/hctz and active on home medication list.     Attempting to reach patient to review. Left message asking for return call.     Called patient to discuss adherence for both medications and to remind her its time to refill her medications    Unable to speak with [facility] to process a refill      Future Appointments   Date Time Provider Corey Gray   11/29/2022  3:40 PM MARKEL Tyler - CNS [facility]   3/10/2023  9:30 AM MD LOUIS [facility] [address]   [address] // Department, toll free [phone], Option 3

## 2022-11-09 NOTE — TELEPHONE ENCOUNTER
Arnaldo Hernandez called back and was advised. She will go back on the cholesterol medication and has enough on hand until her next appointment.

## 2022-11-09 NOTE — LETTER
[place]  [address], [address]        [place]   [address] [facility] [address]           11/10/22     Dear Troy San,    We tried to reach you recently regarding your LISINOP/HCTZ TAB 20-12.5 and ATORVASTATIN TAB 40MG, but were unable to reach you on the telephone. We have on file that you are currently taking LISINOP/HCTZ TAB 20-12.5 and ATORVASTATIN TAB 40MG. If you are no longer taking or taking differently, please call us at the number below so that we can discuss this and update your medication profile. It appears that this medication has not been filled at proper times. We are worried you might be missing doses or not taking it as directed. It is important that you take your medications regularly and try not to miss a single dose.     Some ways to help you remember to take and refill your medications are to:  · Use a pill box, set an alarm, and/or keep your medication near something that you do every day  · Ask your pharmacy if they participate in [facility]", a program where you can  all of your medications on the same day  · Ask your pharmacy if you can be set up with automatic refill, where they will automatically refill your prescription when it is due and let you know it's ready to     Sincerely,   [address] [facility] // Department, toll free [phone], Option 1

## 2022-11-09 NOTE — TELEPHONE ENCOUNTER
Please let the patient know that blood work results showed    Labs still show kidney dysfunction, slight change when compared to previous    Sugar levels were elevated. Hemoglobin A1c is a measure 3-month sugar control was slightly more elevated when compared to previous now at 8.4    Cholesterol levels were much more elevated when compared to previous.   Recommending restarting statin as discussed during office visit    Thyroid levels were normal    Vitamin I32 and folic acid levels were normal    Vitamin D level was normal but on the low end of normal    Blood counts were normal but with some nonspecific changes particular types of white cells    Other electrolytes and liver functions were normal    Thanks

## 2022-11-10 NOTE — TELEPHONE ENCOUNTER
2nd attempt to contact this patient regarding the previous message**    CLINICAL PHARMACY: [facility]  Patient unavailable at the time of call. Left following message on home TAD: please call back at toll-free [phone] option 1 to retrieve previous message. Letter mailed to patient.     Sincerely,   [address]  // Department, toll free [phone], Option 1     For Pharmacy Admin Tracking Only    CPA in place:  No  Intervention Detail: Adherence Monitorin  Gap Closed?: No   Time Spent (min): 10

## 2022-11-29 ENCOUNTER — OFFICE VISIT (OUTPATIENT)
Dept: ENDOCRINOLOGY | Age: 77
End: 2022-11-29
Payer: MEDICARE

## 2022-11-29 VITALS
HEART RATE: 82 BPM | BODY MASS INDEX: 34.15 KG/M2 | WEIGHT: 200 LBS | SYSTOLIC BLOOD PRESSURE: 187 MMHG | HEIGHT: 64 IN | DIASTOLIC BLOOD PRESSURE: 84 MMHG | RESPIRATION RATE: 18 BRPM

## 2022-11-29 DIAGNOSIS — I10 ESSENTIAL HYPERTENSION: ICD-10-CM

## 2022-11-29 DIAGNOSIS — E11.65 UNCONTROLLED TYPE 2 DIABETES MELLITUS WITH HYPERGLYCEMIA (HCC): Primary | ICD-10-CM

## 2022-11-29 DIAGNOSIS — E78.2 MIXED HYPERLIPIDEMIA: ICD-10-CM

## 2022-11-29 DIAGNOSIS — E55.9 VITAMIN D DEFICIENCY: ICD-10-CM

## 2022-11-29 PROCEDURE — 3075F SYST BP GE 130 - 139MM HG: CPT | Performed by: CLINICAL NURSE SPECIALIST

## 2022-11-29 PROCEDURE — 3078F DIAST BP <80 MM HG: CPT | Performed by: CLINICAL NURSE SPECIALIST

## 2022-11-29 PROCEDURE — 3052F HG A1C>EQUAL 8.0%<EQUAL 9.0%: CPT | Performed by: CLINICAL NURSE SPECIALIST

## 2022-11-29 PROCEDURE — 1123F ACP DISCUSS/DSCN MKR DOCD: CPT | Performed by: CLINICAL NURSE SPECIALIST

## 2022-11-29 PROCEDURE — 99214 OFFICE O/P EST MOD 30 MIN: CPT | Performed by: CLINICAL NURSE SPECIALIST

## 2022-11-29 NOTE — PROGRESS NOTES
[address] [place] [address] Department of Endocrinology Diabetes and Metabolism   [address] [facility] [address]   Phone: [phone]  Fax: [phone]    Date of Service: 11/29/2022    Primary Care Physician: Gay Sher MD  Referring physician: No ref. provider found  Provider: MARKEL Lord     Reason for the visit:  Type 2 DM       History of Present Illness: The history is provided by the patient. No  was used. Accuracy of the patient data is excellent. Gokul Dupree is a very pleasant 68 y.o. female seen today for diabetes management     Gokul Dupree was diagnosed with diabetes in 1995 and currently on Metformin 500 mg BID, Novolin 70/30 26 units in AM and 15 units in PM,  Novolog mod dose ISS. She holds her 70/30  If BS is < 100.            The patient has been checking blood sugar 2-3  times a day and readings are mostly at goal  Recently diagnosed with diabetic amyotrophy  Following with [facility] neurology   Receiving physical therapy 2 days a week    Most recent A1c results summarized below  Lab Results   Component Value Date/Time    LABA1C 8.4 11/04/2022 10:38 AM    LABA1C 7.7 07/26/2022 03:32 PM    LABA1C 9.0 04/26/2022 04:45 PM       Patient has had infrequent  hypoglycemic episodes usually before dinner    The patient has been mindful of what has been eating and is following a diabetes diet    I reviewed current medications and the patient has no issues with diabetes medications  Gokul Dupree is up to date with eye exam and denied any history of diabetic retinopathy   Last eye exam was 9/2022 , no h/o diabetic retinopathy  The patient performs own feet care  Microvascular complications:  No Retinopathy, stage 3 CKD  or Neuropathy   Macrovascular complications: no CAD, PVD, or Stroke  PAST MEDICAL HISTORY   Past Medical History:   Diagnosis Date    Bilateral nonexudative age-related macular degeneration 5/9/2019    HLD (hyperlipidemia) HTN (hypertension)     Stage 2 chronic kidney disease 2019    T2DM (type 2 diabetes mellitus) ([facility]     Uterine cancer ([facility]     s/p surgery    Vitamin D deficiency 2019       PAST SURGICAL HISTORY   Past Surgical History:   Procedure Laterality Date    HYSTERECTOMY, TOTAL ABDOMINAL (CERVIX REMOVED)      TONSILLECTOMY         SOCIAL HISTORY   Tobacco:   reports that she has never smoked. She has never used smokeless tobacco.  Alcohol:   reports no history of alcohol use. Drugs:   reports no history of drug use.     FAMILY HISTORY   Family History   Problem Relation Age of Onset    Heart Disease Mother         late onset,  in 80's    Coronary Art Dis Mother     Heart Disease Father         late onset,  in 80's    Dementia Father     Coronary Art Dis Father     No Known Problems Son     No Known Problems Son     No Known Problems Son        ALLERGIES AND DRUG REACTIONS   Allergies   Allergen Reactions    Penicillins Hives and Other (See Comments)     EDEMA EXTREMITIES       CURRENT MEDICATIONS   Current Outpatient Medications   Medication Sig Dispense Refill    furosemide (LASIX) 20 MG tablet Take 1 tablet by mouth daily 90 tablet 1    atorvastatin (LIPITOR) 40 MG tablet Take 1 tablet by mouth every 24 hours 90 tablet 1    metoprolol succinate (TOPROL XL) 50 MG extended release tablet Take 1 tablet by mouth daily 90 tablet 1    hydrALAZINE (APRESOLINE) 25 MG tablet Take 1 tablet by mouth 3 times daily 270 tablet 1    lisinopril-hydroCHLOROthiazide (PRINZIDE;ZESTORETIC) 20-12.5 MG per tablet Take 1 tablet by mouth 2 times daily 180 tablet 1    Handicap Placard MISC by Does not apply route Duration - lifetime  Expiration - 5 years - 2027 1 each 0    insulin 70-30 (NOVOLIN 70/30) (70-30) 100 UNIT per ML injection vial 26 units with breakfast and 15 units before dinner 40 mL 3    Insulin Syringe-Needle U-100 (BD INSULIN SYRINGE U/F) 31G X 5/16\" 0.5 ML MISC USE AS DIRECTED TO TAKE INSULIN 3 TIMES A  each 3    gabapentin (NEURONTIN) 300 MG capsule Take 300 mg by mouth daily. metFORMIN (GLUCOPHAGE) 500 MG tablet Take 1 tablet by mouth 2 times daily (with meals) 180 tablet 1    blood glucose test strips (ONETOUCH VERIO) strip Use to test blood sugar 4 times a day. 200 each 5    Lancets MISC Use to check BG 1-2x/day 200 each 3    Multiple Vitamins-Minerals (PRESERVISION AREDS 2 PO) Take by mouth 2 times daily      vitamin D (CHOLECALCIFEROL) 1000 UNIT TABS tablet Take 1,000 Units by mouth daily      insulin aspart (NOVOLOG) 100 UNIT/ML injection vial Take for correction of B unit for every 30 mg/dl if the BG is >140 mg/dl (max of 15 units/day) (Patient not taking: Reported on 2022) 10 mL 3     No current facility-administered medications for this visit. Review of Systems  Constitutional: No fever, no chills, no diaphoresis, no generalized weakness. HEENT: No blurred vision, No sore throat, no ear pain, no hair loss  Neck: denied any neck swelling, difficulty swallowing,   Cardio-pulmonary: No CP, SOB or palpitation, No orthopnea or PND. No cough or wheezing. GI: No N/V/D, no constipation, No abdominal pain, no melena or hematochezia   : Denied any dysuria, hematuria, flank pain, discharge, or incontinence. Skin: denied any rash, ulcer, Hirsute, or hyperpigmentation. MSK: denied any joint deformity, joint pain/swelling, muscle pain, or back pain.   Neuro: no numbness, no tingling, no weakness, _    OBJECTIVE    BP (!) 187/84   Pulse 82   Resp 18   Ht 5' 4\" (1.626 m)   Wt 200 lb (90.7 kg)   BMI 34.33 kg/m²   BP Readings from Last 4 Encounters:   22 (!) 187/84   22 (!) 160/90   22 133/68   22 (!) 146/70     Wt Readings from Last 6 Encounters:   22 200 lb (90.7 kg)   22 190 lb (86.2 kg)   22 190 lb (86.2 kg)   22 191 lb (86.6 kg)   22 198 lb 3.2 oz (89.9 kg)   03/15/22 197 lb (89.4 kg)       Physical examination:  General: awake alert, oriented x3, no abnormal position or movements. HEENT: normocephalic non-traumatic, no exophthalmos   Neck: supple, no LN enlargement, no thyromegaly, no thyroid tenderness, no JVD. Pulm: Clear equal air entry no added sounds, no wheezing or rhonchi    CVS: S1 + S2, no murmur, no heave. Dorsalis pedis pulse palpable   Abd: soft lax, no tenderness, no organomegaly, audible bowel sounds. Skin: warm, no lesions, no rash.  No callus, no Ulcers, No acanthosis nigricans  Musculoskeletal: No back tenderness, no kyphosis/scoliosis    Neuro: CN intact, Monofilament sensation decreased bilateral , muscle power normal  Psych: normal mood, and affect      Review of Laboratory Data:  I personally reviewed the following lab:  Lab Results   Component Value Date/Time    WBC 7.9 11/04/2022 10:38 AM    RBC 4.09 11/04/2022 10:38 AM    HGB 12.2 11/04/2022 10:38 AM    HCT 36.9 11/04/2022 10:38 AM    MCV 90.2 11/04/2022 10:38 AM    MCH 29.8 11/04/2022 10:38 AM    MCHC 33.1 11/04/2022 10:38 AM    RDW 13.2 11/04/2022 10:38 AM     11/04/2022 10:38 AM    MPV 10.7 11/04/2022 10:38 AM      Lab Results   Component Value Date/Time     11/04/2022 10:38 AM    K 4.2 11/04/2022 10:38 AM    CO2 27 11/04/2022 10:38 AM    BUN 36 (H) 11/04/2022 10:38 AM    CREATININE 1.1 (H) 11/04/2022 10:38 AM    CALCIUM 9.8 11/04/2022 10:38 AM    LABGLOM 52 11/04/2022 10:38 AM    GFRAA >60 06/01/2022 01:00 PM      Lab Results   Component Value Date/Time    TSH 2.420 11/04/2022 10:38 AM     Lab Results   Component Value Date/Time    LABA1C 8.4 11/04/2022 10:38 AM    GLUCOSE 149 11/04/2022 10:38 AM    MALBCR 29.6 09/04/2020 09:10 AM    LABMICR 19.3 12/10/2021 09:19 AM    LABCREA 48 09/04/2020 09:10 AM     Lab Results   Component Value Date/Time    LABA1C 8.4 11/04/2022 10:38 AM    LABA1C 7.7 07/26/2022 03:32 PM    LABA1C 9.0 04/26/2022 04:45 PM     Lab Results   Component Value Date/Time    HDL 54 11/04/2022 10:38 AM    LDLCALC 172 11/04/2022 10:38 AM     Lab Results   Component Value Date/Time    VITD25 31 11/04/2022 10:38 AM    VITD25 43 04/26/2022 04:45 PM       ASSESSMENT & RECOMMENDATIONS   Marcia Ashley, a 68 y.o.-old female seen in for the following issues       Assessment:      Diagnosis Orders   1. Uncontrolled type 2 diabetes mellitus with hyperglycemia (HCC)  Microalbumin / Creatinine Urine Ratio      2. Mixed hyperlipidemia        3. Vitamin D deficiency        4. Essential hypertension              Plan:     1. Uncontrolled type 2 diabetes mellitus with hyperglycemia (HCC)   Diabetes usually well controlled per recent BG log   Hba1c 8.4% however this is not matching blood sugar readings  Infrequent hypoglycemia usually before dinner   Plan: Adjust Novolin 70/30 to 24 units with breakfast.  Continue 15 units at dinner  Continue metformin 500 mg 1 tablet twice daily  Check blood sugars twice daily  Counseled on hypoglycemia. Counseled on treatment of hypoglycemia  Advised patient to call if hypoglycemia continues  Following with neurology for diabetic amyotrophy   2. Mixed hyperlipidemia   Recently restarted statin as LDL was significantly elevated   Counseled on the importance of statin therapy with diabetes     3. Vitamin D deficiency   Continue vitamin D supplementation. Last vitamin D within normal limits   4. Essential hypertension   BP goal less than 140/90. Advise follow-up with PCP         I personally spent greater than 30 minutes reviewing  external notes from PCP and other patient's care team providers, and personally interpreted labs associated with the above diagnosis. Return in about 3 months (around 2/28/2023). The above issues were reviewed with the patient who understood and agreed with the plan. Thank you for allowing us to participate in the care of this patient. Please do not hesitate to contact us with any additional questions.      MARKEL Whitaker - CNS     [facility]   [address] [facility], [address] [place] [address],[address] [id number]   Phone: [phone]  Fax: [phone]  --------------------------------------------  An electronic signature was used to authenticate this note.  MARKEL Amor - CNS  on 11/29/2022 at 3:56 PM

## 2022-12-13 ENCOUNTER — CARE COORDINATION (OUTPATIENT)
Dept: CARE COORDINATION | Age: 77
End: 2022-12-13

## 2022-12-13 NOTE — LETTER
12/14/2022    [address] [place] [address] [address]      Dear Kyle Frank    My name is Citlalli Nettles RN and I am an Ambulatory Care Manager who partners with Dr Sandy Smith to improve patients' health. I've been trying to reach you via phone to let you know that, as a member of your care team, I will work with other providers involved in your care, offer education for your specific health conditions, and connect you with more resources as needed. This program is designed to provide you with the opportunity to have a [facility] partner with you for the following situations:     1) if you come home from the hospital or emergency room   2) to help manage your disease   3) when you would like assistance coordinating services or appointments    This added support is provided at no additional cost to you. My primary focus is to help you achieve specific goals and improve your health. Please call me at [phone] to further discuss how I can support your health care needs.     Sincerely,    SHREYAS La, RN  Ambulatory Care Manager

## 2022-12-13 NOTE — CARE COORDINATION
Attempted to contact pt to discuss and offer care coordination enrollment, no answer. Left a VM introducing myself and asking for a call back, contact information given. Will try to reach pt another time.

## 2022-12-14 NOTE — CARE COORDINATION
2nd attempt to contact pt to discuss and offer care coordination enrollment, no answer. Left a VM asking for a call back, contact information given. Will send introduction letter via [address] and will try to reach pt another time.

## 2022-12-20 DIAGNOSIS — Z79.4 TYPE 2 DIABETES MELLITUS WITHOUT COMPLICATION, WITH LONG-TERM CURRENT USE OF INSULIN (HCC): ICD-10-CM

## 2022-12-20 DIAGNOSIS — E11.9 TYPE 2 DIABETES MELLITUS WITHOUT COMPLICATION, WITH LONG-TERM CURRENT USE OF INSULIN (HCC): ICD-10-CM

## 2023-01-31 DIAGNOSIS — E11.9 TYPE 2 DIABETES MELLITUS WITHOUT COMPLICATION, WITH LONG-TERM CURRENT USE OF INSULIN (HCC): ICD-10-CM

## 2023-01-31 DIAGNOSIS — Z79.4 TYPE 2 DIABETES MELLITUS WITHOUT COMPLICATION, WITH LONG-TERM CURRENT USE OF INSULIN (HCC): ICD-10-CM

## 2023-01-31 RX ORDER — BLOOD SUGAR DIAGNOSTIC
STRIP MISCELLANEOUS
Qty: 100 EACH | Refills: 3 | Status: SHIPPED | OUTPATIENT
Start: 2023-01-31

## 2023-01-31 RX ORDER — BLOOD SUGAR DIAGNOSTIC
STRIP MISCELLANEOUS
Qty: 200 EACH | Refills: 5 | Status: SHIPPED | OUTPATIENT
Start: 2023-01-31

## 2023-03-10 ENCOUNTER — OFFICE VISIT (OUTPATIENT)
Dept: PRIMARY CARE CLINIC | Age: 78
End: 2023-03-10

## 2023-03-10 VITALS
DIASTOLIC BLOOD PRESSURE: 70 MMHG | BODY MASS INDEX: 35.17 KG/M2 | TEMPERATURE: 97.7 F | OXYGEN SATURATION: 98 % | SYSTOLIC BLOOD PRESSURE: 162 MMHG | WEIGHT: 206 LBS | HEART RATE: 74 BPM | HEIGHT: 64 IN

## 2023-03-10 DIAGNOSIS — R29.898 WEAKNESS OF BOTH LOWER LIMBS: Primary | ICD-10-CM

## 2023-03-10 DIAGNOSIS — I10 BENIGN ESSENTIAL HYPERTENSION: ICD-10-CM

## 2023-03-10 DIAGNOSIS — Z79.4 TYPE 2 DIABETES MELLITUS WITH STAGE 3A CHRONIC KIDNEY DISEASE, WITH LONG-TERM CURRENT USE OF INSULIN (HCC): ICD-10-CM

## 2023-03-10 DIAGNOSIS — N18.31 TYPE 2 DIABETES MELLITUS WITH STAGE 3A CHRONIC KIDNEY DISEASE, WITH LONG-TERM CURRENT USE OF INSULIN (HCC): ICD-10-CM

## 2023-03-10 DIAGNOSIS — Z79.899 LONG TERM CURRENT USE OF THERAPEUTIC DRUG: ICD-10-CM

## 2023-03-10 DIAGNOSIS — R60.0 LOCALIZED EDEMA: ICD-10-CM

## 2023-03-10 DIAGNOSIS — E66.01 SEVERE OBESITY (BMI 35.0-39.9) WITH COMORBIDITY (HCC): ICD-10-CM

## 2023-03-10 DIAGNOSIS — E55.9 VITAMIN D DEFICIENCY: ICD-10-CM

## 2023-03-10 DIAGNOSIS — E78.2 MIXED HYPERLIPIDEMIA: ICD-10-CM

## 2023-03-10 DIAGNOSIS — R53.83 OTHER FATIGUE: ICD-10-CM

## 2023-03-10 DIAGNOSIS — N18.31 STAGE 3A CHRONIC KIDNEY DISEASE (HCC): ICD-10-CM

## 2023-03-10 DIAGNOSIS — E11.22 TYPE 2 DIABETES MELLITUS WITH STAGE 3A CHRONIC KIDNEY DISEASE, WITH LONG-TERM CURRENT USE OF INSULIN (HCC): ICD-10-CM

## 2023-03-10 RX ORDER — GABAPENTIN 300 MG/1
CAPSULE ORAL NIGHTLY
COMMUNITY
Start: 2023-01-10 | End: 2023-07-09

## 2023-03-10 RX ORDER — METOPROLOL SUCCINATE 50 MG/1
50 TABLET, EXTENDED RELEASE ORAL DAILY
Qty: 90 TABLET | Refills: 1 | Status: SHIPPED | OUTPATIENT
Start: 2023-03-10

## 2023-03-10 RX ORDER — FUROSEMIDE 20 MG/1
20 TABLET ORAL DAILY
Qty: 90 TABLET | Refills: 1 | Status: SHIPPED | OUTPATIENT
Start: 2023-03-10

## 2023-03-10 RX ORDER — LISINOPRIL AND HYDROCHLOROTHIAZIDE 20; 12.5 MG/1; MG/1
1 TABLET ORAL 2 TIMES DAILY
Qty: 180 TABLET | Refills: 1 | Status: SHIPPED | OUTPATIENT
Start: 2023-03-10

## 2023-03-10 ASSESSMENT — PATIENT HEALTH QUESTIONNAIRE - PHQ9
SUM OF ALL RESPONSES TO PHQ QUESTIONS 1-9: 0
SUM OF ALL RESPONSES TO PHQ9 QUESTIONS 1 & 2: 0
2. FEELING DOWN, DEPRESSED OR HOPELESS: 0
SUM OF ALL RESPONSES TO PHQ QUESTIONS 1-9: 0
1. LITTLE INTEREST OR PLEASURE IN DOING THINGS: 0

## 2023-03-10 ASSESSMENT — ENCOUNTER SYMPTOMS
COUGH: 0
SHORTNESS OF BREATH: 0
CHEST TIGHTNESS: 0
DIARRHEA: 1
EYE PAIN: 0
RHINORRHEA: 0
BLOOD IN STOOL: 0
SORE THROAT: 0
VOMITING: 0
CONSTIPATION: 0
ABDOMINAL PAIN: 0
NAUSEA: 0

## 2023-03-10 NOTE — PROGRESS NOTES
[facility]) Physicians   Internal Medicine     3/10/2023  Burton Cummings : 1945 Sex: female  Age:65 y.o. Chief Complaint   Patient presents with    Extremity Weakness    Diabetes    Hypertension     Discuss medication, patient states BP yesterday was 88/49. HPI:   Patient presents to office for evaluation of the following medical concerns. -  has been having leg weakness b/l and aches in b/l legs since (3/2022) , now left more than right. States also with swelling. States aches are worse at night. States having issues with getting up, no strength in legs. States some numbness.  has been taking ibuprofen multiple time a day. 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 Has seen neurology at [facility] feel diabetic amyotrophy, had EMG (2022). Started on gabapentin on 300mg once a day due to side effects. 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 Some improved left leg weakness.    -  has LE edema.  has been taking lasix. States wearing compression stockings.     -  has been diagnosed with macular degeneration.  has seen retinal specialist and receiving injections. Possible Unsure if Retinopathy. 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 Following with endocrinology for Diabetes.  monitors sugars at home. Last visit with endocrinology per reviewed report (2022) States on 70/30 insulin decreased to 24 units in am and 15 units in pm. On metformin. Stopped Tradjenta. Adjusted novalog Slide scale if over 200.    has had episodes of hypoglycemia since last visit. Not always compliant with diet. Last a1c was 8.4 (11/2022)     - States has hyperlipidemia. On atorvastatin for high intensity statin. Trying to watch diet. No reported side effects. Frequently forgets to take at bedtime.    - States has high blood pressure. States does randomly check blood pressure at home. States 130/80's. On lisinopril and HCTZ and metoprolol. On hydralazine. States has some fatigue. Was seen in ER (5/22) -blood pressure at home evaluated with labs chest x-ray EKG States had hydralazine added and was recently increased to 25mg three times. by another pcp in [address] [place] [address] (6/2022). States self decreased hydralazine to daily     - States right shoulder pain has improved. - Labs show kidney dysfunction, last lab (11/2022) - still present      Health Maintenance   - immunizations:   Influenza Vaccination - ([id number]), (2022)   Pneumonia Vaccination - (2018) - prevnar, (12/2019) - pneumococcal 23   Zoster/Shingles Vaccine  Tetanus Vaccination  covid (2/2/2021) #1, (2/23/2021) #2, (9/28/2021) #3  - Sarbjit Fuentes, (11/3/2022) - bivalent pfizer     - Screenings:   Bone Density Scan - (2012) - normal  Pelvic/Pap Exam - (2014), (2015) - gyn  Mammogram - (2015) - gyn    Colonoscopy - declines  FIT (8/2019) - negative, (4/2021) - order     Couseled on Home Safety  - (6/9/2017)    Dilated Eye Exam - (12/2017) - Wet AMD, diabetes, cataract, (5/2018) - no Retinopathy, wet AMD (11/2018), (1/23) -wet age related macular degeneration left dry age macular degeneration right, eylea left f/u 8wks     Mri lumbar (5/2022) - Multilevel lumbar degenerative disc disease and facet joint   arthrosis. Minimal retrolisthesis at the L2-L3 and L3-L4 levels. 2.  Multilevel central canal stenosis most pronounced at the L3-L4 levels. 3.  Multilevel degenerative neural foraminal narrowing, most pronounced   the L5-S1 level.      Chen (6/22) - norm rt & lt, Moderately decreased right toe brachial index at 0.57. Normal left toe brachial index measuring 0.82. Abnormal digital waveforms is specially in the right 2nd and 3rd digits. may be related to small vessel distal disease    ROS:  Review of Systems   Constitutional:  Negative for appetite change, chills, fever and unexpected weight change. HENT:  Negative for congestion, rhinorrhea and sore throat. Eyes:  Negative for pain and visual disturbance. Respiratory:  Negative for cough, chest tightness and shortness of breath. Cardiovascular:  Negative for chest pain and palpitations. Gastrointestinal:  Positive for diarrhea. Negative for abdominal pain, blood in stool, constipation, nausea and vomiting. Genitourinary:  Negative for difficulty urinating, dysuria, frequency, pelvic pain, urgency and vaginal bleeding. Musculoskeletal:  Negative for arthralgias and myalgias. Skin:  Negative for rash. Neurological:  Negative for dizziness, syncope, weakness, light-headedness, numbness and headaches. Hematological:  Negative for adenopathy. Psychiatric/Behavioral:  Negative for suicidal ideas. The patient is not nervous/anxious.         Current Outpatient Medications:     gabapentin (NEURONTIN) 300 MG capsule, Take by mouth nightly., Disp: , Rfl:     metoprolol succinate (TOPROL XL) 50 MG extended release tablet, Take 1 tablet by mouth daily, Disp: 90 tablet, Rfl: 1    lisinopril-hydroCHLOROthiazide (PRINZIDE;ZESTORETIC) 20-12.5 MG per tablet, Take 1 tablet by mouth 2 times daily, Disp: 180 tablet, Rfl: 1    furosemide (LASIX) 20 MG tablet, Take 1 tablet by mouth daily, Disp: 90 tablet, Rfl: 1    Insulin Syringe-Needle U-100 (BD INSULIN SYRINGE U/F) 31G X 5/16\" 0.5 ML MISC, USE AS DIRECTED TO TAKE INSULIN 2 TIMES A DAY, Disp: 100 each, Rfl: 3    blood glucose test strips (ONETOUCH VERIO) strip, Use to test blood sugar 4 times a day., Disp: 200 each, Rfl: 5    metFORMIN (GLUCOPHAGE) 500 MG tablet, Take 1 tablet by mouth 2 times daily (with meals), Disp: 180 tablet, Rfl: 1    atorvastatin (LIPITOR) 40 MG tablet, Take 1 tablet by mouth every 24 hours, Disp: 90 tablet, Rfl: 1    hydrALAZINE (APRESOLINE) 25 MG tablet, Take 1 tablet by mouth 3 times daily, Disp: 270 tablet, Rfl: 1    Handicap Placard MISC, by Does not apply route Duration - lifetime Expiration - 5 years - 2027, Disp: 1 each, Rfl: 0    insulin 70-30 (NOVOLIN 70/30) (70-30) 100 UNIT per ML injection vial, 26 units with breakfast and 15 units before dinner, Disp: 40 mL, Rfl: 3    insulin aspart (NOVOLOG) 100 UNIT/ML injection vial, Take for correction of B unit for every 30 mg/dl if the BG is >140 mg/dl (max of 15 units/day), Disp: 10 mL, Rfl: 3    Lancets MISC, Use to check BG 1-2x/day, Disp: 200 each, Rfl: 3    Multiple Vitamins-Minerals (PRESERVISION AREDS 2 PO), Take by mouth 2 times daily, Disp: , Rfl:     vitamin D (CHOLECALCIFEROL) 1000 UNIT TABS tablet, Take 1,000 Units by mouth daily, Disp: , Rfl:     Allergies   Allergen Reactions    Penicillins Hives and Other (See Comments)     EDEMA EXTREMITIES       Past Medical History:   Diagnosis Date    Bilateral nonexudative age-related macular degeneration 2019    HLD (hyperlipidemia)     HTN (hypertension)     Stage 2 chronic kidney disease 2019    T2DM (type 2 diabetes mellitus) ([facility]     Uterine cancer ([facility]     s/p surgery    Vitamin D deficiency 2019       Past Surgical History:   Procedure Laterality Date    HYSTERECTOMY, TOTAL ABDOMINAL (CERVIX REMOVED)      TONSILLECTOMY         Family History   Problem Relation Age of Onset    Heart Disease Mother         late onset,  in 80's    Coronary Art Dis Mother     Heart Disease Father         late onset,  in 80's    Dementia Father     Coronary Art Dis Father     No Known Problems Son     No Known Problems Son     No Known Problems Son        Social History     Socioeconomic History    Marital status:      Spouse name: Not on file    Number of children: 3    Years of education: Not on file    Highest education level: Not on file   Occupational History    Occupation:    Tobacco Use    Smoking status: Never    Smokeless tobacco: Never   Substance and Sexual Activity    Alcohol use: No     Comment: Social very rare    Drug use: No    Sexual activity: Never   Other Topics Concern    Not on file   Social History Narrative    Lives in a house in [facility] alone     Social Determinants of Health     Financial Resource Strain: Low Risk     Difficulty of Paying Living Expenses: Not hard at all   Food Insecurity: No Food Insecurity    Worried About Running Out of Food in the Last Year: Never true    [address] in the Last Year: Never true   Transportation Needs: Not on file   Physical Activity: Insufficiently Active    Days of Exercise per Week: 3 days    Minutes of Exercise per Session: 30 min   Stress: Not on file   Social Connections: Not on file   Intimate Partner Violence: Not on file   Housing Stability: Not on file       Vitals:    03/10/23 0940 03/10/23 1002   BP: (!) 188/80  Comment: Patient states she had taken BP medication @ 7a. m. today (!) 168/70   Site: Left Upper Arm Right Upper Arm   Position: Sitting Sitting   Cuff Size: Medium Adult Medium Adult   Pulse: 74    Temp: 97.7 °F (36.5 °C)    TempSrc: Temporal    SpO2: 98%    Weight: 206 lb (93.4 kg)    Height: 5' 4\" (1.626 m)        Exam:  Physical Exam  Vitals reviewed. Constitutional:       Appearance: She is well-developed. HENT:      Head: Normocephalic and atraumatic. Right Ear: External ear normal.      Left Ear: External ear normal.   Eyes:      Pupils: Pupils are equal, round, and reactive to light. Neck:      Thyroid: No thyromegaly. Cardiovascular:      Rate and Rhythm: Normal rate and regular rhythm. Heart sounds: Normal heart sounds. No murmur heard. Pulmonary:      Effort: Pulmonary effort is normal.      Breath sounds: Normal breath sounds.  No wheezing. Abdominal:      General: Bowel sounds are normal. There is no distension. Palpations: Abdomen is soft. Tenderness: There is no abdominal tenderness. There is no guarding or rebound. Musculoskeletal:         General: Normal range of motion. Cervical back: Normal range of motion and neck supple. Right lower leg: Edema present. Left lower leg: Edema present. Comments: Tenderness to thigh b/l   weakness to resistance of LE b/l    Lymphadenopathy:      Cervical: No cervical adenopathy. Skin:     General: Skin is warm and dry. Neurological:      Mental Status: She is alert and oriented to person, place, and time. Cranial Nerves: No cranial nerve deficit.    Psychiatric:         Judgment: Judgment normal.       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  - had MRI lumbar spine (5/2022)   - had EMG   - [place] vascular borderline on the right (6/2022  - on gabapentin   - had home PT   - in outpatient PT   - some improved     Type 2 diabetes mellitus with stage 3a chronic kidney disease, with long-term current use of insulin ([address]  - following with endocrinology   - monitor sugar at home  - now on 70/30 insulin 24 units in am and 15 units in pm  - on metformin   - monitor for hypoglycemia and action plan  - Last A1c was 8.4 (11/2022)  - non compliant with diet  - following with endocrinology   - discussed to ask endocrinology about invokana for DM kidney disease risk reduction and cardiovascular risk reduction     follows with optho (1/23) -wet age related macular degeneration left dry age macular degeneration right, eylea left f/u 8wks macular OD  on statin  on asa  On ACE     Benign essential hypertension  - monitor blood pressure at home  - watch diet   - declines amlodipine   - on metoprolol  - on lisinopril-HCTZ  - on hydralazine increased to 25mg three times a day - only taking once a day   - states on lasix - states blood pressure will drop when takes   - suggested nephrology - declines 3/10/2023  - elevated today 3/10/2023    Mixed hyperlipidemia  - watch diet  - on atorvastatin 40mg - high intensity statin  - follow labs   - last lab (11/2022)   - had held statin to see if leg weakness would improve - no change - recommend restart of statin     Localized edema  - as above     Vitamin D deficiency  - on otc supplement  - follow labs     Endometrial intraepithelial neoplasia (EIN)  -s/p surgery  - follows with gyn    Stage 3a chronic kidney disease ([facility]  - Noted on labs   - possibly multifactorial (HTN, DM)  - on ACE   - may need to consider nephro referral - declines   - monitor labs - last (11/2022)       Return in about 3 months (around 6/10/2023) for check up and review and labs.     Orders Placed This Encounter   Procedures    Comprehensive Metabolic Panel     Standing Status:   Future     Standing Expiration Date:   3/10/2024    Magnesium     Standing Status:   Future     Standing Expiration Date:   3/10/2024    Lipid, Fasting     Standing Status:   Future     Standing Expiration Date:   3/10/2024    Hemoglobin A1C     Standing Status:   Future     Standing Expiration Date:   3/9/2024    Vitamin D 25 Hydroxy     Standing Status:   Future     Standing Expiration Date:   3/10/2024    TSH     Standing Status:   Future     Standing Expiration Date:   3/10/2024    Urinalysis     Standing Status:   Future     Standing Expiration Date:   3/10/2024    Microalbumin / Creatinine Urine Ratio     Standing Status:   Future     Standing Expiration Date:   3/9/2024    Vitamin B12 & Folate     Standing Status:   Future     Standing Expiration Date:   3/10/2024    CBC with Auto Differential     Standing Status:   Future     Standing Expiration Date:   3/10/2024     Requested Prescriptions     Signed Prescriptions Disp Refills metoprolol succinate (TOPROL XL) 50 MG extended release tablet 90 tablet 1     Sig: Take 1 tablet by mouth daily    lisinopril-hydroCHLOROthiazide (PRINZIDE;ZESTORETIC) 20-12.5 MG per tablet 180 tablet 1     Sig: Take 1 tablet by mouth 2 times daily    furosemide (LASIX) 20 MG tablet 90 tablet 1     Sig: Take 1 tablet by mouth daily       Sol Ferrera MD  3/10/2023  10:31 AM

## 2023-03-28 ENCOUNTER — OFFICE VISIT (OUTPATIENT)
Dept: ENDOCRINOLOGY | Age: 78
End: 2023-03-28
Payer: MEDICARE

## 2023-03-28 VITALS
HEART RATE: 88 BPM | BODY MASS INDEX: 35.17 KG/M2 | SYSTOLIC BLOOD PRESSURE: 170 MMHG | OXYGEN SATURATION: 99 % | WEIGHT: 206 LBS | DIASTOLIC BLOOD PRESSURE: 86 MMHG | HEIGHT: 64 IN

## 2023-03-28 DIAGNOSIS — E11.65 UNCONTROLLED TYPE 2 DIABETES MELLITUS WITH HYPERGLYCEMIA (HCC): Primary | ICD-10-CM

## 2023-03-28 DIAGNOSIS — Z79.4 TYPE 2 DIABETES MELLITUS WITHOUT COMPLICATION, WITH LONG-TERM CURRENT USE OF INSULIN (HCC): ICD-10-CM

## 2023-03-28 DIAGNOSIS — Z79.4 TYPE 2 DIABETES MELLITUS WITH STAGE 2 CHRONIC KIDNEY DISEASE, WITH LONG-TERM CURRENT USE OF INSULIN (HCC): ICD-10-CM

## 2023-03-28 DIAGNOSIS — E11.9 TYPE 2 DIABETES MELLITUS WITHOUT COMPLICATION, WITH LONG-TERM CURRENT USE OF INSULIN (HCC): ICD-10-CM

## 2023-03-28 DIAGNOSIS — N18.2 TYPE 2 DIABETES MELLITUS WITH STAGE 2 CHRONIC KIDNEY DISEASE, WITH LONG-TERM CURRENT USE OF INSULIN (HCC): ICD-10-CM

## 2023-03-28 DIAGNOSIS — E55.9 VITAMIN D DEFICIENCY: ICD-10-CM

## 2023-03-28 DIAGNOSIS — E78.2 MIXED HYPERLIPIDEMIA: ICD-10-CM

## 2023-03-28 DIAGNOSIS — E11.22 TYPE 2 DIABETES MELLITUS WITH STAGE 2 CHRONIC KIDNEY DISEASE, WITH LONG-TERM CURRENT USE OF INSULIN (HCC): ICD-10-CM

## 2023-03-28 DIAGNOSIS — I10 ESSENTIAL HYPERTENSION: ICD-10-CM

## 2023-03-28 LAB — HBA1C MFR BLD: 8.1 %

## 2023-03-28 PROCEDURE — 99214 OFFICE O/P EST MOD 30 MIN: CPT | Performed by: CLINICAL NURSE SPECIALIST

## 2023-03-28 PROCEDURE — 3077F SYST BP >= 140 MM HG: CPT | Performed by: CLINICAL NURSE SPECIALIST

## 2023-03-28 PROCEDURE — 83036 HEMOGLOBIN GLYCOSYLATED A1C: CPT | Performed by: CLINICAL NURSE SPECIALIST

## 2023-03-28 PROCEDURE — 1123F ACP DISCUSS/DSCN MKR DOCD: CPT | Performed by: CLINICAL NURSE SPECIALIST

## 2023-03-28 PROCEDURE — 3052F HG A1C>EQUAL 8.0%<EQUAL 9.0%: CPT | Performed by: CLINICAL NURSE SPECIALIST

## 2023-03-28 PROCEDURE — 3079F DIAST BP 80-89 MM HG: CPT | Performed by: CLINICAL NURSE SPECIALIST

## 2023-03-28 RX ORDER — BLOOD SUGAR DIAGNOSTIC
STRIP MISCELLANEOUS
Qty: 200 EACH | Refills: 5 | Status: SHIPPED
Start: 2023-03-28 | End: 2023-03-28 | Stop reason: SDUPTHER

## 2023-03-28 RX ORDER — BLOOD SUGAR DIAGNOSTIC
STRIP MISCELLANEOUS
Qty: 200 EACH | Refills: 5 | Status: SHIPPED | OUTPATIENT
Start: 2023-03-28

## 2023-03-28 NOTE — PROGRESS NOTES
[address] [place] [address] Department of Endocrinology Diabetes and Metabolism   [address] [facility] [address]   Phone: [phone]  Fax: [phone]    Date of Service: 3/28/2023    Primary Care Physician: Charbel Quesada MD  Referring physician: No ref. provider found  Provider: MARKEL Watts CNS     Reason for the visit:  Type 2 DM       History of Present Illness: The history is provided by the patient. No  was used. Accuracy of the patient data is excellent. Amy Joel is a very pleasant 68 y.o. female seen today for diabetes management     Amy Joel was diagnosed with diabetes in 1995 and currently on Metformin 500 mg BID, Novolin 70/30 24 units in AM and 15 units in PM,  Novolog mod dose ISS. She holds her 70/30  If BS is < 100.            The patient has been checking blood sugar 2-3  times a day and readings are mostly at goal  Recently diagnosed with diabetic amyotrophy  Following with [facility] neurology   Receiving physical therapy 2 days a week    Most recent A1c results summarized below  Lab Results   Component Value Date/Time    LABA1C 8.1 03/28/2023 03:33 PM    LABA1C 8.4 11/04/2022 10:38 AM    LABA1C 7.7 07/26/2022 03:32 PM       Patient has had infrequent  hypoglycemic episodes usually before dinner    The patient has been mindful of what has been eating and is following a diabetes diet    I reviewed current medications and the patient has no issues with diabetes medications  Amy Joel is up to date with eye exam and denied any history of diabetic retinopathy   Last eye exam was 9/2022 , no h/o diabetic retinopathy  The patient performs own feet care  Microvascular complications:  No Retinopathy, stage 3 CKD  or Neuropathy   Macrovascular complications: no CAD, PVD, or Stroke  PAST MEDICAL HISTORY   Past Medical History:   Diagnosis Date    Bilateral nonexudative age-related macular degeneration 5/9/2019    HLD (hyperlipidemia)

## 2023-06-21 DIAGNOSIS — E11.22 TYPE 2 DIABETES MELLITUS WITH STAGE 2 CHRONIC KIDNEY DISEASE, WITH LONG-TERM CURRENT USE OF INSULIN (HCC): ICD-10-CM

## 2023-06-21 DIAGNOSIS — N18.2 TYPE 2 DIABETES MELLITUS WITH STAGE 2 CHRONIC KIDNEY DISEASE, WITH LONG-TERM CURRENT USE OF INSULIN (HCC): ICD-10-CM

## 2023-06-21 DIAGNOSIS — Z79.4 TYPE 2 DIABETES MELLITUS WITH STAGE 2 CHRONIC KIDNEY DISEASE, WITH LONG-TERM CURRENT USE OF INSULIN (HCC): ICD-10-CM

## 2023-07-15 ENCOUNTER — HOSPITAL ENCOUNTER (OUTPATIENT)
Age: 78
Discharge: HOME | End: 2023-07-15
Payer: MEDICARE

## 2023-07-15 DIAGNOSIS — E11.65 UNCONTROLLED TYPE 2 DIABETES MELLITUS WITH HYPERGLYCEMIA (HCC): ICD-10-CM

## 2023-07-15 DIAGNOSIS — N18.31 STAGE 3A CHRONIC KIDNEY DISEASE (HCC): ICD-10-CM

## 2023-07-15 DIAGNOSIS — E55.9 VITAMIN D DEFICIENCY: ICD-10-CM

## 2023-07-15 DIAGNOSIS — E11.22 TYPE 2 DIABETES MELLITUS WITH STAGE 3A CHRONIC KIDNEY DISEASE, WITH LONG-TERM CURRENT USE OF INSULIN (HCC): ICD-10-CM

## 2023-07-15 DIAGNOSIS — Z79.4 TYPE 2 DIABETES MELLITUS WITH STAGE 3A CHRONIC KIDNEY DISEASE, WITH LONG-TERM CURRENT USE OF INSULIN (HCC): ICD-10-CM

## 2023-07-15 DIAGNOSIS — R53.83 OTHER FATIGUE: ICD-10-CM

## 2023-07-15 DIAGNOSIS — E78.2 MIXED HYPERLIPIDEMIA: ICD-10-CM

## 2023-07-15 DIAGNOSIS — Z79.899 LONG TERM CURRENT USE OF THERAPEUTIC DRUG: ICD-10-CM

## 2023-07-15 DIAGNOSIS — N18.31 TYPE 2 DIABETES MELLITUS WITH STAGE 3A CHRONIC KIDNEY DISEASE, WITH LONG-TERM CURRENT USE OF INSULIN (HCC): ICD-10-CM

## 2023-07-15 LAB
25(OH)D3 SERPL-MCNC: 40.5 NG/ML (ref 30–100)
ALBUMIN SERPL-MCNC: 3.9 G/DL (ref 3.5–5.2)
ALP SERPL-CCNC: 164 U/L (ref 35–104)
ALT SERPL-CCNC: 12 U/L (ref 0–32)
ANION GAP SERPL CALCULATED.3IONS-SCNC: 11 MMOL/L (ref 7–16)
AST SERPL-CCNC: 17 U/L (ref 0–31)
BASOPHILS # BLD: 0.05 K/UL (ref 0–0.2)
BASOPHILS NFR BLD: 1 % (ref 0–2)
BILIRUB SERPL-MCNC: 0.3 MG/DL (ref 0–1.2)
BILIRUB UR QL STRIP: NEGATIVE
BUN SERPL-MCNC: 25 MG/DL (ref 6–23)
CALCIUM SERPL-MCNC: 9.9 MG/DL (ref 8.6–10.2)
CHLORIDE SERPL-SCNC: 100 MMOL/L (ref 98–107)
CHOLEST SERPL-MCNC: 152 MG/DL
CLARITY UR: CLEAR
CO2 SERPL-SCNC: 29 MMOL/L (ref 22–29)
COLOR UR: YELLOW
CREAT SERPL-MCNC: 1.3 MG/DL (ref 0.5–1)
EOSINOPHIL # BLD: 0.26 K/UL (ref 0.05–0.5)
EOSINOPHILS RELATIVE PERCENT: 4 % (ref 0–6)
ERYTHROCYTE [DISTWIDTH] IN BLOOD BY AUTOMATED COUNT: 13.2 % (ref 11.5–15)
GFR SERPL CREATININE-BSD FRML MDRD: 43 ML/MIN/1.73M2
GLUCOSE SERPL-MCNC: 167 MG/DL (ref 74–99)
GLUCOSE UR STRIP-MCNC: NEGATIVE MG/DL
HBA1C MFR BLD: 9.3 % (ref 4–5.6)
HCT VFR BLD AUTO: 38.5 % (ref 34–48)
HDLC SERPL-MCNC: 51 MG/DL
HGB BLD-MCNC: 12.2 G/DL (ref 11.5–15.5)
HGB UR QL STRIP.AUTO: NEGATIVE
IMM GRANULOCYTES # BLD AUTO: <0.03 K/UL (ref 0–0.58)
IMM GRANULOCYTES NFR BLD: 0 % (ref 0–5)
KETONES UR STRIP-MCNC: NEGATIVE MG/DL
LDLC SERPL CALC-MCNC: 76 MG/DL
LEUKOCYTE ESTERASE UR QL STRIP: ABNORMAL
LYMPHOCYTES # BLD: 26 % (ref 20–42)
LYMPHOCYTES NFR BLD: 1.67 K/UL (ref 1.5–4)
MAGNESIUM SERPL-MCNC: 1.5 MG/DL (ref 1.6–2.6)
MCH RBC QN AUTO: 28.3 PG (ref 26–35)
MCHC RBC AUTO-ENTMCNC: 31.7 G/DL (ref 32–34.5)
MCV RBC AUTO: 89.3 FL (ref 80–99.9)
MONOCYTES NFR BLD: 0.38 K/UL (ref 0.1–0.95)
MONOCYTES NFR BLD: 6 % (ref 2–12)
NEUTROPHILS NFR BLD: 64 % (ref 43–80)
NEUTS SEG NFR BLD: 4.17 K/UL (ref 1.8–7.3)
NITRITE UR QL STRIP: NEGATIVE
PH UR STRIP: 6 [PH] (ref 5–9)
PLATELET # BLD AUTO: 195 K/UL (ref 130–450)
PMV BLD AUTO: 10.8 FL (ref 7–12)
POTASSIUM SERPL-SCNC: 3.1 MMOL/L (ref 3.5–5)
PROT SERPL-MCNC: 6.7 G/DL (ref 6.4–8.3)
PROT UR STRIP-MCNC: NEGATIVE MG/DL
RBC # BLD AUTO: 4.31 M/UL (ref 3.5–5.5)
RBC #/AREA URNS HPF: NORMAL /HPF
SODIUM SERPL-SCNC: 140 MMOL/L (ref 132–146)
SP GR UR STRIP: <1.005 (ref 1–1.03)
TRIGL SERPL-MCNC: 123 MG/DL
TSH SERPL DL<=0.05 MIU/L-ACNC: 3.68 UIU/ML (ref 0.27–4.2)
UROBILINOGEN UR STRIP-ACNC: 0.2 EU/DL (ref 0–1)
VLDLC SERPL CALC-MCNC: 25 MG/DL
WBC #/AREA URNS HPF: NORMAL /HPF
WBC OTHER # BLD: 6.6 K/UL (ref 4.5–11.5)

## 2023-07-15 PROCEDURE — 80061 LIPID PANEL: CPT

## 2023-07-15 PROCEDURE — 83735 ASSAY OF MAGNESIUM: CPT

## 2023-07-15 PROCEDURE — 82043 UR ALBUMIN QUANTITATIVE: CPT

## 2023-07-15 PROCEDURE — 82607 VITAMIN B-12: CPT

## 2023-07-15 PROCEDURE — 82570 ASSAY OF URINE CREATININE: CPT

## 2023-07-15 PROCEDURE — 85027 COMPLETE CBC AUTOMATED: CPT

## 2023-07-15 PROCEDURE — 83036 HEMOGLOBIN GLYCOSYLATED A1C: CPT

## 2023-07-15 PROCEDURE — 80053 COMPREHEN METABOLIC PANEL: CPT

## 2023-07-15 PROCEDURE — 82746 ASSAY OF FOLIC ACID SERUM: CPT

## 2023-07-15 PROCEDURE — 84443 ASSAY THYROID STIM HORMONE: CPT

## 2023-07-15 PROCEDURE — 82306 VITAMIN D 25 HYDROXY: CPT

## 2023-07-15 PROCEDURE — 81001 URINALYSIS AUTO W/SCOPE: CPT

## 2023-07-16 LAB
CREAT UR-MCNC: 116.1 MG/DL (ref 29–226)
FOLATE SERPL-MCNC: >20 NG/ML (ref 4.8–24.2)
MICROALBUMIN UR-MCNC: 27 MG/L (ref 0–19)
MICROALBUMIN/CREAT UR-RTO: 23 MCG/MG CREAT (ref 0–30)
VIT B12 SERPL-MCNC: 604 PG/ML (ref 211–946)

## 2023-07-18 ENCOUNTER — TELEPHONE (OUTPATIENT)
Dept: FAMILY MEDICINE CLINIC | Age: 78
End: 2023-07-18

## 2023-07-18 NOTE — TELEPHONE ENCOUNTER
Patient asking if she may take a multivitamin instead of potassium and magnesium separately? If not, what dosage of each should she take?

## 2023-07-18 NOTE — TELEPHONE ENCOUNTER
I would not be certain if a multivitamin would have enough of the supplement in there to correct the issues/.  If she wanted to try a multivitamin I would do that regularly at least for the next 3 months and then recheck electrolytes then to see if there is values corrected

## 2023-07-18 NOTE — TELEPHONE ENCOUNTER
Please let the patient know that blood work results showed    Potassium level was low, would consider potassium supplement    Magnesium level was also low and would recommend magnesium supplement    Labs still show kidney dysfunction similar when compared to previous    Sugar level was elevated.   Hemoglobin A1c is a measure of 3-month sugar control was uncontrolled at 9.3    Urine analysis showed slight white cells and slight microscopic protein    Vitamin W09 and folic acid levels were normal    Blood counts were normal    Vitamin D levels were normal    Cholesterol levels were improved when compared to previous    Thyroid levels were normal    Other electrolytes and liver functions were normal    Thanks

## 2023-08-29 DIAGNOSIS — Z79.4 TYPE 2 DIABETES MELLITUS WITHOUT COMPLICATION, WITH LONG-TERM CURRENT USE OF INSULIN (HCC): ICD-10-CM

## 2023-08-29 DIAGNOSIS — E11.9 TYPE 2 DIABETES MELLITUS WITHOUT COMPLICATION, WITH LONG-TERM CURRENT USE OF INSULIN (HCC): ICD-10-CM

## 2023-08-29 RX ORDER — SYRINGE-NEEDLE,INSULIN,0.5 ML 27GX1/2"
SYRINGE, EMPTY DISPOSABLE MISCELLANEOUS
Qty: 200 EACH | Refills: 3 | Status: SHIPPED | OUTPATIENT
Start: 2023-08-29

## 2023-09-15 ENCOUNTER — OFFICE VISIT (OUTPATIENT)
Dept: PRIMARY CARE CLINIC | Age: 78
End: 2023-09-15

## 2023-09-15 VITALS
DIASTOLIC BLOOD PRESSURE: 88 MMHG | TEMPERATURE: 97.2 F | OXYGEN SATURATION: 98 % | SYSTOLIC BLOOD PRESSURE: 138 MMHG | HEIGHT: 64 IN | BODY MASS INDEX: 35 KG/M2 | HEART RATE: 76 BPM | WEIGHT: 205 LBS

## 2023-09-15 VITALS
HEART RATE: 76 BPM | WEIGHT: 205 LBS | TEMPERATURE: 97.2 F | HEIGHT: 64 IN | BODY MASS INDEX: 35 KG/M2 | DIASTOLIC BLOOD PRESSURE: 88 MMHG | SYSTOLIC BLOOD PRESSURE: 138 MMHG | OXYGEN SATURATION: 98 %

## 2023-09-15 DIAGNOSIS — R60.0 LOCALIZED EDEMA: ICD-10-CM

## 2023-09-15 DIAGNOSIS — E11.22 TYPE 2 DIABETES MELLITUS WITH STAGE 3A CHRONIC KIDNEY DISEASE, WITH LONG-TERM CURRENT USE OF INSULIN (HCC): ICD-10-CM

## 2023-09-15 DIAGNOSIS — R29.898 WEAKNESS OF BOTH LOWER LIMBS: Primary | ICD-10-CM

## 2023-09-15 DIAGNOSIS — I10 BENIGN ESSENTIAL HYPERTENSION: ICD-10-CM

## 2023-09-15 DIAGNOSIS — Z79.4 TYPE 2 DIABETES MELLITUS WITH STAGE 3A CHRONIC KIDNEY DISEASE, WITH LONG-TERM CURRENT USE OF INSULIN (HCC): ICD-10-CM

## 2023-09-15 DIAGNOSIS — N18.31 TYPE 2 DIABETES MELLITUS WITH STAGE 3A CHRONIC KIDNEY DISEASE, WITH LONG-TERM CURRENT USE OF INSULIN (HCC): ICD-10-CM

## 2023-09-15 DIAGNOSIS — Z00.00 MEDICARE ANNUAL WELLNESS VISIT, SUBSEQUENT: Primary | ICD-10-CM

## 2023-09-15 DIAGNOSIS — Z79.899 LONG TERM CURRENT USE OF THERAPEUTIC DRUG: ICD-10-CM

## 2023-09-15 DIAGNOSIS — N18.31 STAGE 3A CHRONIC KIDNEY DISEASE (HCC): ICD-10-CM

## 2023-09-15 DIAGNOSIS — E55.9 VITAMIN D DEFICIENCY: ICD-10-CM

## 2023-09-15 DIAGNOSIS — R53.83 OTHER FATIGUE: ICD-10-CM

## 2023-09-15 DIAGNOSIS — E78.2 MIXED HYPERLIPIDEMIA: ICD-10-CM

## 2023-09-15 RX ORDER — LISINOPRIL AND HYDROCHLOROTHIAZIDE 20; 12.5 MG/1; MG/1
1 TABLET ORAL 2 TIMES DAILY
Qty: 180 TABLET | Refills: 1 | Status: SHIPPED | OUTPATIENT
Start: 2023-09-15

## 2023-09-15 RX ORDER — METOPROLOL SUCCINATE 50 MG/1
50 TABLET, EXTENDED RELEASE ORAL DAILY
Qty: 90 TABLET | Refills: 1 | Status: SHIPPED | OUTPATIENT
Start: 2023-09-15

## 2023-09-15 RX ORDER — FUROSEMIDE 20 MG/1
20 TABLET ORAL DAILY
Qty: 90 TABLET | Refills: 1 | Status: SHIPPED | OUTPATIENT
Start: 2023-09-15

## 2023-09-15 SDOH — ECONOMIC STABILITY: INCOME INSECURITY: HOW HARD IS IT FOR YOU TO PAY FOR THE VERY BASICS LIKE FOOD, HOUSING, MEDICAL CARE, AND HEATING?: NOT HARD AT ALL

## 2023-09-15 SDOH — ECONOMIC STABILITY: FOOD INSECURITY: WITHIN THE PAST 12 MONTHS, YOU WORRIED THAT YOUR FOOD WOULD RUN OUT BEFORE YOU GOT MONEY TO BUY MORE.: NEVER TRUE

## 2023-09-15 SDOH — ECONOMIC STABILITY: HOUSING INSECURITY
IN THE LAST 12 MONTHS, WAS THERE A TIME WHEN YOU DID NOT HAVE A STEADY PLACE TO SLEEP OR SLEPT IN A SHELTER (INCLUDING NOW)?: NO

## 2023-09-15 SDOH — ECONOMIC STABILITY: FOOD INSECURITY: WITHIN THE PAST 12 MONTHS, THE FOOD YOU BOUGHT JUST DIDN'T LAST AND YOU DIDN'T HAVE MONEY TO GET MORE.: NEVER TRUE

## 2023-09-15 ASSESSMENT — LIFESTYLE VARIABLES
HOW MANY STANDARD DRINKS CONTAINING ALCOHOL DO YOU HAVE ON A TYPICAL DAY: PATIENT DOES NOT DRINK
HOW OFTEN DO YOU HAVE A DRINK CONTAINING ALCOHOL: NEVER

## 2023-09-15 ASSESSMENT — PATIENT HEALTH QUESTIONNAIRE - PHQ9
2. FEELING DOWN, DEPRESSED OR HOPELESS: 0
SUM OF ALL RESPONSES TO PHQ QUESTIONS 1-9: 0
1. LITTLE INTEREST OR PLEASURE IN DOING THINGS: 0
SUM OF ALL RESPONSES TO PHQ9 QUESTIONS 1 & 2: 0
SUM OF ALL RESPONSES TO PHQ QUESTIONS 1-9: 0

## 2023-09-15 ASSESSMENT — ENCOUNTER SYMPTOMS
VOMITING: 0
CHEST TIGHTNESS: 0
ABDOMINAL PAIN: 0
BLOOD IN STOOL: 0
SHORTNESS OF BREATH: 0
SORE THROAT: 0
EYE PAIN: 0
COUGH: 0
RHINORRHEA: 0
NAUSEA: 0
CONSTIPATION: 0
DIARRHEA: 1

## 2023-09-15 NOTE — PROGRESS NOTES
Status:   Future     Standing Expiration Date:   9/15/2024     Requested Prescriptions     Signed Prescriptions Disp Refills    furosemide (LASIX) 20 MG tablet 90 tablet 1     Sig: Take 1 tablet by mouth daily    lisinopril-hydroCHLOROthiazide (PRINZIDE;ZESTORETIC) 20-12.5 MG per tablet 180 tablet 1     Sig: Take 1 tablet by mouth 2 times daily    metoprolol succinate (TOPROL XL) 50 MG extended release tablet 90 tablet 1     Sig: Take 1 tablet by mouth daily       Lucia Kc MD  9/15/2023  9:42 AM

## 2023-09-25 ENCOUNTER — OFFICE VISIT (OUTPATIENT)
Dept: ENDOCRINOLOGY | Age: 78
End: 2023-09-25
Payer: MEDICARE

## 2023-09-25 VITALS
HEART RATE: 89 BPM | SYSTOLIC BLOOD PRESSURE: 164 MMHG | HEIGHT: 64 IN | WEIGHT: 205 LBS | BODY MASS INDEX: 35 KG/M2 | DIASTOLIC BLOOD PRESSURE: 75 MMHG

## 2023-09-25 DIAGNOSIS — I10 ESSENTIAL HYPERTENSION: ICD-10-CM

## 2023-09-25 DIAGNOSIS — E11.65 UNCONTROLLED TYPE 2 DIABETES MELLITUS WITH HYPERGLYCEMIA (HCC): Primary | ICD-10-CM

## 2023-09-25 DIAGNOSIS — Z79.4 TYPE 2 DIABETES MELLITUS WITHOUT COMPLICATION, WITH LONG-TERM CURRENT USE OF INSULIN (HCC): ICD-10-CM

## 2023-09-25 DIAGNOSIS — E78.2 MIXED HYPERLIPIDEMIA: ICD-10-CM

## 2023-09-25 DIAGNOSIS — E55.9 VITAMIN D DEFICIENCY: ICD-10-CM

## 2023-09-25 DIAGNOSIS — E11.9 TYPE 2 DIABETES MELLITUS WITHOUT COMPLICATION, WITH LONG-TERM CURRENT USE OF INSULIN (HCC): ICD-10-CM

## 2023-09-25 PROCEDURE — 3077F SYST BP >= 140 MM HG: CPT | Performed by: CLINICAL NURSE SPECIALIST

## 2023-09-25 PROCEDURE — 3078F DIAST BP <80 MM HG: CPT | Performed by: CLINICAL NURSE SPECIALIST

## 2023-09-25 PROCEDURE — 99214 OFFICE O/P EST MOD 30 MIN: CPT | Performed by: CLINICAL NURSE SPECIALIST

## 2023-09-25 PROCEDURE — 3046F HEMOGLOBIN A1C LEVEL >9.0%: CPT | Performed by: CLINICAL NURSE SPECIALIST

## 2023-09-25 PROCEDURE — 1123F ACP DISCUSS/DSCN MKR DOCD: CPT | Performed by: CLINICAL NURSE SPECIALIST

## 2023-09-25 NOTE — PROGRESS NOTES
[address] [facility] Department of Endocrinology Diabetes and Metabolism   [address] [facility] [address] [place] [address], [facility]  Phone: [phone]  Fax: [phone]    Date of Service: 9/25/2023    Primary Care Physician: Keny Hollingsworth MD  Referring physician: No ref. provider found  Provider: MARKEL Wharton     Reason for the visit:  Type 2 DM       History of Present Illness: The history is provided by the patient. No  was used. Accuracy of the patient data is excellent. Jad Hernández is a very pleasant 68 y.o. female seen today for diabetes management     Jad Hernández was diagnosed with diabetes in 1995 and currently on Metformin 500 mg BID, Novolin 70/30 24 units in AM and 15 units in PM,  Novolog mod dose ISS. She holds her 70/30  If BS is < 100.            The patient has been checking blood sugar 2-3  times a day and readings are mostly at goal  Recently diagnosed with diabetic amyotrophy  Following with [facility] neurology   Receiving physical therapy 2 days a week    Most recent A1c results summarized below  Lab Results   Component Value Date/Time    LABA1C 9.3 07/15/2023 08:44 AM    LABA1C 8.1 03/28/2023 03:33 PM    LABA1C 8.4 11/04/2022 10:38 AM       Patient has had infrequent  hypoglycemic episodes usually before dinner    The patient has been mindful of what has been eating and is following a diabetes diet    I reviewed current medications and the patient has no issues with diabetes medications  Jad Hernández is up to date with eye exam and denied any history of diabetic retinopathy   Last eye exam was 2023 , no h/o diabetic retinopathy  The patient performs own feet care  Microvascular complications:  No Retinopathy, stage 3 CKD  or Neuropathy   Macrovascular complications: no CAD, PVD, or Stroke  PAST MEDICAL HISTORY   Past Medical History:   Diagnosis Date    Bilateral nonexudative age-related macular degeneration 5/9/2019    HLD

## 2023-09-28 ENCOUNTER — TELEPHONE (OUTPATIENT)
Dept: ENDOCRINOLOGY | Age: 78
End: 2023-09-28

## 2023-09-28 NOTE — TELEPHONE ENCOUNTER
Pt lm on clinical line - was in 9/25 for appt, Olaf Davidson was placed to monitor bs. Pt reports Olaf Davidson is not longer working. What do you want her to do? * Currently no samples of Gal[place] in office.

## 2023-12-08 ENCOUNTER — TELEPHONE (OUTPATIENT)
Dept: PRIMARY CARE CLINIC | Age: 78
End: 2023-12-08

## 2023-12-08 DIAGNOSIS — E78.2 MIXED HYPERLIPIDEMIA: ICD-10-CM

## 2023-12-08 DIAGNOSIS — Z79.899 LONG TERM CURRENT USE OF THERAPEUTIC DRUG: ICD-10-CM

## 2023-12-08 DIAGNOSIS — E55.9 VITAMIN D DEFICIENCY: ICD-10-CM

## 2023-12-08 DIAGNOSIS — Z79.4 TYPE 2 DIABETES MELLITUS WITH STAGE 3A CHRONIC KIDNEY DISEASE, WITH LONG-TERM CURRENT USE OF INSULIN (HCC): ICD-10-CM

## 2023-12-08 DIAGNOSIS — N18.31 TYPE 2 DIABETES MELLITUS WITH STAGE 3A CHRONIC KIDNEY DISEASE, WITH LONG-TERM CURRENT USE OF INSULIN (HCC): ICD-10-CM

## 2023-12-08 DIAGNOSIS — E11.22 TYPE 2 DIABETES MELLITUS WITH STAGE 3A CHRONIC KIDNEY DISEASE, WITH LONG-TERM CURRENT USE OF INSULIN (HCC): ICD-10-CM

## 2023-12-08 DIAGNOSIS — R53.83 OTHER FATIGUE: ICD-10-CM

## 2023-12-08 LAB
ABSOLUTE IMMATURE GRANULOCYTE: <0.03 K/UL (ref 0–0.58)
ALBUMIN SERPL-MCNC: 4 G/DL (ref 3.5–5.2)
ALP BLD-CCNC: 190 U/L (ref 35–104)
ALT SERPL-CCNC: 15 U/L (ref 0–32)
ANION GAP SERPL CALCULATED.3IONS-SCNC: 15 MMOL/L (ref 7–16)
AST SERPL-CCNC: 22 U/L (ref 0–31)
BASOPHILS ABSOLUTE: 0.04 K/UL (ref 0–0.2)
BASOPHILS RELATIVE PERCENT: 1 % (ref 0–2)
BILIRUB SERPL-MCNC: 0.3 MG/DL (ref 0–1.2)
BILIRUBIN URINE: NEGATIVE
BUN BLDV-MCNC: 22 MG/DL (ref 6–23)
CALCIUM SERPL-MCNC: 9.6 MG/DL (ref 8.6–10.2)
CHLORIDE BLD-SCNC: 102 MMOL/L (ref 98–107)
CHOLESTEROL, FASTING: 160 MG/DL
CO2: 26 MMOL/L (ref 22–29)
COLOR: YELLOW
COMMENT: NORMAL
CREAT SERPL-MCNC: 1 MG/DL (ref 0.5–1)
CREATININE URINE: 97.8 MG/DL (ref 29–226)
EOSINOPHILS ABSOLUTE: 0.21 K/UL (ref 0.05–0.5)
EOSINOPHILS RELATIVE PERCENT: 3 % (ref 0–6)
FOLATE: >20 NG/ML (ref 4.8–24.2)
GFR SERPL CREATININE-BSD FRML MDRD: >60 ML/MIN/1.73M2
GLUCOSE BLD-MCNC: 187 MG/DL (ref 74–99)
GLUCOSE URINE: NEGATIVE MG/DL
HBA1C MFR BLD: 9 % (ref 4–5.6)
HCT VFR BLD CALC: 39.9 % (ref 34–48)
HDLC SERPL-MCNC: 52 MG/DL
HEMOGLOBIN: 12.7 G/DL (ref 11.5–15.5)
IMMATURE GRANULOCYTES: 0 % (ref 0–5)
KETONES, URINE: NEGATIVE MG/DL
LDL CHOLESTEROL: 89 MG/DL
LEUKOCYTE ESTERASE, URINE: NEGATIVE
LYMPHOCYTES ABSOLUTE: 1.28 K/UL (ref 1.5–4)
LYMPHOCYTES RELATIVE PERCENT: 17 % (ref 20–42)
MAGNESIUM: 1.5 MG/DL (ref 1.6–2.6)
MCH RBC QN AUTO: 28.5 PG (ref 26–35)
MCHC RBC AUTO-ENTMCNC: 31.8 G/DL (ref 32–34.5)
MCV RBC AUTO: 89.7 FL (ref 80–99.9)
MICROALBUMIN/CREAT 24H UR: 18 MG/L (ref 0–19)
MICROALBUMIN/CREAT UR-RTO: 18 MCG/MG CREAT (ref 0–30)
MONOCYTES ABSOLUTE: 0.32 K/UL (ref 0.1–0.95)
MONOCYTES RELATIVE PERCENT: 4 % (ref 2–12)
NEUTROPHILS ABSOLUTE: 5.6 K/UL (ref 1.8–7.3)
NEUTROPHILS RELATIVE PERCENT: 75 % (ref 43–80)
NITRITE, URINE: NEGATIVE
PDW BLD-RTO: 14 % (ref 11.5–15)
PH UA: 6 (ref 5–9)
PLATELET # BLD: 186 K/UL (ref 130–450)
PMV BLD AUTO: 11 FL (ref 7–12)
POTASSIUM SERPL-SCNC: 4.1 MMOL/L (ref 3.5–5)
PROTEIN UA: NEGATIVE MG/DL
RBC # BLD: 4.45 M/UL (ref 3.5–5.5)
SODIUM BLD-SCNC: 143 MMOL/L (ref 132–146)
SPECIFIC GRAVITY UA: 1.02 (ref 1–1.03)
TOTAL PROTEIN: 7.2 G/DL (ref 6.4–8.3)
TRIGLYCERIDE, FASTING: 96 MG/DL
TSH SERPL DL<=0.05 MIU/L-ACNC: 3.11 UIU/ML (ref 0.27–4.2)
TURBIDITY: CLEAR
URINE HGB: NEGATIVE
UROBILINOGEN, URINE: 0.2 EU/DL (ref 0–1)
VITAMIN B-12: 842 PG/ML (ref 211–946)
VITAMIN D 25-HYDROXY: 47.4 NG/ML (ref 30–100)
VLDLC SERPL CALC-MCNC: 19 MG/DL
WBC # BLD: 7.5 K/UL (ref 4.5–11.5)

## 2023-12-08 NOTE — TELEPHONE ENCOUNTER
Please let the patient know that blood work results showed    Magnesium level was normal and similar when compared to previous. This could be related to medication and would recommend magnesium supplement 400 mg twice a day    Kidney function values were improved when compared to previous    Alkaline phosphatase level was borderline increase of uncertain cause.   This is an enzyme found in the bone liver pancreas    Other electrolytes including potassium and other liver functions were normal    Hemoglobin A1c is a measure of 3-month sugar control was still uncontrolled at 9.0    Blood counts were normal with nonspecific decrease in the amount of hemoglobin per cells as well as decrease in the overall lymphocyte count    Vitamin G95 and folic acid levels were normal    Vitamin D levels were normal    Urine analysis was normal with no evidence of microscopic protein    Cholesterol levels were fair borderline    Thyroid levels were normal    Thanks

## 2023-12-15 ENCOUNTER — OFFICE VISIT (OUTPATIENT)
Dept: PRIMARY CARE CLINIC | Age: 78
End: 2023-12-15

## 2023-12-15 VITALS
SYSTOLIC BLOOD PRESSURE: 158 MMHG | OXYGEN SATURATION: 99 % | WEIGHT: 205 LBS | DIASTOLIC BLOOD PRESSURE: 80 MMHG | HEART RATE: 77 BPM | BODY MASS INDEX: 35 KG/M2 | TEMPERATURE: 97.2 F | HEIGHT: 64 IN

## 2023-12-15 DIAGNOSIS — Z79.4 TYPE 2 DIABETES MELLITUS WITH STAGE 3A CHRONIC KIDNEY DISEASE, WITH LONG-TERM CURRENT USE OF INSULIN (HCC): ICD-10-CM

## 2023-12-15 DIAGNOSIS — E78.2 MIXED HYPERLIPIDEMIA: ICD-10-CM

## 2023-12-15 DIAGNOSIS — R60.0 LOCALIZED EDEMA: ICD-10-CM

## 2023-12-15 DIAGNOSIS — E11.22 TYPE 2 DIABETES MELLITUS WITH STAGE 3A CHRONIC KIDNEY DISEASE, WITH LONG-TERM CURRENT USE OF INSULIN (HCC): ICD-10-CM

## 2023-12-15 DIAGNOSIS — R29.898 WEAKNESS OF BOTH LOWER LIMBS: Primary | ICD-10-CM

## 2023-12-15 DIAGNOSIS — N18.31 STAGE 3A CHRONIC KIDNEY DISEASE (HCC): ICD-10-CM

## 2023-12-15 DIAGNOSIS — I10 BENIGN ESSENTIAL HYPERTENSION: ICD-10-CM

## 2023-12-15 DIAGNOSIS — N18.31 TYPE 2 DIABETES MELLITUS WITH STAGE 3A CHRONIC KIDNEY DISEASE, WITH LONG-TERM CURRENT USE OF INSULIN (HCC): ICD-10-CM

## 2023-12-15 DIAGNOSIS — E55.9 VITAMIN D DEFICIENCY: ICD-10-CM

## 2023-12-15 RX ORDER — HYDRALAZINE HYDROCHLORIDE 25 MG/1
25 TABLET, FILM COATED ORAL 3 TIMES DAILY
Qty: 270 TABLET | Refills: 1 | Status: SHIPPED | OUTPATIENT
Start: 2023-12-15

## 2023-12-15 RX ORDER — ATORVASTATIN CALCIUM 40 MG/1
40 TABLET, FILM COATED ORAL EVERY 24 HOURS
Qty: 90 TABLET | Refills: 1 | Status: SHIPPED | OUTPATIENT
Start: 2023-12-15

## 2023-12-15 NOTE — PROGRESS NOTES
heart sounds. No murmur heard. Pulmonary:      Effort: Pulmonary effort is normal.      Breath sounds: Normal breath sounds. No wheezing. Abdominal:      General: Bowel sounds are normal. There is no distension. Palpations: Abdomen is soft. Tenderness: There is no abdominal tenderness. There is no guarding or rebound. Musculoskeletal:         General: Normal range of motion. Cervical back: Normal range of motion and neck supple. Right lower leg: Edema present. Left lower leg: Edema present. Comments: Tenderness to thigh b/l   weakness to resistance of LE b/l    Lymphadenopathy:      Cervical: No cervical adenopathy. Skin:     General: Skin is warm and dry. Neurological:      Mental Status: She is alert and oriented to person, place, and time. Cranial Nerves: No cranial nerve deficit.    Psychiatric:         Judgment: Judgment normal.         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  - had MRI lumbar spine (5/2022)   - had EMG   - [place] vascular borderline on the right (6/2022  - self stopped gabapentin (5/2023) - due to swelling   - had home PT   - in outpatient PT   - stable 12/15/2023    Type 2 diabetes mellitus with stage 3a chronic kidney disease, with long-term current use of insulin ([facility])  - following with endocrinology   - monitor sugar at home  - now on 70/30 insulin 24 units in am and 15 units in pm  - on metformin   - monitor for hypoglycemia and action plan  - non compliant with diet  - following with endocrinology   - discussed to ask endocrinology about invokana for DM kidney disease risk reduction and cardiovascular risk reduction   - Last A1c was 9.0 (12/2023)    follows with optho (7/23) -wet age related macular degeneration left dry age macular degeneration right, eylea left   on statin  on asa  On ACE

## 2024-01-08 ENCOUNTER — OFFICE VISIT (OUTPATIENT)
Dept: ENDOCRINOLOGY | Age: 79
End: 2024-01-08
Payer: MEDICARE

## 2024-01-08 VITALS
HEIGHT: 64 IN | RESPIRATION RATE: 16 BRPM | SYSTOLIC BLOOD PRESSURE: 162 MMHG | BODY MASS INDEX: 35.34 KG/M2 | WEIGHT: 207 LBS | HEART RATE: 83 BPM | OXYGEN SATURATION: 99 % | DIASTOLIC BLOOD PRESSURE: 81 MMHG

## 2024-01-08 DIAGNOSIS — I10 ESSENTIAL HYPERTENSION: ICD-10-CM

## 2024-01-08 DIAGNOSIS — E78.2 MIXED HYPERLIPIDEMIA: ICD-10-CM

## 2024-01-08 DIAGNOSIS — E55.9 VITAMIN D DEFICIENCY: ICD-10-CM

## 2024-01-08 DIAGNOSIS — N18.2 TYPE 2 DIABETES MELLITUS WITH STAGE 2 CHRONIC KIDNEY DISEASE, WITH LONG-TERM CURRENT USE OF INSULIN (HCC): ICD-10-CM

## 2024-01-08 DIAGNOSIS — E11.65 UNCONTROLLED TYPE 2 DIABETES MELLITUS WITH HYPERGLYCEMIA (HCC): Primary | ICD-10-CM

## 2024-01-08 DIAGNOSIS — E11.9 TYPE 2 DIABETES MELLITUS WITHOUT COMPLICATION, WITH LONG-TERM CURRENT USE OF INSULIN (HCC): ICD-10-CM

## 2024-01-08 DIAGNOSIS — E11.22 TYPE 2 DIABETES MELLITUS WITH STAGE 2 CHRONIC KIDNEY DISEASE, WITH LONG-TERM CURRENT USE OF INSULIN (HCC): ICD-10-CM

## 2024-01-08 DIAGNOSIS — Z79.4 TYPE 2 DIABETES MELLITUS WITH STAGE 2 CHRONIC KIDNEY DISEASE, WITH LONG-TERM CURRENT USE OF INSULIN (HCC): ICD-10-CM

## 2024-01-08 DIAGNOSIS — Z79.4 TYPE 2 DIABETES MELLITUS WITHOUT COMPLICATION, WITH LONG-TERM CURRENT USE OF INSULIN (HCC): ICD-10-CM

## 2024-01-08 PROCEDURE — 3079F DIAST BP 80-89 MM HG: CPT | Performed by: CLINICAL NURSE SPECIALIST

## 2024-01-08 PROCEDURE — 1123F ACP DISCUSS/DSCN MKR DOCD: CPT | Performed by: CLINICAL NURSE SPECIALIST

## 2024-01-08 PROCEDURE — 99214 OFFICE O/P EST MOD 30 MIN: CPT | Performed by: CLINICAL NURSE SPECIALIST

## 2024-01-08 PROCEDURE — 3077F SYST BP >= 140 MM HG: CPT | Performed by: CLINICAL NURSE SPECIALIST

## 2024-01-08 RX ORDER — SYRINGE-NEEDLE,INSULIN,0.5 ML 27GX1/2"
SYRINGE, EMPTY DISPOSABLE MISCELLANEOUS
Qty: 200 EACH | Refills: 3 | Status: SHIPPED | OUTPATIENT
Start: 2024-01-08

## 2024-01-08 NOTE — PROGRESS NOTES
RECOMMENDATIONS   Any Falcon, a 78 y.o.-old female seen in for the following issues       Assessment:      Diagnosis Orders   1. Uncontrolled type 2 diabetes mellitus with hyperglycemia (HCC)        2. Mixed hyperlipidemia        3. Vitamin D deficiency        4. Essential hypertension        5. Type 2 diabetes mellitus with stage 2 chronic kidney disease, with long-term current use of insulin (HCC)  insulin 70-30 (NOVOLIN 70/30) (70-30) 100 UNIT per ML injection vial      6. Type 2 diabetes mellitus without complication, with long-term current use of insulin (HCC)  Insulin Syringe-Needle U-100 (BD INSULIN SYRINGE U/F) 31G X 5/16\" 0.5 ML MISC    metFORMIN (GLUCOPHAGE) 500 MG tablet                Plan:     1. Uncontrolled type 2 diabetes mellitus with hyperglycemia (HCC)   Diabetes usually well controlled per recent BG log   Hba1c 9.0% however this is not matching blood sugar readings  Previous ric placed was reviewed and blood sugars mostly stable.  No recent hypoglycemia   Plan: Continue Novolin 70/30 to 24 units with breakfast.  Continue 15 units at dinner  Continue metformin 500 mg 1 tablet twice daily  Check blood sugars twice daily  Will start ric 2  Further recommendations will be made at that time  Following with neurology for diabetic amyotrophy   2. Mixed hyperlipidemia   Continue atorvastatin 40 mg daily   Counseled on the importance of statin therapy with diabetes  Last lipid stable      3. Vitamin D deficiency   Continue vitamin D supplementation.    Last vitamin D within normal limits   4. Essential hypertension   BP goal less than 140/90.    Advise follow-up with PCP         I personally spent  30 minutes reviewing  external notes from PCP and other patient's care team providers, and personally interpreted labs associated with the above diagnosis.    Return in about 3 months (around 4/8/2024).    The above issues were reviewed with the patient who understood and agreed with the plan.    Thank

## 2024-01-18 DIAGNOSIS — I10 BENIGN ESSENTIAL HYPERTENSION: ICD-10-CM

## 2024-01-18 RX ORDER — HYDRALAZINE HYDROCHLORIDE 25 MG/1
25 TABLET, FILM COATED ORAL DAILY
Qty: 90 TABLET | Refills: 1 | Status: SHIPPED | OUTPATIENT
Start: 2024-01-18

## 2024-01-18 NOTE — TELEPHONE ENCOUNTER
Pt is calling to get a refill on this medication, she says she didn't get the one that was printed out 12/15, changed the way she was prescribed to take it because the pt states she only takes it once a day     Last Appointment:  12/15/2023    Future appts:  Future Appointments   Date Time Provider Department Center   3/15/2024  9:30 AM Brendon Villalba MD [facility]   5/6/2024 10:00 AM Don Rhoades APRN - [facility]

## 2024-02-02 DIAGNOSIS — E78.2 MIXED HYPERLIPIDEMIA: ICD-10-CM

## 2024-02-02 RX ORDER — ATORVASTATIN CALCIUM 40 MG/1
40 TABLET, FILM COATED ORAL EVERY 24 HOURS
Qty: 90 TABLET | Refills: 1 | Status: SHIPPED | OUTPATIENT
Start: 2024-02-02

## 2024-02-20 DIAGNOSIS — R60.0 LOCALIZED EDEMA: ICD-10-CM

## 2024-02-20 RX ORDER — FUROSEMIDE 20 MG/1
20 TABLET ORAL DAILY
Qty: 90 TABLET | Refills: 1 | Status: SHIPPED | OUTPATIENT
Start: 2024-02-20

## 2024-02-26 DIAGNOSIS — I10 BENIGN ESSENTIAL HYPERTENSION: ICD-10-CM

## 2024-02-27 RX ORDER — LISINOPRIL AND HYDROCHLOROTHIAZIDE 20; 12.5 MG/1; MG/1
1 TABLET ORAL 2 TIMES DAILY
Qty: 180 TABLET | Refills: 1 | Status: SHIPPED | OUTPATIENT
Start: 2024-02-27

## 2024-03-15 ENCOUNTER — OFFICE VISIT (OUTPATIENT)
Dept: PRIMARY CARE CLINIC | Age: 79
End: 2024-03-15

## 2024-03-15 VITALS
OXYGEN SATURATION: 100 % | TEMPERATURE: 97.4 F | DIASTOLIC BLOOD PRESSURE: 78 MMHG | HEIGHT: 64 IN | SYSTOLIC BLOOD PRESSURE: 138 MMHG | WEIGHT: 204 LBS | HEART RATE: 56 BPM | BODY MASS INDEX: 34.83 KG/M2

## 2024-03-15 DIAGNOSIS — R60.0 LOCALIZED EDEMA: ICD-10-CM

## 2024-03-15 DIAGNOSIS — N18.31 TYPE 2 DIABETES MELLITUS WITH STAGE 3A CHRONIC KIDNEY DISEASE, WITH LONG-TERM CURRENT USE OF INSULIN (HCC): ICD-10-CM

## 2024-03-15 DIAGNOSIS — E78.2 MIXED HYPERLIPIDEMIA: ICD-10-CM

## 2024-03-15 DIAGNOSIS — I10 BENIGN ESSENTIAL HYPERTENSION: Primary | ICD-10-CM

## 2024-03-15 DIAGNOSIS — R29.898 WEAKNESS OF BOTH LOWER LIMBS: ICD-10-CM

## 2024-03-15 DIAGNOSIS — R53.83 OTHER FATIGUE: ICD-10-CM

## 2024-03-15 DIAGNOSIS — E11.22 TYPE 2 DIABETES MELLITUS WITH STAGE 3A CHRONIC KIDNEY DISEASE, WITH LONG-TERM CURRENT USE OF INSULIN (HCC): ICD-10-CM

## 2024-03-15 DIAGNOSIS — Z79.899 LONG TERM CURRENT USE OF THERAPEUTIC DRUG: ICD-10-CM

## 2024-03-15 DIAGNOSIS — E55.9 VITAMIN D DEFICIENCY: ICD-10-CM

## 2024-03-15 DIAGNOSIS — Z79.4 TYPE 2 DIABETES MELLITUS WITH STAGE 3A CHRONIC KIDNEY DISEASE, WITH LONG-TERM CURRENT USE OF INSULIN (HCC): ICD-10-CM

## 2024-03-15 DIAGNOSIS — N18.31 STAGE 3A CHRONIC KIDNEY DISEASE (HCC): ICD-10-CM

## 2024-03-15 RX ORDER — METOPROLOL SUCCINATE 50 MG/1
50 TABLET, EXTENDED RELEASE ORAL DAILY
Qty: 90 TABLET | Refills: 1 | Status: SHIPPED | OUTPATIENT
Start: 2024-03-15

## 2024-03-15 ASSESSMENT — ENCOUNTER SYMPTOMS
SORE THROAT: 0
CONSTIPATION: 0
VOMITING: 0
EYE PAIN: 0
SHORTNESS OF BREATH: 0
NAUSEA: 0
RHINORRHEA: 0
DIARRHEA: 1
ABDOMINAL PAIN: 0
COUGH: 0
CHEST TIGHTNESS: 0
BLOOD IN STOOL: 0

## 2024-03-15 ASSESSMENT — PATIENT HEALTH QUESTIONNAIRE - PHQ9
SUM OF ALL RESPONSES TO PHQ QUESTIONS 1-9: 1
2. FEELING DOWN, DEPRESSED OR HOPELESS: 1
SUM OF ALL RESPONSES TO PHQ QUESTIONS 1-9: 1
1. LITTLE INTEREST OR PLEASURE IN DOING THINGS: 0
SUM OF ALL RESPONSES TO PHQ9 QUESTIONS 1 & 2: 1

## 2024-03-15 NOTE — PROGRESS NOTES
[facility]   Internal Medicine     3/15/2024  Any Falcon : 1945 Sex: female  Age:78 y.o.    Chief Complaint   Patient presents with    Extremity Weakness     Completed PT.  Condition improving per patient, ambulating up and down driveway.         HPI:   Patient presents to office for evaluation of the following medical concerns.    -  has been having leg weakness b/l and aches in b/l legs since (3/2022) , now left more than right. States aches are worse at night. States having issues with getting up, no strength in legs. States some numbness.  has been taking ibuprofen multiple time a day. 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 Has seen neurology at [facility] feel diabetic amyotrophy, had EMG (2022). Started on gabapentin on 300mg once a day due to side effects. 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 Some improved left leg weakness.  self stopped gabapentin due to swelling (2023).  has completed PT (3/2024). States walking with walker, better with steps.     -  has LE edema.  has been taking lasix. States wearing compression stockings. Self stopped gabapentin. On lasix 10mg most days. Still present but stable 3/15/2024    -  has been diagnosed with macular degeneration.  has seen retinal specialist and receiving injections. Possible Unsure if Retinopathy. 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 Following with endocrinology for Diabetes.  monitors sugars at home. Last visit with endocrinology

## 2024-04-15 ENCOUNTER — TELEPHONE (OUTPATIENT)
Dept: ENDOCRINOLOGY | Age: 79
End: 2024-04-15

## 2024-04-15 DIAGNOSIS — E11.9 TYPE 2 DIABETES MELLITUS WITHOUT COMPLICATION, WITH LONG-TERM CURRENT USE OF INSULIN (HCC): ICD-10-CM

## 2024-04-15 DIAGNOSIS — Z79.4 TYPE 2 DIABETES MELLITUS WITHOUT COMPLICATION, WITH LONG-TERM CURRENT USE OF INSULIN (HCC): ICD-10-CM

## 2024-04-15 RX ORDER — BLOOD SUGAR DIAGNOSTIC
STRIP MISCELLANEOUS
Qty: 200 EACH | Refills: 5 | Status: SHIPPED | OUTPATIENT
Start: 2024-04-15

## 2024-05-01 ENCOUNTER — HOSPITAL ENCOUNTER (OUTPATIENT)
Dept: CARDIOLOGY | Age: 79
Discharge: HOME | End: 2024-05-03
Attending: INTERNAL MEDICINE
Payer: MEDICARE

## 2024-05-01 VITALS
WEIGHT: 204 LBS | DIASTOLIC BLOOD PRESSURE: 78 MMHG | HEIGHT: 64 IN | SYSTOLIC BLOOD PRESSURE: 138 MMHG | BODY MASS INDEX: 34.83 KG/M2

## 2024-05-01 DIAGNOSIS — R60.0 LOCALIZED EDEMA: ICD-10-CM

## 2024-05-01 PROCEDURE — 93306 TTE W/DOPPLER COMPLETE: CPT

## 2024-05-03 LAB
ECHO AO ASC DIAM: 2.9 CM
ECHO AO ASCENDING AORTA INDEX: 1.47 CM/M2
ECHO AV AREA PEAK VELOCITY: 2.3 CM2
ECHO AV AREA VTI: 2.6 CM2
ECHO AV AREA/BSA PEAK VELOCITY: 1.2 CM2/M2
ECHO AV AREA/BSA VTI: 1.3 CM2/M2
ECHO AV CUSP MM: 1.9 CM
ECHO AV MEAN GRADIENT: 6 MMHG
ECHO AV MEAN VELOCITY: 1.2 M/S
ECHO AV PEAK GRADIENT: 10 MMHG
ECHO AV PEAK VELOCITY: 1.6 M/S
ECHO AV VELOCITY RATIO: 0.75
ECHO AV VTI: 34.7 CM
ECHO BSA: 2.04 M2
ECHO EST RA PRESSURE: 3 MMHG
ECHO LA DIAMETER INDEX: 1.98 CM/M2
ECHO LA DIAMETER: 3.9 CM
ECHO LA VOL A-L A2C: 50 ML (ref 22–52)
ECHO LA VOL A-L A4C: 48 ML (ref 22–52)
ECHO LA VOL MOD A2C: 49 ML (ref 22–52)
ECHO LA VOL MOD A4C: 45 ML (ref 22–52)
ECHO LA VOLUME AREA LENGTH: 49 ML
ECHO LA VOLUME INDEX A-L A2C: 25 ML/M2 (ref 16–34)
ECHO LA VOLUME INDEX A-L A4C: 24 ML/M2 (ref 16–34)
ECHO LA VOLUME INDEX AREA LENGTH: 25 ML/M2 (ref 16–34)
ECHO LA VOLUME INDEX MOD A2C: 25 ML/M2 (ref 16–34)
ECHO LA VOLUME INDEX MOD A4C: 23 ML/M2 (ref 16–34)
ECHO LV EF PHYSICIAN: 65 %
ECHO LV FRACTIONAL SHORTENING: 40 % (ref 28–44)
ECHO LV INTERNAL DIMENSION DIASTOLE INDEX: 2.03 CM/M2
ECHO LV INTERNAL DIMENSION DIASTOLIC: 4 CM (ref 3.9–5.3)
ECHO LV INTERNAL DIMENSION SYSTOLIC INDEX: 1.22 CM/M2
ECHO LV INTERNAL DIMENSION SYSTOLIC: 2.4 CM
ECHO LV ISOVOLUMETRIC RELAXATION TIME (IVRT): 115.3 MS
ECHO LV IVSD: 1.2 CM (ref 0.6–0.9)
ECHO LV IVSS: 1.4 CM
ECHO LV MASS 2D: 175.8 G (ref 67–162)
ECHO LV MASS INDEX 2D: 89.3 G/M2 (ref 43–95)
ECHO LV POSTERIOR WALL DIASTOLIC: 1.3 CM (ref 0.6–0.9)
ECHO LV POSTERIOR WALL SYSTOLIC: 1.7 CM
ECHO LV RELATIVE WALL THICKNESS RATIO: 0.65
ECHO LVOT AREA: 3.1 CM2
ECHO LVOT AV VTI INDEX: 0.84
ECHO LVOT DIAM: 2 CM
ECHO LVOT MEAN GRADIENT: 3 MMHG
ECHO LVOT PEAK GRADIENT: 6 MMHG
ECHO LVOT PEAK VELOCITY: 1.2 M/S
ECHO LVOT STROKE VOLUME INDEX: 46.5 ML/M2
ECHO LVOT SV: 91.7 ML
ECHO LVOT VTI: 29.2 CM
ECHO MV "A" WAVE DURATION: 170.7 MSEC
ECHO MV A VELOCITY: 1.05 M/S
ECHO MV AREA PHT: 3.1 CM2
ECHO MV AREA VTI: 3.3 CM2
ECHO MV E DECELERATION TIME (DT): 282.9 MS
ECHO MV E VELOCITY: 0.75 M/S
ECHO MV E/A RATIO: 0.71
ECHO MV LVOT VTI INDEX: 0.95
ECHO MV MAX VELOCITY: 1 M/S
ECHO MV MEAN GRADIENT: 2 MMHG
ECHO MV MEAN VELOCITY: 0.7 M/S
ECHO MV PEAK GRADIENT: 4 MMHG
ECHO MV PRESSURE HALF TIME (PHT): 70.5 MS
ECHO MV VTI: 27.8 CM
ECHO PV MAX VELOCITY: 1 M/S
ECHO PV MEAN GRADIENT: 2 MMHG
ECHO PV MEAN VELOCITY: 0.7 M/S
ECHO PV PEAK GRADIENT: 4 MMHG
ECHO PV VTI: 22.9 CM
ECHO PVEIN A DURATION: 143 MS
ECHO PVEIN A VELOCITY: 0.3 M/S
ECHO PVEIN PEAK D VELOCITY: 0.5 M/S
ECHO PVEIN PEAK S VELOCITY: 0.9 M/S
ECHO PVEIN S/D RATIO: 1.8
ECHO RIGHT VENTRICULAR SYSTOLIC PRESSURE (RVSP): 32 MMHG
ECHO RV INTERNAL DIMENSION: 3.1 CM
ECHO RV TAPSE: 2.2 CM (ref 1.7–?)
ECHO TV REGURGITANT MAX VELOCITY: 2.67 M/S
ECHO TV REGURGITANT PEAK GRADIENT: 29 MMHG

## 2024-05-04 ENCOUNTER — TELEPHONE (OUTPATIENT)
Dept: FAMILY MEDICINE CLINIC | Age: 79
End: 2024-05-04

## 2024-05-04 NOTE — TELEPHONE ENCOUNTER
Please let the patient know that echocardiogram per cardiology report suggested    Left ventricle was pumping normally with an ejection fraction that was considered normal at 65%.  There was increased in the muscle size and slight impaired relaxation of the heart    Valvular analysis showed mild leakiness of the mitral valve and trace leakiness of the tricuspid valve but other valves were normal    Right ventricle size and function appeared normal    Thanks

## 2024-05-06 ENCOUNTER — OFFICE VISIT (OUTPATIENT)
Dept: ENDOCRINOLOGY | Age: 79
End: 2024-05-06
Payer: MEDICARE

## 2024-05-06 VITALS
WEIGHT: 205 LBS | OXYGEN SATURATION: 94 % | HEIGHT: 65 IN | HEART RATE: 89 BPM | DIASTOLIC BLOOD PRESSURE: 94 MMHG | SYSTOLIC BLOOD PRESSURE: 178 MMHG | BODY MASS INDEX: 34.16 KG/M2

## 2024-05-06 DIAGNOSIS — E78.2 MIXED HYPERLIPIDEMIA: ICD-10-CM

## 2024-05-06 DIAGNOSIS — E11.22 TYPE 2 DIABETES MELLITUS WITH STAGE 2 CHRONIC KIDNEY DISEASE, WITH LONG-TERM CURRENT USE OF INSULIN (HCC): ICD-10-CM

## 2024-05-06 DIAGNOSIS — E11.65 UNCONTROLLED TYPE 2 DIABETES MELLITUS WITH HYPERGLYCEMIA (HCC): Primary | ICD-10-CM

## 2024-05-06 DIAGNOSIS — E11.9 TYPE 2 DIABETES MELLITUS WITHOUT COMPLICATION, WITH LONG-TERM CURRENT USE OF INSULIN (HCC): ICD-10-CM

## 2024-05-06 DIAGNOSIS — Z79.4 TYPE 2 DIABETES MELLITUS WITH STAGE 2 CHRONIC KIDNEY DISEASE, WITH LONG-TERM CURRENT USE OF INSULIN (HCC): ICD-10-CM

## 2024-05-06 DIAGNOSIS — E55.9 VITAMIN D DEFICIENCY: ICD-10-CM

## 2024-05-06 DIAGNOSIS — Z79.4 TYPE 2 DIABETES MELLITUS WITHOUT COMPLICATION, WITH LONG-TERM CURRENT USE OF INSULIN (HCC): ICD-10-CM

## 2024-05-06 DIAGNOSIS — N18.2 TYPE 2 DIABETES MELLITUS WITH STAGE 2 CHRONIC KIDNEY DISEASE, WITH LONG-TERM CURRENT USE OF INSULIN (HCC): ICD-10-CM

## 2024-05-06 DIAGNOSIS — I10 ESSENTIAL HYPERTENSION: ICD-10-CM

## 2024-05-06 LAB — HBA1C MFR BLD: 8.1 %

## 2024-05-06 PROCEDURE — 83036 HEMOGLOBIN GLYCOSYLATED A1C: CPT | Performed by: CLINICAL NURSE SPECIALIST

## 2024-05-06 PROCEDURE — 3077F SYST BP >= 140 MM HG: CPT | Performed by: CLINICAL NURSE SPECIALIST

## 2024-05-06 PROCEDURE — 1123F ACP DISCUSS/DSCN MKR DOCD: CPT | Performed by: CLINICAL NURSE SPECIALIST

## 2024-05-06 PROCEDURE — 3080F DIAST BP >= 90 MM HG: CPT | Performed by: CLINICAL NURSE SPECIALIST

## 2024-05-06 PROCEDURE — 3052F HG A1C>EQUAL 8.0%<EQUAL 9.0%: CPT | Performed by: CLINICAL NURSE SPECIALIST

## 2024-05-06 PROCEDURE — 99214 OFFICE O/P EST MOD 30 MIN: CPT | Performed by: CLINICAL NURSE SPECIALIST

## 2024-05-06 PROCEDURE — 95251 CONT GLUC MNTR ANALYSIS I&R: CPT | Performed by: CLINICAL NURSE SPECIALIST

## 2024-05-06 RX ORDER — SYRINGE-NEEDLE,INSULIN,0.5 ML 27GX1/2"
SYRINGE, EMPTY DISPOSABLE MISCELLANEOUS
Qty: 200 EACH | Refills: 3 | Status: SHIPPED | OUTPATIENT
Start: 2024-05-06

## 2024-05-06 NOTE — PROGRESS NOTES
[facility]  [facility] Department of Endocrinology Diabetes and Metabolism   [address], [place], [place], [address]  Phone: [phone]  Fax: [phone]    Date of Service: 5/6/2024    Primary Care Physician: Brendon Villalba MD  Referring physician: No ref. provider found  Provider: MARKEL Jasmine - CNS     Reason for the visit:  Type 2 DM       History of Present Illness:  The history is provided by the patient. No  was used. Accuracy of the patient data is excellent.  Any Falcon is a very pleasant 78 y.o. female seen today for diabetes management     Any Falcon was diagnosed with diabetes in 1995 and currently on Metformin 500 mg BID, Novolin 70/30 24 units in AM and 15 units in PM,    She holds her 70/30  If BS is < 100.           The patient has been checking blood sugar 4 times per day   Uses ric   Ambulatory continuous glucose monitoring of interstitial tissue fluid via a subcutaneous sensor for a minimum of 72 hours; analysis, interpretation and report  Average sensor glucose 167  Time in range 68%  Hyperglycemia 32%  Hypoglycemia 0%    Recently diagnosed with diabetic amyotrophy  Following with [facility] neurology       Most recent A1c results summarized below  Lab Results   Component Value Date/Time    LABA1C 8.1 05/06/2024 09:57 AM    LABA1C 9.0 12/08/2023 09:14 AM    LABA1C 9.3 07/15/2023 08:44 AM       Patient has had infrequent  hypoglycemic episodes     The patient has been mindful of what has been eating and is following a diabetes diet    I reviewed current medications and the patient has no issues with diabetes medications  Any Falcon is up to date with eye exam and denied any history of diabetic retinopathy   Last eye exam was 2024 , no h/o diabetic retinopathy  The patient performs own feet care  Microvascular complications:  No Retinopathy, stage 3 CKD  or Neuropathy   Macrovascular complications: no CAD, PVD, or Stroke  PAST

## 2024-05-07 NOTE — TELEPHONE ENCOUNTER
Would recommend repeating echo at sometime in the near future a year to 2 years and would consider cardiology evaluation at some point as well

## 2024-05-09 ENCOUNTER — TELEPHONE (OUTPATIENT)
Dept: ENDOCRINOLOGY | Age: 79
End: 2024-05-09

## 2024-05-09 RX ORDER — INSULIN LISPRO 100 [IU]/ML
INJECTION, SUSPENSION SUBCUTANEOUS
COMMUNITY
End: 2024-05-09 | Stop reason: SDUPTHER

## 2024-05-09 RX ORDER — PEN NEEDLE, DIABETIC 30 GX3/16"
NEEDLE, DISPOSABLE MISCELLANEOUS
COMMUNITY
End: 2024-05-09 | Stop reason: SDUPTHER

## 2024-05-09 RX ORDER — INSULIN LISPRO 100 [IU]/ML
INJECTION, SUSPENSION SUBCUTANEOUS
Qty: 15 ADJUSTABLE DOSE PRE-FILLED PEN SYRINGE | Refills: 3 | Status: SHIPPED | OUTPATIENT
Start: 2024-05-09

## 2024-05-09 RX ORDER — PEN NEEDLE, DIABETIC 30 GX3/16"
NEEDLE, DISPOSABLE MISCELLANEOUS
Qty: 100 EACH | Refills: 3 | Status: SHIPPED | OUTPATIENT
Start: 2024-05-09

## 2024-05-09 NOTE — TELEPHONE ENCOUNTER
Novolin vials out of stock till end of summer, they do have Novolin 70/30 pens or Humulin vials. Any preference? Please send

## 2024-05-09 NOTE — TELEPHONE ENCOUNTER
We can do Humalog 75/25 pen with same doses as she is asher taking .  Please send me RX and I will send

## 2024-05-13 RX ORDER — HUMAN INSULIN 100 [IU]/ML
INJECTION, SUSPENSION SUBCUTANEOUS
Qty: 15 ADJUSTABLE DOSE PRE-FILLED PEN SYRINGE | Refills: 1 | Status: SHIPPED | OUTPATIENT
Start: 2024-05-13

## 2024-05-13 RX ORDER — HUMAN INSULIN 100 [IU]/ML
INJECTION, SUSPENSION SUBCUTANEOUS 2 TIMES DAILY
COMMUNITY
End: 2024-05-13 | Stop reason: SDUPTHER

## 2024-05-17 DIAGNOSIS — I10 BENIGN ESSENTIAL HYPERTENSION: ICD-10-CM

## 2024-05-19 RX ORDER — METOPROLOL SUCCINATE 50 MG/1
50 TABLET, EXTENDED RELEASE ORAL DAILY
Qty: 90 TABLET | Refills: 1 | Status: SHIPPED | OUTPATIENT
Start: 2024-05-19

## 2024-06-19 ENCOUNTER — TELEPHONE (OUTPATIENT)
Dept: PHARMACY | Facility: CLINIC | Age: 79
End: 2024-06-19

## 2024-06-19 NOTE — TELEPHONE ENCOUNTER
POPULATION HEALTH CLINICAL PHARMACY: ADHERENCE REVIEW  Identified care gap per [employer]: fills at [facility]: Statin adherence      [id number] - [place], [place] - [address] - P [phone] - F [phone]  [address]  [place] [address]  Phone: [phone] Fax: [phone]      Patient also appears to be prescribed: ACE/ARB, Diabetes, and Statin     Current Outpatient Medications   Medication Instructions    atorvastatin (LIPITOR) 40 mg, Oral, EVERY 24 HOURS    blood glucose test strips (ONETOUCH VERIO) strip Use to test blood sugar 4 times a day.    Continuous Blood Gluc  (FREESTYLE FRANNY 2 READER) ALEXIS 1 device    Continuous Glucose Sensor (FREESTYLE FRANNY 2 SENSOR) MISC Every 14 days    furosemide (LASIX) 20 mg, Oral, DAILY    Handicap Placard MISC Does not apply, Duration - lifetime<BR>Expiration - 5 years - 11/04/2027    hydrALAZINE (APRESOLINE) 25 mg, Oral, DAILY    insulin lispro protamine & lispro (HUMALOG MIX 75/25 KWIKPEN) (75-25) 100 UNIT per ML SUPN injection pen 26 units with breakfast and 13 before dinner    Insulin NPH Isophane & Regular (NOVOLIN 70/30 FLEXPEN) (70-30) 100 UNIT per ML injection pen 26 units with breakfast and 13 with dinner    Insulin Pen Needle (PEN NEEDLES) 32G X 4 MM MISC 2 times per day    Insulin Syringe-Needle U-100 (BD INSULIN SYRINGE U/F) 31G X 5/16\" 0.5 ML MISC USE AS DIRECTED TO TAKE INSULIN 2 TIMES A DAY    Lancets MISC Use to check BG 1-2x/day    lisinopril-hydroCHLOROthiazide (PRINZIDE;ZESTORETIC) 20-12.5 MG per tablet 1 tablet, Oral, 2 TIMES DAILY    metFORMIN (GLUCOPHAGE) 500 mg, Oral, 2 TIMES DAILY WITH MEALS    metoprolol succinate (TOPROL XL) 50 mg, Oral, DAILY    Multiple Vitamins-Minerals (PRESERVISION AREDS 2 PO) Oral, 2 TIMES DAILY    vitamin D (CHOLECALCIFEROL) 1,000 Units, Oral, DAILY         ASSESSMENT    DIABETES ADHERENCE  Insurance Records claims through 6/4/24  YTD PDC = EXCLUDED (on insulin):     Lab Results   Component Value Date

## 2024-06-28 DIAGNOSIS — N18.31 STAGE 3A CHRONIC KIDNEY DISEASE (HCC): ICD-10-CM

## 2024-06-28 DIAGNOSIS — E11.22 TYPE 2 DIABETES MELLITUS WITH STAGE 3A CHRONIC KIDNEY DISEASE, WITH LONG-TERM CURRENT USE OF INSULIN (HCC): ICD-10-CM

## 2024-06-28 DIAGNOSIS — R53.83 OTHER FATIGUE: ICD-10-CM

## 2024-06-28 DIAGNOSIS — Z79.899 LONG TERM CURRENT USE OF THERAPEUTIC DRUG: ICD-10-CM

## 2024-06-28 DIAGNOSIS — N18.31 TYPE 2 DIABETES MELLITUS WITH STAGE 3A CHRONIC KIDNEY DISEASE, WITH LONG-TERM CURRENT USE OF INSULIN (HCC): ICD-10-CM

## 2024-06-28 DIAGNOSIS — E55.9 VITAMIN D DEFICIENCY: ICD-10-CM

## 2024-06-28 DIAGNOSIS — Z79.4 TYPE 2 DIABETES MELLITUS WITH STAGE 3A CHRONIC KIDNEY DISEASE, WITH LONG-TERM CURRENT USE OF INSULIN (HCC): ICD-10-CM

## 2024-06-28 DIAGNOSIS — E78.2 MIXED HYPERLIPIDEMIA: ICD-10-CM

## 2024-06-28 LAB
ALBUMIN: 4 G/DL (ref 3.5–5.2)
ALP BLD-CCNC: 169 U/L (ref 35–104)
ALT SERPL-CCNC: 14 U/L (ref 0–32)
ANION GAP SERPL CALCULATED.3IONS-SCNC: 19 MMOL/L (ref 7–16)
AST SERPL-CCNC: 22 U/L (ref 0–31)
BASOPHILS ABSOLUTE: 0.06 K/UL (ref 0–0.2)
BASOPHILS RELATIVE PERCENT: 1 % (ref 0–2)
BILIRUB SERPL-MCNC: 0.3 MG/DL (ref 0–1.2)
BILIRUBIN, URINE: NEGATIVE
BUN BLDV-MCNC: 49 MG/DL (ref 6–23)
CALCIUM SERPL-MCNC: 10 MG/DL (ref 8.6–10.2)
CHLORIDE BLD-SCNC: 97 MMOL/L (ref 98–107)
CHOLESTEROL, FASTING: 187 MG/DL
CO2: 23 MMOL/L (ref 22–29)
COLOR, UA: YELLOW
CREAT SERPL-MCNC: 1.4 MG/DL (ref 0.5–1)
CREATININE URINE: 40.5 MG/DL (ref 29–226)
EOSINOPHILS ABSOLUTE: 0.43 K/UL (ref 0.05–0.5)
EOSINOPHILS RELATIVE PERCENT: 5 % (ref 0–6)
FOLATE: >20 NG/ML (ref 4.8–24.2)
GFR, ESTIMATED: 37 ML/MIN/1.73M2
GLUCOSE BLD-MCNC: 231 MG/DL (ref 74–99)
GLUCOSE URINE: NEGATIVE MG/DL
HBA1C MFR BLD: 8.5 % (ref 4–5.6)
HCT VFR BLD CALC: 38.9 % (ref 34–48)
HDLC SERPL-MCNC: 62 MG/DL
HEMOGLOBIN: 12.4 G/DL (ref 11.5–15.5)
IMMATURE GRANULOCYTES %: 0 % (ref 0–5)
IMMATURE GRANULOCYTES ABSOLUTE: 0.03 K/UL (ref 0–0.58)
KETONES, URINE: NEGATIVE MG/DL
LDL CHOLESTEROL: 101 MG/DL
LEUKOCYTE ESTERASE, URINE: ABNORMAL
LYMPHOCYTES ABSOLUTE: 2.13 K/UL (ref 1.5–4)
LYMPHOCYTES RELATIVE PERCENT: 26 % (ref 20–42)
MAGNESIUM: 2 MG/DL (ref 1.6–2.6)
MCH RBC QN AUTO: 27.9 PG (ref 26–35)
MCHC RBC AUTO-ENTMCNC: 31.9 G/DL (ref 32–34.5)
MCV RBC AUTO: 87.6 FL (ref 80–99.9)
MICROALBUMIN/CREAT 24H UR: <12 MG/L (ref 0–19)
MICROALBUMIN/CREAT UR-RTO: NORMAL MCG/MG CREAT (ref 0–30)
MONOCYTES ABSOLUTE: 0.49 K/UL (ref 0.1–0.95)
MONOCYTES RELATIVE PERCENT: 6 % (ref 2–12)
NEUTROPHILS ABSOLUTE: 4.96 K/UL (ref 1.8–7.3)
NEUTROPHILS RELATIVE PERCENT: 61 % (ref 43–80)
NITRITE, URINE: NEGATIVE
PDW BLD-RTO: 13.8 % (ref 11.5–15)
PH, URINE: 6 (ref 5–9)
PLATELET # BLD: 196 K/UL (ref 130–450)
PMV BLD AUTO: 11.6 FL (ref 7–12)
POTASSIUM SERPL-SCNC: 4.2 MMOL/L (ref 3.5–5)
PROTEIN UA: NEGATIVE MG/DL
RBC # BLD: 4.44 M/UL (ref 3.5–5.5)
RBC UA: NORMAL /HPF
SODIUM BLD-SCNC: 139 MMOL/L (ref 132–146)
SPECIFIC GRAVITY UA: <1.005 (ref 1–1.03)
TOTAL PROTEIN: 7.3 G/DL (ref 6.4–8.3)
TRIGLYCERIDE, FASTING: 121 MG/DL
TSH SERPL DL<=0.05 MIU/L-ACNC: 3.6 UIU/ML (ref 0.27–4.2)
TURBIDITY: CLEAR
URINE HGB: NEGATIVE
UROBILINOGEN, URINE: 0.2 EU/DL (ref 0–1)
VITAMIN B-12: 686 PG/ML (ref 211–946)
VITAMIN D 25-HYDROXY: 40.7 NG/ML (ref 30–100)
VLDLC SERPL CALC-MCNC: 24 MG/DL
WBC # BLD: 8.1 K/UL (ref 4.5–11.5)
WBC UA: NORMAL /HPF

## 2024-07-03 ENCOUNTER — OFFICE VISIT (OUTPATIENT)
Dept: PRIMARY CARE CLINIC | Age: 79
End: 2024-07-03

## 2024-07-03 VITALS
OXYGEN SATURATION: 99 % | HEIGHT: 65 IN | HEART RATE: 77 BPM | WEIGHT: 199 LBS | TEMPERATURE: 97.7 F | DIASTOLIC BLOOD PRESSURE: 70 MMHG | BODY MASS INDEX: 33.15 KG/M2 | SYSTOLIC BLOOD PRESSURE: 148 MMHG

## 2024-07-03 DIAGNOSIS — E78.2 MIXED HYPERLIPIDEMIA: ICD-10-CM

## 2024-07-03 DIAGNOSIS — N18.31 STAGE 3A CHRONIC KIDNEY DISEASE (HCC): ICD-10-CM

## 2024-07-03 DIAGNOSIS — R53.83 OTHER FATIGUE: ICD-10-CM

## 2024-07-03 DIAGNOSIS — R60.0 LOCALIZED EDEMA: ICD-10-CM

## 2024-07-03 DIAGNOSIS — I10 BENIGN ESSENTIAL HYPERTENSION: ICD-10-CM

## 2024-07-03 DIAGNOSIS — N18.31 TYPE 2 DIABETES MELLITUS WITH STAGE 3A CHRONIC KIDNEY DISEASE, WITH LONG-TERM CURRENT USE OF INSULIN (HCC): ICD-10-CM

## 2024-07-03 DIAGNOSIS — E55.9 VITAMIN D DEFICIENCY: ICD-10-CM

## 2024-07-03 DIAGNOSIS — E11.22 TYPE 2 DIABETES MELLITUS WITH STAGE 3A CHRONIC KIDNEY DISEASE, WITH LONG-TERM CURRENT USE OF INSULIN (HCC): ICD-10-CM

## 2024-07-03 DIAGNOSIS — Z79.4 TYPE 2 DIABETES MELLITUS WITH STAGE 3A CHRONIC KIDNEY DISEASE, WITH LONG-TERM CURRENT USE OF INSULIN (HCC): ICD-10-CM

## 2024-07-03 DIAGNOSIS — R29.898 WEAKNESS OF BOTH LOWER LIMBS: Primary | ICD-10-CM

## 2024-07-03 DIAGNOSIS — Z79.899 LONG TERM CURRENT USE OF THERAPEUTIC DRUG: ICD-10-CM

## 2024-07-03 RX ORDER — FUROSEMIDE 20 MG/1
20 TABLET ORAL DAILY
Qty: 90 TABLET | Refills: 1 | Status: SHIPPED | OUTPATIENT
Start: 2024-07-03

## 2024-07-03 RX ORDER — LISINOPRIL AND HYDROCHLOROTHIAZIDE 20; 12.5 MG/1; MG/1
1 TABLET ORAL 2 TIMES DAILY
Qty: 180 TABLET | Refills: 1 | Status: SHIPPED | OUTPATIENT
Start: 2024-07-03

## 2024-07-03 RX ORDER — ATORVASTATIN CALCIUM 40 MG/1
40 TABLET, FILM COATED ORAL EVERY 24 HOURS
Qty: 90 TABLET | Refills: 1 | Status: SHIPPED | OUTPATIENT
Start: 2024-07-03

## 2024-07-03 ASSESSMENT — ENCOUNTER SYMPTOMS
CHEST TIGHTNESS: 0
VOMITING: 0
SHORTNESS OF BREATH: 0
EYE PAIN: 0
SORE THROAT: 0
CONSTIPATION: 0
RHINORRHEA: 0
COUGH: 0
BLOOD IN STOOL: 0
NAUSEA: 0
ABDOMINAL PAIN: 0
DIARRHEA: 1

## 2024-07-03 NOTE — PROGRESS NOTES
lispro (HUMALOG MIX 75/25 KWIKPEN) (75-25) 100 UNIT per ML SUPN injection pen, 26 units with breakfast and 13 before dinner, Disp: 15 Adjustable Dose Pre-filled Pen Syringe, Rfl: 3    Insulin Pen Needle (PEN NEEDLES) 32G X 4 MM MISC, 2 times per day, Disp: 100 each, Rfl: 3    Insulin Syringe-Needle U-100 (BD INSULIN SYRINGE U/F) 31G X 5/16\" 0.5 ML MISC, USE AS DIRECTED TO TAKE INSULIN 2 TIMES A DAY, Disp: 200 each, Rfl: 3    Continuous Glucose Sensor (FREESTYLE FRANNY 2 SENSOR) MISC, Every 14 days, Disp: 2 each, Rfl: 5    metFORMIN (GLUCOPHAGE) 500 MG tablet, Take 1 tablet by mouth 2 times daily (with meals), Disp: 180 tablet, Rfl: 1    blood glucose test strips (ONETOUCH VERIO) strip, Use to test blood sugar 4 times a day., Disp: 200 each, Rfl: 5    hydrALAZINE (APRESOLINE) 25 MG tablet, Take 1 tablet by mouth daily, Disp: 90 tablet, Rfl: 1    Continuous Blood Gluc  (FREESTYLE FRANNY 2 READER) ALEXIS, 1 device, Disp: 1 each, Rfl: 0    Handicap Placard MISC, by Does not apply route Duration - lifetime Expiration - 5 years - 11/04/2027, Disp: 1 each, Rfl: 0    Lancets MISC, Use to check BG 1-2x/day, Disp: 200 each, Rfl: 3    Multiple Vitamins-Minerals (PRESERVISION AREDS 2 PO), Take by mouth 2 times daily, Disp: , Rfl:     vitamin D (CHOLECALCIFEROL) 1000 UNIT TABS tablet, Take 1 tablet by mouth daily, Disp: , Rfl:     Allergies   Allergen Reactions    Penicillins Hives and Other (See Comments)     EDEMA EXTREMITIES       Past Medical History:   Diagnosis Date    Bilateral nonexudative age-related macular degeneration 5/9/2019    HLD (hyperlipidemia)     HTN (hypertension)     Stage 2 chronic kidney disease 5/9/2019    T2DM (type 2 diabetes mellitus) (HCC)     Uterine cancer (HCC)     s/p surgery    Vitamin D deficiency 5/9/2019       Past Surgical History:   Procedure Laterality Date    HYSTERECTOMY, TOTAL ABDOMINAL (CERVIX REMOVED)      TONSILLECTOMY         Family History   Problem Relation Age of Onset

## 2024-07-14 ENCOUNTER — TELEPHONE (OUTPATIENT)
Dept: FAMILY MEDICINE CLINIC | Age: 79
End: 2024-07-14

## 2024-07-14 NOTE — TELEPHONE ENCOUNTER
Please let the patient know that ultrasound of the kidneys per radiology report suggested    No overt abnormality of the kidneys.  Right kidney showed mild atrophy.  Left kidney showed a nonspecific cyst    Bladder showed mild thickening of the walls    No overt explanation for kidney dysfunction    Recommending moving forward with nephrology evaluation    Thanks

## 2024-08-05 RX ORDER — HUMAN INSULIN 100 [IU]/ML
INJECTION, SUSPENSION SUBCUTANEOUS
Qty: 15 ADJUSTABLE DOSE PRE-FILLED PEN SYRINGE | Refills: 0 | Status: SHIPPED | OUTPATIENT
Start: 2024-08-05

## 2024-09-19 RX ORDER — HUMAN INSULIN 100 [IU]/ML
INJECTION, SUSPENSION SUBCUTANEOUS
Qty: 15 ADJUSTABLE DOSE PRE-FILLED PEN SYRINGE | Refills: 0 | Status: SHIPPED | OUTPATIENT
Start: 2024-09-19

## 2024-09-30 ENCOUNTER — TELEPHONE (OUTPATIENT)
Dept: PHARMACY | Facility: CLINIC | Age: 79
End: 2024-09-30

## 2024-09-30 NOTE — TELEPHONE ENCOUNTER
POPULATION HEALTH CLINICAL PHARMACY: ADHERENCE REVIEW  Identified care gap per [employer]: fills at [facility]: Statin adherence      [id number] - [place], [place] - [address] - P [phone] - F [phone]  [address]  [place] [address]  Phone: [phone] Fax: [phone]      Patient also appears to be prescribed: ACE/ARB, Diabetes, and Statin     Current Outpatient Medications   Medication Instructions    atorvastatin (LIPITOR) 40 mg, Oral, EVERY 24 HOURS    blood glucose test strips (ONETOUCH VERIO) strip Use to test blood sugar 4 times a day.    Continuous Blood Gluc  (FREESTYLE FRANNY 2 READER) ALEXIS 1 device    Continuous Glucose Sensor (FREESTYLE FRANNY 2 SENSOR) MISC Every 14 days    furosemide (LASIX) 20 mg, Oral, DAILY    Handicap Placard MISC Does not apply, Duration - lifetime<BR>Expiration - 5 years - 11/04/2027    hydrALAZINE (APRESOLINE) 25 mg, Oral, DAILY    insulin lispro protamine & lispro (HUMALOG MIX 75/25 KWIKPEN) (75-25) 100 UNIT per ML SUPN injection pen 26 units with breakfast and 13 before dinner    Insulin NPH Isophane & Regular (NOVOLIN 70/30 FLEXPEN) (70-30) 100 UNIT per ML injection pen 26 units with breakfast and 13 with dinner    Insulin Pen Needle (PEN NEEDLES) 32G X 4 MM MISC 2 times per day    Insulin Syringe-Needle U-100 (BD INSULIN SYRINGE U/F) 31G X 5/16\" 0.5 ML MISC USE AS DIRECTED TO TAKE INSULIN 2 TIMES A DAY    Lancets MISC Use to check BG 1-2x/day    lisinopril-hydroCHLOROthiazide (PRINZIDE;ZESTORETIC) 20-12.5 MG per tablet 1 tablet, Oral, 2 TIMES DAILY    metFORMIN (GLUCOPHAGE) 500 mg, Oral, 2 TIMES DAILY WITH MEALS    metoprolol succinate (TOPROL XL) 50 mg, Oral, DAILY    Multiple Vitamins-Minerals (PRESERVISION AREDS 2 PO) Oral, 2 TIMES DAILY    vitamin D (CHOLECALCIFEROL) 1,000 Units, Oral, DAILY         ASSESSMENT    DIABETES ADHERENCE  Insurance Records claims through 9/21/24  YTD PDC = EXCLUDED (on insulin)    Lab Results   Component Value Date

## 2024-10-03 SDOH — ECONOMIC STABILITY: TRANSPORTATION INSECURITY
IN THE PAST 12 MONTHS, HAS LACK OF TRANSPORTATION KEPT YOU FROM MEETINGS, WORK, OR FROM GETTING THINGS NEEDED FOR DAILY LIVING?: NO

## 2024-10-03 SDOH — ECONOMIC STABILITY: FOOD INSECURITY: WITHIN THE PAST 12 MONTHS, YOU WORRIED THAT YOUR FOOD WOULD RUN OUT BEFORE YOU GOT MONEY TO BUY MORE.: NEVER TRUE

## 2024-10-03 SDOH — ECONOMIC STABILITY: FOOD INSECURITY: WITHIN THE PAST 12 MONTHS, THE FOOD YOU BOUGHT JUST DIDN'T LAST AND YOU DIDN'T HAVE MONEY TO GET MORE.: NEVER TRUE

## 2024-10-03 SDOH — ECONOMIC STABILITY: INCOME INSECURITY: HOW HARD IS IT FOR YOU TO PAY FOR THE VERY BASICS LIKE FOOD, HOUSING, MEDICAL CARE, AND HEATING?: NOT VERY HARD

## 2024-10-03 SDOH — HEALTH STABILITY: PHYSICAL HEALTH: ON AVERAGE, HOW MANY DAYS PER WEEK DO YOU ENGAGE IN MODERATE TO STRENUOUS EXERCISE (LIKE A BRISK WALK)?: 3 DAYS

## 2024-10-03 SDOH — HEALTH STABILITY: PHYSICAL HEALTH: ON AVERAGE, HOW MANY MINUTES DO YOU ENGAGE IN EXERCISE AT THIS LEVEL?: 30 MIN

## 2024-10-03 ASSESSMENT — PATIENT HEALTH QUESTIONNAIRE - PHQ9
SUM OF ALL RESPONSES TO PHQ QUESTIONS 1-9: 0
SUM OF ALL RESPONSES TO PHQ9 QUESTIONS 1 & 2: 0
1. LITTLE INTEREST OR PLEASURE IN DOING THINGS: NOT AT ALL
2. FEELING DOWN, DEPRESSED OR HOPELESS: NOT AT ALL
SUM OF ALL RESPONSES TO PHQ QUESTIONS 1-9: 0

## 2024-10-03 ASSESSMENT — LIFESTYLE VARIABLES
HOW OFTEN DO YOU HAVE A DRINK CONTAINING ALCOHOL: NEVER
HOW OFTEN DO YOU HAVE A DRINK CONTAINING ALCOHOL: 1
HOW MANY STANDARD DRINKS CONTAINING ALCOHOL DO YOU HAVE ON A TYPICAL DAY: 0
HOW MANY STANDARD DRINKS CONTAINING ALCOHOL DO YOU HAVE ON A TYPICAL DAY: PATIENT DOES NOT DRINK
HOW OFTEN DO YOU HAVE SIX OR MORE DRINKS ON ONE OCCASION: 1

## 2024-10-04 ENCOUNTER — OFFICE VISIT (OUTPATIENT)
Dept: PRIMARY CARE CLINIC | Age: 79
End: 2024-10-04

## 2024-10-04 VITALS
HEART RATE: 83 BPM | TEMPERATURE: 98 F | BODY MASS INDEX: 32.15 KG/M2 | TEMPERATURE: 98 F | SYSTOLIC BLOOD PRESSURE: 164 MMHG | OXYGEN SATURATION: 99 % | SYSTOLIC BLOOD PRESSURE: 164 MMHG | WEIGHT: 193 LBS | DIASTOLIC BLOOD PRESSURE: 78 MMHG | HEIGHT: 65 IN | HEIGHT: 65 IN | OXYGEN SATURATION: 99 % | BODY MASS INDEX: 32.15 KG/M2 | WEIGHT: 193 LBS | HEART RATE: 83 BPM | DIASTOLIC BLOOD PRESSURE: 78 MMHG

## 2024-10-04 DIAGNOSIS — I10 BENIGN ESSENTIAL HYPERTENSION: ICD-10-CM

## 2024-10-04 DIAGNOSIS — N18.31 STAGE 3A CHRONIC KIDNEY DISEASE (HCC): ICD-10-CM

## 2024-10-04 DIAGNOSIS — R60.0 LOCALIZED EDEMA: ICD-10-CM

## 2024-10-04 DIAGNOSIS — E55.9 VITAMIN D DEFICIENCY: ICD-10-CM

## 2024-10-04 DIAGNOSIS — E11.22 TYPE 2 DIABETES MELLITUS WITH STAGE 3A CHRONIC KIDNEY DISEASE, WITH LONG-TERM CURRENT USE OF INSULIN (HCC): ICD-10-CM

## 2024-10-04 DIAGNOSIS — Z79.899 LONG TERM CURRENT USE OF THERAPEUTIC DRUG: ICD-10-CM

## 2024-10-04 DIAGNOSIS — Z79.4 TYPE 2 DIABETES MELLITUS WITH STAGE 3A CHRONIC KIDNEY DISEASE, WITH LONG-TERM CURRENT USE OF INSULIN (HCC): ICD-10-CM

## 2024-10-04 DIAGNOSIS — Z00.00 MEDICARE ANNUAL WELLNESS VISIT, SUBSEQUENT: Primary | ICD-10-CM

## 2024-10-04 DIAGNOSIS — R29.898 WEAKNESS OF BOTH LOWER LIMBS: Primary | ICD-10-CM

## 2024-10-04 DIAGNOSIS — R53.83 OTHER FATIGUE: ICD-10-CM

## 2024-10-04 DIAGNOSIS — N18.31 TYPE 2 DIABETES MELLITUS WITH STAGE 3A CHRONIC KIDNEY DISEASE, WITH LONG-TERM CURRENT USE OF INSULIN (HCC): ICD-10-CM

## 2024-10-04 DIAGNOSIS — E78.2 MIXED HYPERLIPIDEMIA: ICD-10-CM

## 2024-10-04 RX ORDER — DAPAGLIFLOZIN 10 MG/1
10 TABLET, FILM COATED ORAL EVERY MORNING
COMMUNITY
Start: 2024-09-06

## 2024-10-04 ASSESSMENT — ENCOUNTER SYMPTOMS
SORE THROAT: 0
SHORTNESS OF BREATH: 0
VOMITING: 0
DIARRHEA: 1
CONSTIPATION: 0
COUGH: 0
EYE PAIN: 0
ABDOMINAL PAIN: 0
CHEST TIGHTNESS: 0
RHINORRHEA: 0
NAUSEA: 0
BLOOD IN STOOL: 0

## 2024-10-04 NOTE — PROGRESS NOTES
Medicare Annual Wellness Visit    Any Falcon is here for Medicare AWV    Assessment & Plan   Medicare annual wellness visit, subsequent      Health Maintenance   - immunizations:   Influenza Vaccination - ([id number]), (2024)   Pneumonia Vaccination - (2018) - prevnar, (12/2019) - pneumococcal 23   Zoster/Shingles Vaccine  Tetanus Vaccination  covid (2/2/2021) #1, (2/23/2021) #2, (9/28/2021) #3  - pfizer, (11/3/2022) - bivalent pfizer. (10/2023) 23/24  RSV (12/2023)      - Screenings:   Bone Density Scan - (2012) - normal, declines further 10/4/2024  Pelvic/Pap Exam - (2014), (2015) - gyn  Mammogram - (2015) - gyn, declines further 10/4/2024    Colonoscopy - declines  FIT (8/2019) - negative, (4/2021) - order     Recommendations for Preventive Services Due: see orders and patient instructions/AVS.  Recommended screening schedule for the next 5-10 years is provided to the patient in written form: see Patient Instructions/AVS.     Return for Medicare Annual Wellness Visit in 1 year.       Subjective       Patient's complete Health Risk Assessment and screening values have been reviewed and are found in Flowsheets. The following problems were reviewed today and where indicated follow up appointments were made and/or referrals ordered.    Positive Risk Factor Screenings with Interventions:    Fall Risk:  Do you feel unsteady or are you worried about falling? : (!) yes  2 or more falls in past year?: no  Fall with injury in past year?: no     Interventions:    Reviewed medications, home hazards, visual acuity, and co-morbidities that can increase risk for falls  See AVS for additional education material               Abnormal BMI (obese):  Body mass index is 32.12 kg/m². (!) Abnormal  Interventions:  See AVS for additional education material            ADL's:   Patient reports needing help with:  Select all that apply: (!) Transportation  Interventions:  See AVS for additional education material    Advanced

## 2024-10-04 NOTE — PROGRESS NOTES
Moved in the Last Year: Not on file     Homeless in the Last Year: No       Vitals:    10/04/24 0907   BP: (!) 164/78   Pulse: 83   Temp: 98 °F (36.7 °C)   SpO2: 99%   Weight: 87.5 kg (193 lb)   Height: 1.651 m (5' 5\")       Exam:  Physical Exam  Vitals reviewed.   Constitutional:       Appearance: She is well-developed.   HENT:      Head: Normocephalic and atraumatic.      Right Ear: External ear normal.      Left Ear: External ear normal.   Eyes:      Pupils: Pupils are equal, round, and reactive to light.   Neck:      Thyroid: No thyromegaly.   Cardiovascular:      Rate and Rhythm: Normal rate and regular rhythm.      Heart sounds: Normal heart sounds. No murmur heard.  Pulmonary:      Effort: Pulmonary effort is normal.      Breath sounds: Normal breath sounds. No wheezing.   Abdominal:      General: Bowel sounds are normal. There is no distension.      Palpations: Abdomen is soft.      Tenderness: There is no abdominal tenderness. There is no guarding or rebound.   Musculoskeletal:         General: Normal range of motion.      Cervical back: Normal range of motion and neck supple.      Right lower leg: Edema present.      Left lower leg: Edema present.      Comments: Tenderness to thigh b/l   weakness to resistance of LE b/l    Lymphadenopathy:      Cervical: No cervical adenopathy.   Skin:     General: Skin is warm and dry.   Neurological:      Mental Status: She is alert and oriented to person, place, and time.      Cranial Nerves: No cranial nerve deficit.   Psychiatric:         Judgment: Judgment normal.         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

## 2024-10-04 NOTE — PATIENT INSTRUCTIONS
be sure to keep them away from flammable materials.    In case of fire, make sure you have a pre-established emergency exit plan.    Have a professional check your fireplace and other fuel-burning appliances yearly.    Helping Hands   Baby boomers entering the moyer years will continue to see the development of new products to help older adults live safely and independently in spite of age-related changes.  Making Life More Livable  , by Mayte Staley, lists over 1,000 products for \"living well in the mature years,\" such as bathing and mobility aids, household security devices, ergonomically designed knives and peelers, and faucet valves and knobs for temperature control. Medical supply stores and organizations are good sources of information about products that improve your quality of life and insure your safety.     Last Reviewed: November 2009 Kody Bright MD   Updated: 3/7/2011

## 2024-11-05 DIAGNOSIS — I10 BENIGN ESSENTIAL HYPERTENSION: ICD-10-CM

## 2024-11-05 DIAGNOSIS — E78.2 MIXED HYPERLIPIDEMIA: ICD-10-CM

## 2024-11-05 RX ORDER — ATORVASTATIN CALCIUM 40 MG/1
40 TABLET, FILM COATED ORAL EVERY 24 HOURS
Qty: 90 TABLET | Refills: 1 | Status: SHIPPED | OUTPATIENT
Start: 2024-11-05

## 2024-11-05 RX ORDER — METOPROLOL SUCCINATE 50 MG/1
50 TABLET, EXTENDED RELEASE ORAL DAILY
Qty: 90 TABLET | Refills: 1 | Status: SHIPPED | OUTPATIENT
Start: 2024-11-05

## 2024-12-02 RX ORDER — PEN NEEDLE, DIABETIC 30 GX3/16"
NEEDLE, DISPOSABLE MISCELLANEOUS
Qty: 200 EACH | Refills: 3 | Status: SHIPPED | OUTPATIENT
Start: 2024-12-02

## 2024-12-18 RX ORDER — HUMAN INSULIN 100 [IU]/ML
INJECTION, SUSPENSION SUBCUTANEOUS
Qty: 36 ML | Refills: 1 | Status: SHIPPED | OUTPATIENT
Start: 2024-12-18

## 2024-12-31 ENCOUNTER — OFFICE VISIT (OUTPATIENT)
Dept: ENDOCRINOLOGY | Age: 79
End: 2024-12-31
Payer: MEDICARE

## 2024-12-31 VITALS
HEART RATE: 61 BPM | SYSTOLIC BLOOD PRESSURE: 172 MMHG | HEIGHT: 65 IN | DIASTOLIC BLOOD PRESSURE: 92 MMHG | WEIGHT: 192 LBS | BODY MASS INDEX: 31.99 KG/M2 | OXYGEN SATURATION: 98 %

## 2024-12-31 DIAGNOSIS — I10 ESSENTIAL HYPERTENSION: ICD-10-CM

## 2024-12-31 DIAGNOSIS — E11.65 UNCONTROLLED TYPE 2 DIABETES MELLITUS WITH HYPERGLYCEMIA (HCC): Primary | ICD-10-CM

## 2024-12-31 DIAGNOSIS — E78.2 MIXED HYPERLIPIDEMIA: ICD-10-CM

## 2024-12-31 DIAGNOSIS — E55.9 VITAMIN D DEFICIENCY: ICD-10-CM

## 2024-12-31 LAB — HBA1C MFR BLD: 8 %

## 2024-12-31 PROCEDURE — 83036 HEMOGLOBIN GLYCOSYLATED A1C: CPT | Performed by: CLINICAL NURSE SPECIALIST

## 2024-12-31 PROCEDURE — 3080F DIAST BP >= 90 MM HG: CPT | Performed by: CLINICAL NURSE SPECIALIST

## 2024-12-31 PROCEDURE — 1159F MED LIST DOCD IN RCRD: CPT | Performed by: CLINICAL NURSE SPECIALIST

## 2024-12-31 PROCEDURE — 95251 CONT GLUC MNTR ANALYSIS I&R: CPT | Performed by: CLINICAL NURSE SPECIALIST

## 2024-12-31 PROCEDURE — 3077F SYST BP >= 140 MM HG: CPT | Performed by: CLINICAL NURSE SPECIALIST

## 2024-12-31 PROCEDURE — 1123F ACP DISCUSS/DSCN MKR DOCD: CPT | Performed by: CLINICAL NURSE SPECIALIST

## 2024-12-31 PROCEDURE — 3052F HG A1C>EQUAL 8.0%<EQUAL 9.0%: CPT | Performed by: CLINICAL NURSE SPECIALIST

## 2024-12-31 PROCEDURE — 99214 OFFICE O/P EST MOD 30 MIN: CPT | Performed by: CLINICAL NURSE SPECIALIST

## 2024-12-31 RX ORDER — PEN NEEDLE, DIABETIC 30 GX3/16"
NEEDLE, DISPOSABLE MISCELLANEOUS
Qty: 200 EACH | Refills: 3 | Status: SHIPPED | OUTPATIENT
Start: 2024-12-31

## 2024-12-31 RX ORDER — HUMAN INSULIN 100 [IU]/ML
INJECTION, SUSPENSION SUBCUTANEOUS
Qty: 5 ADJUSTABLE DOSE PRE-FILLED PEN SYRINGE | Refills: 5 | Status: SHIPPED | OUTPATIENT
Start: 2024-12-31

## 2024-12-31 NOTE — PROGRESS NOTES
tablet Take 1 tablet by mouth 2 times daily 180 tablet 1    Insulin Syringe-Needle U-100 (BD INSULIN SYRINGE U/F) 31G X 5/16\" 0.5 ML MISC USE AS DIRECTED TO TAKE INSULIN 2 TIMES A  each 3    Continuous Glucose Sensor (FREESTYLE FRANNY 2 SENSOR) MISC Every 14 days 2 each 5    blood glucose test strips (ONETOUCH VERIO) strip Use to test blood sugar 4 times a day. 200 each 5    Continuous Blood Gluc  (FREESTYLE FRANNY 2 READER) ALEXIS 1 device 1 each 0    Handicap Placard MISC by Does not apply route Duration - lifetime  Expiration - 5 years - 11/04/2027 1 each 0    Lancets MISC Use to check BG 1-2x/day 200 each 3    Multiple Vitamins-Minerals (PRESERVISION AREDS 2 PO) Take by mouth 2 times daily      vitamin D (CHOLECALCIFEROL) 1000 UNIT TABS tablet Take 1 tablet by mouth daily       No current facility-administered medications for this visit.       Review of Systems  Constitutional: No fever, no chills, no diaphoresis, no generalized weakness.  HEENT: No blurred vision, No sore throat, no ear pain, no hair loss  Neck: denied any neck swelling, difficulty swallowing,   Cardio-pulmonary: No CP, SOB or palpitation, No orthopnea or PND. No cough or wheezing.  GI: No N/V/D, no constipation, No abdominal pain, no melena or hematochezia   : Denied any dysuria, hematuria, flank pain, discharge, or incontinence.   Skin: denied any rash, ulcer, Hirsute, or hyperpigmentation.   MSK: denied any joint deformity, joint pain/swelling, muscle pain, or back pain.  Neuro: no numbness, no tingling, no weakness, _    OBJECTIVE    BP (!) 172/92   Pulse 61   Ht 1.651 m (5' 5\")   Wt 87.1 kg (192 lb)   SpO2 98%   BMI 31.95 kg/m²   BP Readings from Last 4 Encounters:   12/31/24 (!) 172/92   10/04/24 (!) 164/78   10/04/24 (!) 164/78   07/03/24 (!) 148/70     Wt Readings from Last 6 Encounters:   12/31/24 87.1 kg (192 lb)   10/04/24 87.5 kg (193 lb)   10/04/24 87.5 kg (193 lb)   07/03/24 90.3 kg (199 lb)   05/06/24 93 kg (205

## 2025-02-04 DIAGNOSIS — I10 BENIGN ESSENTIAL HYPERTENSION: ICD-10-CM

## 2025-02-04 RX ORDER — LISINOPRIL AND HYDROCHLOROTHIAZIDE 12.5; 2 MG/1; MG/1
1 TABLET ORAL 2 TIMES DAILY
Qty: 180 TABLET | Refills: 0 | Status: SHIPPED | OUTPATIENT
Start: 2025-02-04

## 2025-02-04 NOTE — TELEPHONE ENCOUNTER
Name of Medication(s) Requested:  Requested Prescriptions     Pending Prescriptions Disp Refills    lisinopril-hydroCHLOROthiazide (PRINZIDE;ZESTORETIC) 20-12.5 MG per tablet 180 tablet 0     Sig: Take 1 tablet by mouth 2 times daily       Medication is on current medication list Yes    Dosage and directions were verified? Yes    Quantity verified: 90 day supply     Pharmacy Verified?  Yes    Last Appointment:  10/4/2024    Future appts:  Future Appointments   Date Time Provider Department Center   4/30/2025 10:40 AM Don Rhoades APRN - [facility]        (If no appt send self scheduling link. .REFILLAPPT)  Scheduling request sent?     [x] Yes  [] No    Does patient need updated?  [] Yes  [x] No

## 2025-04-15 SDOH — ECONOMIC STABILITY: FOOD INSECURITY: WITHIN THE PAST 12 MONTHS, THE FOOD YOU BOUGHT JUST DIDN'T LAST AND YOU DIDN'T HAVE MONEY TO GET MORE.: NEVER TRUE

## 2025-04-15 SDOH — ECONOMIC STABILITY: FOOD INSECURITY: WITHIN THE PAST 12 MONTHS, YOU WORRIED THAT YOUR FOOD WOULD RUN OUT BEFORE YOU GOT MONEY TO BUY MORE.: NEVER TRUE

## 2025-04-15 SDOH — ECONOMIC STABILITY: INCOME INSECURITY: IN THE LAST 12 MONTHS, WAS THERE A TIME WHEN YOU WERE NOT ABLE TO PAY THE MORTGAGE OR RENT ON TIME?: NO

## 2025-04-15 SDOH — ECONOMIC STABILITY: TRANSPORTATION INSECURITY
IN THE PAST 12 MONTHS, HAS THE LACK OF TRANSPORTATION KEPT YOU FROM MEDICAL APPOINTMENTS OR FROM GETTING MEDICATIONS?: NO

## 2025-04-15 ASSESSMENT — PATIENT HEALTH QUESTIONNAIRE - PHQ9
SUM OF ALL RESPONSES TO PHQ QUESTIONS 1-9: 0
2. FEELING DOWN, DEPRESSED OR HOPELESS: NOT AT ALL
SUM OF ALL RESPONSES TO PHQ QUESTIONS 1-9: 0
1. LITTLE INTEREST OR PLEASURE IN DOING THINGS: NOT AT ALL
2. FEELING DOWN, DEPRESSED OR HOPELESS: NOT AT ALL
SUM OF ALL RESPONSES TO PHQ QUESTIONS 1-9: 0
SUM OF ALL RESPONSES TO PHQ QUESTIONS 1-9: 0
SUM OF ALL RESPONSES TO PHQ9 QUESTIONS 1 & 2: 0
1. LITTLE INTEREST OR PLEASURE IN DOING THINGS: NOT AT ALL

## 2025-04-18 ENCOUNTER — OFFICE VISIT (OUTPATIENT)
Dept: PRIMARY CARE CLINIC | Age: 80
End: 2025-04-18

## 2025-04-18 VITALS
DIASTOLIC BLOOD PRESSURE: 80 MMHG | OXYGEN SATURATION: 99 % | BODY MASS INDEX: 32.82 KG/M2 | HEIGHT: 65 IN | RESPIRATION RATE: 18 BRPM | HEART RATE: 75 BPM | SYSTOLIC BLOOD PRESSURE: 150 MMHG | WEIGHT: 197 LBS | TEMPERATURE: 97.7 F

## 2025-04-18 DIAGNOSIS — R29.898 WEAKNESS OF BOTH LOWER LIMBS: ICD-10-CM

## 2025-04-18 DIAGNOSIS — I10 BENIGN ESSENTIAL HYPERTENSION: ICD-10-CM

## 2025-04-18 DIAGNOSIS — H91.91 HEARING LOSS OF RIGHT EAR, UNSPECIFIED HEARING LOSS TYPE: Primary | ICD-10-CM

## 2025-04-18 DIAGNOSIS — R60.0 LOCALIZED EDEMA: ICD-10-CM

## 2025-04-18 DIAGNOSIS — E78.2 MIXED HYPERLIPIDEMIA: ICD-10-CM

## 2025-04-18 DIAGNOSIS — E55.9 VITAMIN D DEFICIENCY: ICD-10-CM

## 2025-04-18 DIAGNOSIS — Z79.4 TYPE 2 DIABETES MELLITUS WITH STAGE 3A CHRONIC KIDNEY DISEASE, WITH LONG-TERM CURRENT USE OF INSULIN (HCC): ICD-10-CM

## 2025-04-18 DIAGNOSIS — E11.22 TYPE 2 DIABETES MELLITUS WITH STAGE 3A CHRONIC KIDNEY DISEASE, WITH LONG-TERM CURRENT USE OF INSULIN (HCC): ICD-10-CM

## 2025-04-18 DIAGNOSIS — N18.31 TYPE 2 DIABETES MELLITUS WITH STAGE 3A CHRONIC KIDNEY DISEASE, WITH LONG-TERM CURRENT USE OF INSULIN (HCC): ICD-10-CM

## 2025-04-18 DIAGNOSIS — N18.31 STAGE 3A CHRONIC KIDNEY DISEASE (HCC): ICD-10-CM

## 2025-04-18 RX ORDER — METOPROLOL SUCCINATE 50 MG/1
50 TABLET, EXTENDED RELEASE ORAL DAILY
Qty: 90 TABLET | Refills: 1 | Status: SHIPPED | OUTPATIENT
Start: 2025-04-18

## 2025-04-18 RX ORDER — LISINOPRIL AND HYDROCHLOROTHIAZIDE 12.5; 2 MG/1; MG/1
1 TABLET ORAL 2 TIMES DAILY
Qty: 180 TABLET | Refills: 1 | Status: SHIPPED | OUTPATIENT
Start: 2025-04-18

## 2025-04-18 RX ORDER — FLUTICASONE PROPIONATE 50 MCG
1 SPRAY, SUSPENSION (ML) NASAL DAILY
Qty: 16 G | Refills: 2 | Status: SHIPPED | OUTPATIENT
Start: 2025-04-18

## 2025-04-18 RX ORDER — CIPROFLOXACIN AND DEXAMETHASONE 3; 1 MG/ML; MG/ML
4 SUSPENSION/ DROPS AURICULAR (OTIC) 2 TIMES DAILY
Qty: 7.5 ML | Refills: 0 | Status: SHIPPED | OUTPATIENT
Start: 2025-04-18 | End: 2025-04-25

## 2025-04-18 ASSESSMENT — ENCOUNTER SYMPTOMS
NAUSEA: 0
EYE PAIN: 0
SORE THROAT: 0
DIARRHEA: 1
COUGH: 0
CONSTIPATION: 0
BLOOD IN STOOL: 0
RHINORRHEA: 0
VOMITING: 0
SHORTNESS OF BREATH: 0
CHEST TIGHTNESS: 0
ABDOMINAL PAIN: 0

## 2025-04-18 NOTE — PROGRESS NOTES
[facility]   Internal Medicine     2025  Any Falcon : 1945 Sex: female  Age:79 y.o.    Chief Complaint   Patient presents with    Hypertension        HPI:   Patient presents to office for evaluation of the following medical concerns    - States had b/l cataract surgery (2025)     -  had right ear discomfort (2025). Resolved after a few days.  however has had decreased hearing as if has cotton stuffed in the ear.     -  has been having leg weakness b/l and aches in b/l legs since (3/2022) , now left more than right. States aches are worse at night. States having issues with getting up, no strength in legs. States some numbness.  has been taking ibuprofen multiple time a day. 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 Has seen neurology at [facility] feel diabetic amyotrophy, had EMG (2022). Started on gabapentin on 300mg once a day   - Last visit with neurology per reviewed report in care everywhere (2023) Diabetic amyotrophy did not tolerate duloxetine, gabapenitn daily unable to tolerate higher doses, In PT f/u 3 months.   - Some improved left leg weakness.   - States self stopped gabapentin due to swelling (2023).   -  has completed PT (3/2024). States walking with walker, better with steps. Stable and unchanged.   - No longer following with neurology     -  has LE edema. Echo () - EF 65%, mild concentric hypertrophy with stage I diastolic dysfunction, mild MR, trace TR.  has been taking lasix. States wearing compression stockings. Self stopped gabapentin. Off lasix 10mg most days. Still present but stable 2025    -  has been diagnosed with macular degeneration.  has seen retinal specialist and receiving injections. Possible Unsure if

## 2025-04-23 DIAGNOSIS — E11.9 TYPE 2 DIABETES MELLITUS WITHOUT COMPLICATION, WITH LONG-TERM CURRENT USE OF INSULIN (HCC): ICD-10-CM

## 2025-04-23 DIAGNOSIS — Z79.4 TYPE 2 DIABETES MELLITUS WITHOUT COMPLICATION, WITH LONG-TERM CURRENT USE OF INSULIN (HCC): ICD-10-CM

## 2025-04-23 RX ORDER — BLOOD SUGAR DIAGNOSTIC
STRIP MISCELLANEOUS
Qty: 400 EACH | Refills: 5 | Status: SHIPPED | OUTPATIENT
Start: 2025-04-23

## 2025-04-30 ENCOUNTER — OFFICE VISIT (OUTPATIENT)
Dept: ENDOCRINOLOGY | Age: 80
End: 2025-04-30
Payer: MEDICARE

## 2025-04-30 VITALS
BODY MASS INDEX: 31.82 KG/M2 | SYSTOLIC BLOOD PRESSURE: 183 MMHG | WEIGHT: 198 LBS | OXYGEN SATURATION: 100 % | DIASTOLIC BLOOD PRESSURE: 84 MMHG | HEIGHT: 66 IN | HEART RATE: 70 BPM

## 2025-04-30 DIAGNOSIS — Z79.4 TYPE 2 DIABETES MELLITUS WITHOUT COMPLICATION, WITH LONG-TERM CURRENT USE OF INSULIN (HCC): Primary | ICD-10-CM

## 2025-04-30 DIAGNOSIS — I10 ESSENTIAL HYPERTENSION: ICD-10-CM

## 2025-04-30 DIAGNOSIS — E11.9 TYPE 2 DIABETES MELLITUS WITHOUT COMPLICATION, WITH LONG-TERM CURRENT USE OF INSULIN (HCC): Primary | ICD-10-CM

## 2025-04-30 DIAGNOSIS — E78.2 MIXED HYPERLIPIDEMIA: ICD-10-CM

## 2025-04-30 DIAGNOSIS — E55.9 VITAMIN D DEFICIENCY: ICD-10-CM

## 2025-04-30 LAB — HBA1C MFR BLD: 7.7 %

## 2025-04-30 PROCEDURE — 99214 OFFICE O/P EST MOD 30 MIN: CPT | Performed by: CLINICAL NURSE SPECIALIST

## 2025-04-30 PROCEDURE — 3051F HG A1C>EQUAL 7.0%<8.0%: CPT | Performed by: CLINICAL NURSE SPECIALIST

## 2025-04-30 PROCEDURE — 1123F ACP DISCUSS/DSCN MKR DOCD: CPT | Performed by: CLINICAL NURSE SPECIALIST

## 2025-04-30 PROCEDURE — 95251 CONT GLUC MNTR ANALYSIS I&R: CPT | Performed by: CLINICAL NURSE SPECIALIST

## 2025-04-30 PROCEDURE — 3079F DIAST BP 80-89 MM HG: CPT | Performed by: CLINICAL NURSE SPECIALIST

## 2025-04-30 PROCEDURE — 83036 HEMOGLOBIN GLYCOSYLATED A1C: CPT | Performed by: CLINICAL NURSE SPECIALIST

## 2025-04-30 PROCEDURE — 3077F SYST BP >= 140 MM HG: CPT | Performed by: CLINICAL NURSE SPECIALIST

## 2025-04-30 PROCEDURE — 1159F MED LIST DOCD IN RCRD: CPT | Performed by: CLINICAL NURSE SPECIALIST

## 2025-04-30 RX ORDER — HUMAN INSULIN 100 [IU]/ML
INJECTION, SUSPENSION SUBCUTANEOUS
Qty: 5 ADJUSTABLE DOSE PRE-FILLED PEN SYRINGE | Refills: 5 | Status: SHIPPED | OUTPATIENT
Start: 2025-04-30

## 2025-04-30 RX ORDER — BLOOD-GLUCOSE SENSOR
EACH MISCELLANEOUS
Qty: 2 EACH | Refills: 5 | Status: SHIPPED | OUTPATIENT
Start: 2025-04-30

## 2025-04-30 RX ORDER — BLOOD-GLUCOSE SENSOR
EACH MISCELLANEOUS
COMMUNITY

## 2025-04-30 RX ORDER — PEN NEEDLE, DIABETIC 30 GX3/16"
NEEDLE, DISPOSABLE MISCELLANEOUS
Qty: 200 EACH | Refills: 3 | Status: SHIPPED | OUTPATIENT
Start: 2025-04-30

## 2025-04-30 NOTE — PROGRESS NOTES
[facility] [employer]  [facility] Department of Endocrinology Diabetes and Metabolism   [address], [place], [place], [address]  Phone: [phone]  Fax: [phone]    Date of Service: 4/30/2025    Primary Care Physician: Brendon Villalba MD  Referring physician: No ref. provider found  Provider: MARKEL Jasmine - CNS     Reason for the visit:  Type 2 DM       History of Present Illness:  The history is provided by the patient. No  was used. Accuracy of the patient data is excellent.  Ayn Falcon is a very pleasant 79 y.o. female seen today for diabetes management     Any Falcon was diagnosed with diabetes in 1995 and currently on , Novolin 70/30 21 units in AM and 11 units in PM,   Farxiga 10 mg daily (Started by Nephrology)    She holds her 70/30  If BS is < 100.   Nephrology stopped metformin           The patient has been checking blood sugar 4 times per day   Uses ric   Ambulatory continuous glucose monitoring of interstitial tissue fluid via a subcutaneous sensor for a minimum of 72 hours; analysis, interpretation and report  Average sensor glucose 183  Time in range 54%  Hyperglycemia 46%  Hypoglycemia 0%    Recently diagnosed with diabetic amyotrophy  Following with [facility] neurology       Most recent A1c results summarized below  Lab Results   Component Value Date/Time    LABA1C 7.7 04/30/2025 10:48 AM    LABA1C 8.0 12/31/2024 10:06 AM    LABA1C 8.5 06/28/2024 08:29 AM       Patient has had infrequent  hypoglycemic episodes     The patient has been mindful of what has been eating and is following a diabetes diet    I reviewed current medications and the patient has no issues with diabetes medications  Any Falcon is up to date with eye exam and denied any history of diabetic retinopathy   Last eye exam was 2024 , no h/o diabetic retinopathy  The patient performs own feet care  Microvascular complications:  No Retinopathy, stage 3 CKD  or Neuropathy

## 2025-05-28 LAB
CHOLESTEROL, TOTAL: 172 MG/DL
CHOLESTEROL/HDL RATIO: NORMAL
CREATININE URINE: 32.1 MG/DL
ESTIMATED AVERAGE GLUCOSE: NORMAL
HBA1C MFR BLD: 8.5 %
HDLC SERPL-MCNC: 63 MG/DL (ref 35–70)
LDL CHOLESTEROL: 91
MICROALBUMIN/CREAT 24H UR: 5 MG/DL
MICROALBUMIN/CREAT UR-RTO: 0.16 MG/G
NONHDLC SERPL-MCNC: NORMAL MG/DL
TRIGL SERPL-MCNC: 102 MG/DL
VLDLC SERPL CALC-MCNC: 16 MG/DL

## 2025-07-15 SDOH — HEALTH STABILITY: PHYSICAL HEALTH: ON AVERAGE, HOW MANY DAYS PER WEEK DO YOU ENGAGE IN MODERATE TO STRENUOUS EXERCISE (LIKE A BRISK WALK)?: 3 DAYS

## 2025-07-15 SDOH — HEALTH STABILITY: PHYSICAL HEALTH: ON AVERAGE, HOW MANY MINUTES DO YOU ENGAGE IN EXERCISE AT THIS LEVEL?: 30 MIN

## 2025-07-15 ASSESSMENT — LIFESTYLE VARIABLES
HOW OFTEN DO YOU HAVE SIX OR MORE DRINKS ON ONE OCCASION: 1
HOW OFTEN DO YOU HAVE A DRINK CONTAINING ALCOHOL: 1
HOW MANY STANDARD DRINKS CONTAINING ALCOHOL DO YOU HAVE ON A TYPICAL DAY: 0
HOW OFTEN DO YOU HAVE A DRINK CONTAINING ALCOHOL: NEVER
HOW MANY STANDARD DRINKS CONTAINING ALCOHOL DO YOU HAVE ON A TYPICAL DAY: PATIENT DOES NOT DRINK

## 2025-07-15 ASSESSMENT — PATIENT HEALTH QUESTIONNAIRE - PHQ9
SUM OF ALL RESPONSES TO PHQ QUESTIONS 1-9: 0
2. FEELING DOWN, DEPRESSED OR HOPELESS: NOT AT ALL
SUM OF ALL RESPONSES TO PHQ QUESTIONS 1-9: 0
SUM OF ALL RESPONSES TO PHQ QUESTIONS 1-9: 0
1. LITTLE INTEREST OR PLEASURE IN DOING THINGS: NOT AT ALL
SUM OF ALL RESPONSES TO PHQ QUESTIONS 1-9: 0

## 2025-07-18 ENCOUNTER — OFFICE VISIT (OUTPATIENT)
Dept: PRIMARY CARE CLINIC | Age: 80
End: 2025-07-18

## 2025-07-18 VITALS
HEART RATE: 89 BPM | WEIGHT: 199 LBS | HEIGHT: 66 IN | RESPIRATION RATE: 16 BRPM | BODY MASS INDEX: 32.12 KG/M2 | SYSTOLIC BLOOD PRESSURE: 140 MMHG | DIASTOLIC BLOOD PRESSURE: 88 MMHG | OXYGEN SATURATION: 98 % | HEART RATE: 89 BPM | OXYGEN SATURATION: 98 % | TEMPERATURE: 97.9 F | DIASTOLIC BLOOD PRESSURE: 88 MMHG | RESPIRATION RATE: 16 BRPM | TEMPERATURE: 97.9 F | SYSTOLIC BLOOD PRESSURE: 140 MMHG | BODY MASS INDEX: 31.98 KG/M2 | WEIGHT: 199 LBS

## 2025-07-18 DIAGNOSIS — N18.31 TYPE 2 DIABETES MELLITUS WITH STAGE 3A CHRONIC KIDNEY DISEASE, WITH LONG-TERM CURRENT USE OF INSULIN (HCC): Primary | ICD-10-CM

## 2025-07-18 DIAGNOSIS — Z79.4 TYPE 2 DIABETES MELLITUS WITH STAGE 3A CHRONIC KIDNEY DISEASE, WITH LONG-TERM CURRENT USE OF INSULIN (HCC): Primary | ICD-10-CM

## 2025-07-18 DIAGNOSIS — I10 BENIGN ESSENTIAL HYPERTENSION: ICD-10-CM

## 2025-07-18 DIAGNOSIS — R53.83 OTHER FATIGUE: ICD-10-CM

## 2025-07-18 DIAGNOSIS — N18.31 STAGE 3A CHRONIC KIDNEY DISEASE (HCC): ICD-10-CM

## 2025-07-18 DIAGNOSIS — Z79.899 LONG TERM CURRENT USE OF THERAPEUTIC DRUG: ICD-10-CM

## 2025-07-18 DIAGNOSIS — R29.898 WEAKNESS OF BOTH LOWER LIMBS: ICD-10-CM

## 2025-07-18 DIAGNOSIS — E55.9 VITAMIN D DEFICIENCY: ICD-10-CM

## 2025-07-18 DIAGNOSIS — Z00.00 MEDICARE ANNUAL WELLNESS VISIT, SUBSEQUENT: Primary | ICD-10-CM

## 2025-07-18 DIAGNOSIS — R60.0 LOCALIZED EDEMA: ICD-10-CM

## 2025-07-18 DIAGNOSIS — E11.22 TYPE 2 DIABETES MELLITUS WITH STAGE 3A CHRONIC KIDNEY DISEASE, WITH LONG-TERM CURRENT USE OF INSULIN (HCC): Primary | ICD-10-CM

## 2025-07-18 DIAGNOSIS — E78.2 MIXED HYPERLIPIDEMIA: ICD-10-CM

## 2025-07-18 RX ORDER — METOPROLOL SUCCINATE 50 MG/1
50 TABLET, EXTENDED RELEASE ORAL DAILY
Qty: 90 TABLET | Refills: 1 | Status: SHIPPED | OUTPATIENT
Start: 2025-07-18

## 2025-07-18 RX ORDER — LISINOPRIL AND HYDROCHLOROTHIAZIDE 12.5; 2 MG/1; MG/1
1 TABLET ORAL 2 TIMES DAILY
Qty: 180 TABLET | Refills: 1 | Status: SHIPPED | OUTPATIENT
Start: 2025-07-18

## 2025-07-18 SDOH — ECONOMIC STABILITY: FOOD INSECURITY: WITHIN THE PAST 12 MONTHS, YOU WORRIED THAT YOUR FOOD WOULD RUN OUT BEFORE YOU GOT MONEY TO BUY MORE.: NEVER TRUE

## 2025-07-18 SDOH — ECONOMIC STABILITY: FOOD INSECURITY: WITHIN THE PAST 12 MONTHS, THE FOOD YOU BOUGHT JUST DIDN'T LAST AND YOU DIDN'T HAVE MONEY TO GET MORE.: NEVER TRUE

## 2025-07-18 ASSESSMENT — ENCOUNTER SYMPTOMS
SHORTNESS OF BREATH: 0
COUGH: 0
ABDOMINAL PAIN: 0
NAUSEA: 0
VOMITING: 0
DIARRHEA: 1
CONSTIPATION: 0
SORE THROAT: 0
CHEST TIGHTNESS: 0
BLOOD IN STOOL: 0
EYE PAIN: 0
RHINORRHEA: 0

## 2025-07-18 NOTE — PROGRESS NOTES
Medicare Annual Wellness Visit    Any Falcon is here for Medicare AWV    Assessment & Plan   Medicare annual wellness visit, subsequent       Health Maintenance   - immunizations:   Influenza Vaccination - ([id number]), (2024)   Pneumonia Vaccination - (2018) - prevnar, (12/2019) - pneumococcal 23   Zoster/Shingles Vaccine  Tetanus Vaccination  covid (2/2/2021) #1, (2/23/2021) #2, (9/28/2021) #3  - pfizer, (11/3/2022) - bivalent pfizer. (10/2023) 23/24, (2024)   RSV (12/2023)      - Screenings:   Bone Density Scan - (2012) - normal, declines further 7/18/2025  Pelvic/Pap Exam - (2014), (2015) - gyn  Mammogram - (2015) - gyn, declines further 7/18/2025    Colonoscopy - declines  FIT (8/2019) - negative, (4/2021) - order     Return for Medicare Annual Wellness Visit in 1 year.       Subjective       Patient's complete Health Risk Assessment and screening values have been reviewed and are found in Flowsheets. The following problems were reviewed today and where indicated follow up appointments were made and/or referrals ordered.    Positive Risk Factor Screenings with Interventions:    Fall Risk:  Do you feel unsteady or are you worried about falling? : (!) yes  2 or more falls in past year?: no  Fall with injury in past year?: no  Interventions:    Reviewed medications, home hazards, visual acuity, and co-morbidities that can increase risk for falls  Recommended Assisted Device  See AVS for additional education material               Abnormal BMI (obese):  Body mass index is 32.12 kg/m². (!) Abnormal  Interventions:  See AVS for additional education material             ADL's:   Patient reports needing help with:  Select all that apply: (!) Laundry, Shopping, Transportation  Interventions:  See AVS for additional education material    Advanced Directives:  Do you have a Living Will?: (!) No    Intervention:  has NO advanced directive - information provided                     Objective   Vitals:    07/18/25 0827

## 2025-07-18 NOTE — PATIENT INSTRUCTIONS
has a copy of these on file, and give a copy to a family member or close friend.  Consider a do-not-resuscitate order (DNR). This order asks that no extra treatments be done if your heart stops or you stop breathing. Extra treatments may include cardiopulmonary resuscitation (CPR), electrical shock to restart your heart, or a machine to breathe for you. If you decide to have a DNR order, ask your doctor to explain and write it. Place the order in your home where everyone can easily see it.  Consider a living will. A living will explains your wishes about life support and other treatments at the end of your life if you become unable to speak for yourself. Living murphy tell doctors to use or not use treatments that would keep you alive. You must have one or two witnesses or a notary present when you sign this form. A living will may be called something else in your state.  Consider a medical power of . This form allows you to name a person to make decisions about your care if you are not able to. Most people ask a close friend or family member. Talk to this person about the kinds of treatments you want and those that you do not want. Make sure this person understands your wishes. A medical power of  may be called something else in your state.  These legal papers are simple to change. Tell your doctor what you want to change, and ask him or her to make a note in your medical file. Give your family updated copies of the papers.  Where can you learn more?  Go to https://www.[employer].net/patientEd and enter P184 to learn more about \"Advance Care Planning: Care Instructions.\"  Current as of: October 24, 2024  Content Version: 14.5  © 6857-2993 [employer].   Care instructions adapted under license by [employer]. If you have questions about a medical condition or this instruction, always ask your healthcare professional. [employer] disclaims any warranty or liability for your use

## 2025-07-18 NOTE — PROGRESS NOTES
[facility]   Internal Medicine     2025  Any Falcon : 1945 Sex: female  Age:79 y.o.    Chief Complaint   Patient presents with    3 Month Follow-Up    Discuss Labs        HPI:   Patient presents to office for evaluation of the following medical concerns    - States had b/l cataract surgery (2025)     -  has been having leg weakness b/l and aches in b/l legs since (3/2022) , now left more than right. States aches are worse at night. States having issues with getting up, no strength in legs. States some numbness. States has been taking ibuprofen multiple time a day. 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 Has seen neurology at [facility] feel diabetic amyotrophy, had EMG (2022). Started on gabapentin on 300mg once a day   - Last visit with neurology per reviewed report in care everywhere (2023) Diabetic amyotrophy did not tolerate duloxetine, gabapenitn daily unable to tolerate higher doses, In PT f/u 3 months.   - States self stopped gabapentin due to swelling (2023).   - States has completed PT (3/2024). States walking with walker, better with steps. Stable and unchanged.   - No longer following with neurology   - stable symptoms 2025    - States has LE edema.   - Echo () - EF 65%, mild concentric hypertrophy with stage I diastolic dysfunction, mild MR, trace TR. States has been taking lasix.   - States wearing compression stockings.   - Self stopped gabapentin.   - Off lasix 10mg   - Still present but stable 2025    - States has been diagnosed with macular degeneration. States has seen retinal specialist and receiving injections. Possible Unsure if Retinopathy.   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

## 2025-08-20 ENCOUNTER — OFFICE VISIT (OUTPATIENT)
Dept: ENDOCRINOLOGY | Age: 80
End: 2025-08-20
Payer: MEDICARE

## 2025-08-20 VITALS
HEIGHT: 66 IN | WEIGHT: 197 LBS | SYSTOLIC BLOOD PRESSURE: 136 MMHG | HEART RATE: 84 BPM | DIASTOLIC BLOOD PRESSURE: 75 MMHG | BODY MASS INDEX: 31.66 KG/M2 | OXYGEN SATURATION: 96 % | RESPIRATION RATE: 16 BRPM

## 2025-08-20 DIAGNOSIS — Z79.4 TYPE 2 DIABETES MELLITUS WITHOUT COMPLICATION, WITH LONG-TERM CURRENT USE OF INSULIN (HCC): Primary | ICD-10-CM

## 2025-08-20 DIAGNOSIS — E78.2 MIXED HYPERLIPIDEMIA: ICD-10-CM

## 2025-08-20 DIAGNOSIS — E11.9 TYPE 2 DIABETES MELLITUS WITHOUT COMPLICATION, WITH LONG-TERM CURRENT USE OF INSULIN (HCC): Primary | ICD-10-CM

## 2025-08-20 DIAGNOSIS — N18.32 DIABETES MELLITUS DUE TO UNDERLYING CONDITION WITH STAGE 3B CHRONIC KIDNEY DISEASE, WITH LONG-TERM CURRENT USE OF INSULIN (HCC): ICD-10-CM

## 2025-08-20 DIAGNOSIS — Z79.4 DIABETES MELLITUS DUE TO UNDERLYING CONDITION WITH STAGE 3B CHRONIC KIDNEY DISEASE, WITH LONG-TERM CURRENT USE OF INSULIN (HCC): ICD-10-CM

## 2025-08-20 DIAGNOSIS — E55.9 VITAMIN D DEFICIENCY: ICD-10-CM

## 2025-08-20 DIAGNOSIS — E08.22 DIABETES MELLITUS DUE TO UNDERLYING CONDITION WITH STAGE 3B CHRONIC KIDNEY DISEASE, WITH LONG-TERM CURRENT USE OF INSULIN (HCC): ICD-10-CM

## 2025-08-20 LAB — HBA1C MFR BLD: 8.4 %

## 2025-08-20 PROCEDURE — 3075F SYST BP GE 130 - 139MM HG: CPT | Performed by: FAMILY MEDICINE

## 2025-08-20 PROCEDURE — 3052F HG A1C>EQUAL 8.0%<EQUAL 9.0%: CPT | Performed by: FAMILY MEDICINE

## 2025-08-20 PROCEDURE — 3078F DIAST BP <80 MM HG: CPT | Performed by: FAMILY MEDICINE

## 2025-08-20 PROCEDURE — 1123F ACP DISCUSS/DSCN MKR DOCD: CPT | Performed by: FAMILY MEDICINE

## 2025-08-20 PROCEDURE — 1159F MED LIST DOCD IN RCRD: CPT | Performed by: FAMILY MEDICINE

## 2025-08-20 PROCEDURE — 95251 CONT GLUC MNTR ANALYSIS I&R: CPT | Performed by: FAMILY MEDICINE

## 2025-08-20 PROCEDURE — 99214 OFFICE O/P EST MOD 30 MIN: CPT | Performed by: FAMILY MEDICINE

## 2025-08-20 PROCEDURE — 83036 HEMOGLOBIN GLYCOSYLATED A1C: CPT | Performed by: FAMILY MEDICINE

## 2025-08-20 RX ORDER — BLOOD-GLUCOSE SENSOR
EACH MISCELLANEOUS
Qty: 6 EACH | Refills: 3 | Status: SHIPPED | OUTPATIENT
Start: 2025-08-20

## 2025-08-20 RX ORDER — DAPAGLIFLOZIN 10 MG/1
10 TABLET, FILM COATED ORAL EVERY MORNING
Qty: 90 TABLET | Refills: 3 | Status: SHIPPED | OUTPATIENT
Start: 2025-08-20